# Patient Record
Sex: FEMALE | Race: ASIAN | NOT HISPANIC OR LATINO | Employment: FULL TIME
[De-identification: names, ages, dates, MRNs, and addresses within clinical notes are randomized per-mention and may not be internally consistent; named-entity substitution may affect disease eponyms.]

---

## 2017-09-25 ENCOUNTER — HOSPITAL ENCOUNTER (OUTPATIENT)
Dept: ADMINISTRATIVE | Age: 27
Discharge: HOME | End: 2017-09-25

## 2017-09-28 ENCOUNTER — HOSPITAL ENCOUNTER (OUTPATIENT)
Dept: OBGYN | Facility: HOSPITAL | Age: 27
Discharge: HOME HEALTH | End: 2017-09-28
Attending: OBSTETRICS & GYNECOLOGY | Admitting: OBSTETRICS & GYNECOLOGY

## 2017-11-07 ENCOUNTER — RECORDS - HEALTHEAST (OUTPATIENT)
Dept: ADMINISTRATIVE | Facility: OTHER | Age: 27
End: 2017-11-07

## 2017-11-07 ENCOUNTER — ANESTHESIA - HEALTHEAST (OUTPATIENT)
Dept: OBGYN | Facility: HOSPITAL | Age: 27
End: 2017-11-07

## 2017-11-07 ENCOUNTER — SURGERY - HEALTHEAST (OUTPATIENT)
Dept: OBGYN | Facility: HOSPITAL | Age: 27
End: 2017-11-07

## 2017-11-07 ASSESSMENT — MIFFLIN-ST. JEOR: SCORE: 1276.54

## 2017-11-10 ENCOUNTER — HOME CARE/HOSPICE - HEALTHEAST (OUTPATIENT)
Dept: HOME HEALTH SERVICES | Facility: HOME HEALTH | Age: 27
End: 2017-11-10

## 2017-11-12 ENCOUNTER — HOME CARE/HOSPICE - HEALTHEAST (OUTPATIENT)
Dept: HOME HEALTH SERVICES | Facility: HOME HEALTH | Age: 27
End: 2017-11-12

## 2018-11-08 ENCOUNTER — OFFICE VISIT - HEALTHEAST (OUTPATIENT)
Dept: FAMILY MEDICINE | Facility: CLINIC | Age: 28
End: 2018-11-08

## 2018-11-08 ENCOUNTER — COMMUNICATION - HEALTHEAST (OUTPATIENT)
Dept: SCHEDULING | Facility: CLINIC | Age: 28
End: 2018-11-08

## 2018-11-08 ENCOUNTER — HOSPITAL ENCOUNTER (OUTPATIENT)
Dept: ULTRASOUND IMAGING | Facility: HOSPITAL | Age: 28
Discharge: HOME | End: 2018-11-08
Attending: FAMILY MEDICINE

## 2018-11-08 DIAGNOSIS — O20.9 BLEEDING IN EARLY PREGNANCY: ICD-10-CM

## 2018-11-08 DIAGNOSIS — Z32.00 POSSIBLE PREGNANCY: ICD-10-CM

## 2018-11-08 LAB
ERYTHROCYTE [DISTWIDTH] IN BLOOD BY AUTOMATED COUNT: 13 % (ref 11–14.5)
HCG SERPL-ACNC: 8493 MLU/ML (ref 0–4)
HCG UR QL: POSITIVE
HCT VFR BLD AUTO: 41.6 % (ref 35–47)
HGB BLD-MCNC: 13.3 G/DL (ref 12–16)
INR PPP: 1.02 (ref 0.9–1.1)
MCH RBC QN AUTO: 26 PG (ref 27–34)
MCHC RBC AUTO-ENTMCNC: 32 G/DL (ref 32–36)
MCV RBC AUTO: 81 FL (ref 80–100)
PLATELET # BLD AUTO: 227 THOU/UL (ref 140–440)
PMV BLD AUTO: 9.2 FL (ref 7–10)
RBC # BLD AUTO: 5.13 MILL/UL (ref 3.8–5.4)
WBC: 4.6 THOU/UL (ref 4–11)

## 2018-11-09 ENCOUNTER — COMMUNICATION - HEALTHEAST (OUTPATIENT)
Dept: FAMILY MEDICINE | Facility: CLINIC | Age: 28
End: 2018-11-09

## 2019-09-24 ENCOUNTER — TELEPHONE (OUTPATIENT)
Dept: FAMILY MEDICINE | Facility: CLINIC | Age: 29
End: 2019-09-24

## 2019-09-24 NOTE — TELEPHONE ENCOUNTER
[facility] phone call message- general phone call:    Reason for call: Patient states she is interested in scheduling an appointment for ob. She states she had her pregnancy confirmed at [facility] and they confirmed pregnancy via ultrasound. She states her expected due date is 03/11/2020. Notified patient, a nurse will return her call. Please call and advise.    Return call needed: Yes    OK to leave a message on voice mail? Yes    Primary language: English      needed? No    Call taken on September 24, 2019 at 8:32 AM by Delmy Landers

## 2019-09-25 NOTE — TELEPHONE ENCOUNTER
OB intake completed over the phone with patient today, 2019. Yo is a 28 yo, Hmong, English speaking,  with history of one miscarriage 2018 at about 10 weeks gestation. Uneventful, full term,  previous pregnancies- first pregnancy was ordoñez, second pregnancy- twin gestation. Ordoñez pregnancy was post dates, induced, NVSD. Second pregnancy delivered via  at 38 weeks gestation. Yo would like to attempt vaginal birth if able after TOLAC consultation with OB/Gyn for this pregnancy.      Delayed prenatal care due to inconvenience,lack of time, Yo works second shift, full time. Her , Bhavani Nguyen, 30 yo, Hmong male (culturally ) is a full time college student, limited . What prompted her to contact [facility] to establish prenatal care was a follow up call made by [facility] on [address]. Yo was seen at [facility] 2 months ago, had pregnancy confirmation and a basic ultrasound done. LMP reported as 2019. JUDY based on early ultrasound is 3/11/2020 which puts her at 16 weeks today. Has noticed slight quickening sensation.     Yo is Hepatitis B positive, per Yo this has been known since she was a child, born in [place]. Has not seen or established with GI/Hepatology regarding this. Brief discussion regarding the expectation of OB/Gyn and GI consultation during course of pregnancy. No known or identified family history of genetic disorders. No concerns voiced during intake. With a friend's recommendation, would like to request to see Dr Ojeda for prenatal care. Discussed with Dr Ojeda, who will be available to follow patient. NOB is scheduled for 2019. Mick RN    Average Risk Category  No significant risk factors: No    At Risk Category (up to 3)  Teen pregnancy: No  Poor social situation: No  Domestic abuse: No  Financial difficulties: No  Smoker: No  H/O  deliver: No  H/O drug abuse: No  Non-English speaking: No  Advanced  maternal age: No  GDM risks: No  Previous C/S: Yes  H/O PIH: No  H/O STIs: No  H/O mental health concerns: No  Onset care > 20 weeks: No  Other: NONE    High Risk Category (4 or more At Risk or)  Diabetes/GDM: No  Multiple gestation: No  Chronic hypertension: No  Significant hx of asthma: No  Fetal demise > 20 weeks: No  Positive tox screen: No  Current mental health treatment: No  Other: NONE    Risk: Average Risk   Date determined: 9/25/2019  Mick HERNANDEZ

## 2019-09-29 NOTE — PROGRESS NOTES
First Obstetric Visit       HPI       29 year old  (twins 2nd pregnancy delivered via c/s) with history of chronic hepatitis B at 16w5d via LMP (consistent with 11 wk US) with JUDY of 3/11/2020 presenting for first OB visit, desires TOLAC.  She has not had bleeding since her LMP. Nausea is gone. Weight loss has not occurred.  This was a planned pregnancy.     Prepregnancy weight: 119 lbs    OTHER CONCERNS: none     Labor Risk Assessment   Is the patient's age <18 or >40?    No  Patint's BMI is Body mass index is 24.64 kg/m . Does patient have a BMI < 18.5?    No  If previous pregnancy, was delivery within previous 6 months?    No  Have you ever delivered a baby prior to 37 weeks gestation?    No  Did conception for this pregnancy occur via In Vitro Fertilization?    No  Are you carrying twins?    No    Summary:  Patient is Average/high risk for  Labor (verify Problem List includes V23.9 and note risk factor(s) in the Comments)  High Risk pregnancy  Past Medical History  No past medical history on file.  Do you have a history of any of the following medical conditions?    Condition Yes/No   Hypertension No   Heart disease, mitral valve prolapse, rheumatic fever No   Asthma or another chronic lung disease No   An autoimmune disorder No   Kidney disease No   Frequent urinary tract infections No   Migraine headaches No   Stroke, loss of sensation/function, seizures, or other neuro problem No   Diabetes No   Thyroid problems or have you taken thyroid medication No   Hepatitis, liver disease, jaundice No   Blood clots, phlebitis, pulmonary embolism or varicose veins No   Excessive bleeding after surgery or dental work No   Heavy menstrual periods requiring treatment No   Anemia No   Blood transfusions No        Would you refuse a blood transfusion? No   Breast problems No   Abnormalities of the uterus No   Abnormal pap smear No   Have you been treated for an emotional disturbance? No   Have you been  physically, sexually, or emotionally hurt by someone? No   Have you been in a major accident or suffered serious trauma? No   Have you had surgical procedures? No        Have you ever had any complications from anesthesia? No   Have you ever been hospitalized for a nonsurgical reason? No     Prenatal Genetic Screening    Do you have a history of any of the following Yes/No        A metabolic disorder (e.g. Insulin-dependent DM, PKU) No        Recurrent pregnancy loss No     Do you, the baby's father, or anyone in your families have Yes/No        Thalassemia AND MCV <80 No        Hemophilia No        Neural tube defect No        Congenital heart defect No        Sickle cell disease or trait No        Muscular dystrophy No        Cystic fibrosis No        Mental retardation or autism No        Down's syndrome No        Javed-Sach's disease No        Mi[place]'s chorea No        Any other inherited genetic or chromosomal disorder No        A child with birth defects not listed above No     Infection History    At high risk for coming in contact with HIV No   Ever treated for tuberculosis No   Ever received the BCG vaccine for tuberculosis No   Ever had genital herpes (or has your partner) No   Had a rash or viral illness since LMP No   Ever had a sexually transmitted infection No   Ever had chicken pox or the vaccine Yes   Ever had any other serious infectious disease No   Up to date with immunizations Yes       Habits  Non-smoker, No ETOH and Regular exercise.  Current medications are:  Current Outpatient Medications   Medication Sig Dispense Refill     Prenatal Vit-Fe Fumarate-FA (PRENATAL MULTIVITAMIN W/IRON) 27-0.8 MG tablet Take 1 tablet by mouth daily 90 tablet 3       REVIEW OF SYSTEMS  ENT: NEGATIVE for ear, mouth and throat problems  CV: NEGATIVE for chest pain, palpitations or peripheral edema  GI: NEGATIVE for nausea, abdominal pain, heartburn, or change in bowel habits  : NEGATIVE for unusual urinary,  "+vaginal discharge  C: NEGATIVE for fever, chills, change in weight  I: NEGATIVE for worrisome rashes, moles or lesions  E: NEGATIVE for vision changes or irritation  R: NEGATIVE for significant cough or SOB  B: NEGATIVE for masses, tenderness or discharge  M: NEGATIVE for significant arthralgias or myalgia  N: NEGATIVE for weakness, dizziness or paresthesias  P: NEGATIVE for changes in mood or affect  ====================================================    PHYSICAL EXAM:  BP 91/59   Pulse 74   Temp 98.2  F (36.8  C)   Resp 22   Ht 1.499 m (4' 11\")   Wt 55.3 kg (122 lb)   LMP 2019   SpO2 99%   BMI 24.64 kg/m         GENERAL:  Pleasant pregnant female, alert, well groomed.  SKIN:  Warm and dry, without lesions or rashes  HEAD: Symmetrical features.  EYES:  PERRL, EOMI.  MOUTH:  Buccal mucosa pink, moist without lesions.    NECK:  Thyroid without enlargement and nodules.  Lymph nodes not palpable.  LUNGS:  Clear to auscultation.  HEART:  RRR without murmur.  ABDOMEN: Soft without masses, tenderness or organomegaly.  Uterus at umbilicus FHT 140s  MUSCULOSKELETAL:  Full range of motion  EXTREMITIES:  No edema. No significant varicosities.  VAGINA:  Pink, normal rugae and discharge, thin offwhite vaginal discharge in vault  CERVIX:  smooth, without discharge or CMT and parous os,   closed   =========================================    ASSESSMENT/PLAN  1. High-risk pregnancy in second trimester  29 year old  (twins 2nd pregnancy delivered via c/s) with history of hepatitis B at 16w5d via LMP (consistent with 11 wk US) with JUDY of 3/11/2020 presenting for first OB visit, desires TOLAC. Difficult to find FHRs on doppler, but was able to visualize cardiac activity with bedside US. Uterus size seems larger than 16 week gestation. Will plan for fetal survey at follow up visit in 4 weeks and reassess size at that time.     High Risk pregnancy given chronic Hepatitis B    - ABO/Rh Type-HML ([facility])  - " Antibody Screen ([facility])  - Chlamydia/Gono Amplified ([facility])  - CBC with Plt ([facility])  - Culture Urine ([facility])  - HIV Ag/Ab Screen [place] ([facility])  - Rubella  IgG ([facility])  - Syphilis Screen [place] ([facility])  - Urinalysis([facility])  - GYN Cytology ([facility])    Recommended weight gain: 15-25 lbs.  Instructed on best evidence for: weight gain for her BMI for pregnancy; healthy diet and foods to avoid; exercise and activity during pregnancy;  avoiding exposure to toxoplasmosis; and maintenance of a generally healthy lifestyle.   Discussed the harms, benefits, side effects and alternative therapies for current prescribed and OTC medications.    2. Viral hepatitis B chronic (H)  History of chronic Hep B, diagnosed with childhood. Patient reports never seeing GI or being treated. Will obtain Hep B labs and if remains active, refer to GI.  - Hepatitis Be Agn ([facility])  - Hepatitis Be Cherelle ([facility])  - Hepatitis B DNA Detect and Quant ([facility])  - Hepatitis B Surface Ag ([facility])  - Hepatitis B Surface Ab ([facility])  - Hepatic Profile ([facility])    3. Vaginal discharge  Likely physiology vaginal discharge, wet prep negative.   - Wet Prep ([facility])    Options for treatment and follow-up care were reviewed with the patient and/or guardian. Yo Aragon and/or guardian engaged in the decision making process and verbalized understanding of the options discussed and agreed with the final plan.    Gabrielle Ojeda DO

## 2019-09-30 ENCOUNTER — OFFICE VISIT (OUTPATIENT)
Dept: FAMILY MEDICINE | Facility: CLINIC | Age: 29
End: 2019-09-30
Payer: COMMERCIAL

## 2019-09-30 VITALS
HEIGHT: 59 IN | SYSTOLIC BLOOD PRESSURE: 91 MMHG | DIASTOLIC BLOOD PRESSURE: 59 MMHG | TEMPERATURE: 98.2 F | OXYGEN SATURATION: 99 % | HEART RATE: 74 BPM | BODY MASS INDEX: 24.6 KG/M2 | RESPIRATION RATE: 22 BRPM | WEIGHT: 122 LBS

## 2019-09-30 DIAGNOSIS — B18.1 VIRAL HEPATITIS B CHRONIC (H): ICD-10-CM

## 2019-09-30 DIAGNOSIS — N89.8 VAGINAL DISCHARGE: ICD-10-CM

## 2019-09-30 DIAGNOSIS — O09.92 HIGH-RISK PREGNANCY IN SECOND TRIMESTER: Primary | ICD-10-CM

## 2019-09-30 LAB
ALBUMIN SERPL BCP-MCNC: 3 G/DL (ref 3.5–5)
ALP SERPL-CCNC: 67 U/L (ref 45–120)
ALT SERPL W/O P-5'-P-CCNC: 22 U/L (ref 0–45)
AST SERPL-CCNC: 22 U/L (ref 0–40)
BACTERIA: NORMAL
BILIRUB DIRECT SERPL-MCNC: <0.1 MG/DL (ref 0–0.5)
BILIRUB SERPL-MCNC: 0.2 MG/DL (ref 0–1)
BILIRUBIN UR: NEGATIVE
BLOOD UR: NEGATIVE
CLUE CELLS: NORMAL
GLUCOSE URINE: NEGATIVE
HCT VFR BLD AUTO: 36 % (ref 35–47)
HEMOGLOBIN: 10.9 G/DL (ref 11.7–15.7)
HIV 1+2 AB+HIV1 P24 AG SERPL QL IA: NEGATIVE
KETONES UR QL: NEGATIVE
LEUKOCYTE ESTERASE UR: ABNORMAL
MCH RBC QN AUTO: 25.8 PG (ref 26.5–35)
MCHC RBC AUTO-ENTMCNC: 30.3 G/DL (ref 32–36)
MCV RBC AUTO: 85.3 FL (ref 78–100)
MOTILE TRICHOMONAS: NEGATIVE
NITRITE UR QL STRIP: NEGATIVE
ODOR: NORMAL
PH UR STRIP: 7.5 [PH] (ref 5–7)
PH WET PREP: NORMAL (ref 3.8–4.5)
PLATELET # BLD AUTO: 208 K/UL (ref 150–450)
PROT SERPL-MCNC: 6.5 G/DL (ref 6–8)
PROTEIN UR: NEGATIVE
RBC # BLD AUTO: 4.22 M/UL (ref 3.8–5.2)
SP GR UR STRIP: 1.01
UROBILINOGEN UR STRIP-ACNC: ABNORMAL
WBC # BLD AUTO: 6.3 K/UL (ref 4–11)
WBC WET PREP: NORMAL (ref 2–5)
YEAST: NORMAL

## 2019-09-30 RX ORDER — PRENATAL VIT/IRON FUM/FOLIC AC 27MG-0.8MG
1 TABLET ORAL DAILY
Qty: 90 TABLET | Refills: 3 | COMMUNITY
Start: 2019-09-30 | End: 2019-11-01

## 2019-09-30 ASSESSMENT — MIFFLIN-ST. JEOR: SCORE: 1189.02

## 2019-09-30 NOTE — PATIENT INSTRUCTIONS
[facility] Information  If you have any further concerns or wish to schedule another appointment, please call our office at [phone] during normal business hours (8-5, M-F).     For uncomplicated pregnancies, you will be seeing your doctor once a month until you are 28 weeks, then you will see your doctor twice a month. You will begin weekly visits at 36 weeks until you deliver.     If you have urgent medical questions that cannot wait, you may call [phone] at any time of day. Call your doctor if you experience any bleeding or abdominal cramping early in pregnancy.     If you have a medical emergency, please call [phone].    When to call or come in to the hospital  If you have any bleeding or leakage of fluids.   If you have uterine cramping that does not resolve with rest and fluids.  If you have a headache not resolved with tylenol, right upper abdominal pain, or sudden onset of swelling.  You know your body best. Never hesitate to call or go to the hospital if something doesn't feel right!  Genetic Screening  Sampling of your blood to screen for genetic abnormalities, like Down's syndrome, in your baby  Done at 16-18 weeks gestation  Screening test only - further testing, like advanced ultrasound or amniocentesis, would be needed to confirm positive test!  Recommended for mothers over age 35, history of genetic abnormalities,   Talk to your doctor if you have further questions, or are interested in this screening test.   Breastfeeding  Breast feeding is best, for you and baby!   Recommendations are for exclusive breastfeeding for babies first 6 months of life.  Talk to your doctor if you have more questions about breastfeeding your baby.  Other Pregnancy Concerns  Continue to take a prenatal vitamin daily  Maintain an active, healthy lifestyle.   If this is your first baby, you can expect to start feeling movement at 20-24 weeks gestation. If this is your second or more pregnancy, you will  generally start feeling movement at 18-22 weeks gestation.     Fetal survey US scheduled as follow:  [facility],Thursday are inconvenient, request to be done on Fridays at Hospital.  Friday, 10/25/2019 @ 1:00pm. Patient informed to arrive by 12:45pm with full bladder prep. Drink 24 oz of fluid 1 hour prior to scan. Order has been faxed to [facility] scheduling [phone]. Mick HERNANDEZ    Spoke with Yo this morning, she will come in this Friday, 10/4/2019 after 3pm to complete [facility] to obtain early basic ultrasound that was completed at 8 weeks gestation per Yo's verbal report.  Mick HERNANDEZ

## 2019-09-30 NOTE — LETTER
October 1, 2019      Yo Aragon  [address] [place] [address]        Dear Yo,    Your vaginal swab shows you do not have an infection, this is likely normal discharge of pregnancy. Your hemoglobin or blood level is slightly low, we will monitor this again later in pregnancy. Your urine shows no infection.     Please see below for your test results.    Resulted Orders   CBC with Plt ([facility])   Result Value Ref Range    WBC 6.3 4.0 - 11.0 K/uL    RBC 4.22 3.80 - 5.20 M/uL    Hemoglobin 10.9 (L) 11.7 - 15.7 g/dL    Hematocrit 36.0 35.0 - 47.0 %    MCV 85.3 78.0 - 100.0 fL    MCH 25.8 (L) 26.5 - 35.0 pg    MCHC 30.3 (L) 32.0 - 36.0 g/dL    Platelets 208.0 150.0 - 450.0 K/uL   Urinalysis([facility])   Result Value Ref Range    Specific Gravity Urine 1.015     pH Urine 7.5     Leukocyte Esterase UR Trace (A) NEGATIVE    Nitrite Urine Negative NEGATIVE    Protein UR Negative NEGATIVE    Glucose Urine Negative NEGATIVE    Ketones Urine Negative NEGATIVE    Urobilinogen mg/dL 0.2 E.U./dL 0.2 E.U./dL    Bilirubin UR Negative NEGATIVE    Blood UR Negative NEGATIVE   Wet Prep ([facility])   Result Value Ref Range    Yeast Wet Prep None none    Motile Trichomonas Wet Prep Negative Negative    Clue Cells Wet Prep None NONE    WBC WET PREP None 2 - 5    Bacteria Wet Prep Few None    pH Wet Prep Not performed 3.8 - 4.5    Odor Wet Prep None NONE       If you have any questions, please call the clinic to make an appointment.    Sincerely,    Gabrielle Ojeda, DO

## 2019-10-01 ENCOUNTER — RECORDS - HEALTHEAST (OUTPATIENT)
Dept: ADMINISTRATIVE | Facility: OTHER | Age: 29
End: 2019-10-01

## 2019-10-01 LAB
ABO + RH BLD: NORMAL
BLD GP AB SCN SERPL QL: NEGATIVE
C TRACH RRNA CVX QL NAA+PROBE: NEGATIVE
CULTURE: NORMAL
HBV SURFACE AB SER-ACNC: NEGATIVE M[IU]/ML
HBV SURFACE AG SERPL QL IA: ABNORMAL
N GONORRHOEA RRNA SPEC QL NAA+PROBE: NEGATIVE
REPEAT ABO/RH TYPING (HML): NORMAL
RUBV IGG SERPL QL IA: POSITIVE
TREPONEMA ANTIBODY (SYPHILIS): NEGATIVE

## 2019-10-02 ENCOUNTER — RECORDS - HEALTHEAST (OUTPATIENT)
Dept: ADMINISTRATIVE | Facility: OTHER | Age: 29
End: 2019-10-02

## 2019-10-02 LAB
CYTOLOGY CVX/VAG DOC THIN PREP: NORMAL
HEPATITIS BE ABY: NEGATIVE
HEPATITIS BE AGN: POSITIVE
HIGH RISK?: NO
HPV REFLEX?: NORMAL
LAB AP ABNORMAL BLEEDING: NO
LAB AP BIRTH CONTROL/HORMONES: NORMAL
LAB AP CERVICAL APPEARANCE: NORMAL
LAB AP PATIENT STATUS: NORMAL
LAB AP PREVIOUS ABNL: NO
LAB AP PREVIOUS NORMAL: NORMAL
LAST MENS PERIOD: NORMAL
PATH REPORT.COMMENTS IMP SPEC: NORMAL
PATH REPORT.COMMENTS IMP SPEC: NORMAL
SPECIMEN ADEQUACY:: NORMAL

## 2019-10-02 NOTE — PROGRESS NOTES
Preceptor Attestation:   Patient seen, evaluated and discussed with the resident. I have verified the content of the note, which accurately reflects my assessment of the patient and the plan of care.    Supervising Physician:Ramiro Arndt MD    [facility]

## 2019-10-04 LAB
HEP B VIRUS DNA QUANT IU/ML: ABNORMAL [IU]/ML
HEP B VIRUS DNA QUANT LOG IU/ML: >8.2 {LOG_IU}/ML

## 2019-10-07 DIAGNOSIS — B18.1 VIRAL HEPATITIS B CHRONIC (H): Primary | ICD-10-CM

## 2019-10-07 DIAGNOSIS — O09.92 HIGH-RISK PREGNANCY IN SECOND TRIMESTER: ICD-10-CM

## 2019-10-07 NOTE — PATIENT INSTRUCTIONS
Referral for :     Gastroenterology    LOCATION/PLACE/Provider :    [facility]   DATE & TIME :    Location will contact patient for scheduling   PHONE :     [phone]  FAX :    [phone]  Appointment made by clinic staff/:    Kathy

## 2019-10-07 NOTE — PROGRESS NOTES
Called patient and relayed results of recent blood work regarding her chronic hepatitis B. Discussed importance of GI consult to ensure safety for patient and baby.     10/7/2019 08:07   Ref. Range 9/30/2019 16:28   Hepatitis B Surface Antigen Latest Ref Range: Negative  Positive, Confirm (A)   Hepatitis B Surface Antibody Latest Ref Range: Negative  Negative   Hep B Virus DNA Quant IU/mL Latest Ref Range: HBVND [IU]/mL >078001377 (A)   Hep B Virus DNA Quant Log IU/mL Latest Ref Range: <1.3 Log_IU/mL >8.2 (H)   HEPATITIS BE ANTIGEN (HBEAG) ([facility]) Unknown Rpt (A)   HEPATITIS BE ANTIBODY (HBEAB) ([facility]) Unknown Rpt   Hepatitis Be Cherelle Latest Ref Range: Negative  Negative   Hepatitis Be Agn Latest Ref Range: Negative  Positive (A)      Ref. Range 9/30/2019 17:04   Albumin Latest Ref Range: 3.5 - 5.0 g/dL 3.0 (L)   Protein Total Latest Ref Range: 6.0 - 8.0 g/dL 6.5   Bilirubin Total Latest Ref Range: 0.0 - 1.0 mg/dL 0.2   Alkaline Phosphatase Latest Ref Range: 45 - 120 U/L 67   ALT (SGPT) Latest Ref Range: 0 - 45 U/L 22   AST (SGOT) Latest Ref Range: 0 - 40 U/L 22   Bilirubin Direct Latest Ref Range: <=0.5 mg/dL <0.1      Ref. Range 9/30/2019 16:28   Platelets Latest Ref Range: 150.0 - 450.0 K/uL 208.0       Gabrielle Ojeda DO  [facility] Resident  Pager #[phone]

## 2019-10-11 ENCOUNTER — TRANSFERRED RECORDS (OUTPATIENT)
Dept: HEALTH INFORMATION MANAGEMENT | Facility: CLINIC | Age: 29
End: 2019-10-11

## 2019-10-11 NOTE — RESULT ENCOUNTER NOTE
Referral for :     Gastroenterology    LOCATION/PLACE/Provider :    [facility]   DATE & TIME :    Location will contact patient for scheduling   PHONE :     [phone]  FAX :    [phone]  Appointment made by clinic staff/:    Kathy    Called patient and relayed results of recent blood work regarding her chronic hepatitis B. Discussed importance of GI consult to ensure safety for patient and baby.

## 2019-10-21 ENCOUNTER — RECORDS - HEALTHEAST (OUTPATIENT)
Dept: ADMINISTRATIVE | Facility: OTHER | Age: 29
End: 2019-10-21

## 2019-10-22 ENCOUNTER — DOCUMENTATION ONLY (OUTPATIENT)
Dept: FAMILY MEDICINE | Facility: CLINIC | Age: 29
End: 2019-10-22

## 2019-10-22 NOTE — PROGRESS NOTES
Referred to GI for chronic Hep B and high DNA quant (>170,000,000). They recommended an abdominal US with doppler, results pending. Plan to most likely initiated treatment of Hep B at the start of the 3rd trimester to prevent transmission to baby. She is supposed to follow up with [facility] around Nov. 22, 2019.    Gabrielle Ojeda, DO

## 2019-10-25 ENCOUNTER — HOSPITAL ENCOUNTER (OUTPATIENT)
Dept: ULTRASOUND IMAGING | Facility: HOSPITAL | Age: 29
Discharge: HOME | End: 2019-10-25
Attending: FAMILY MEDICINE

## 2019-10-25 DIAGNOSIS — B19.10 HEPATITIS B: ICD-10-CM

## 2019-10-25 DIAGNOSIS — O09.92 HIGH-RISK PREGNANCY, SECOND TRIMESTER: ICD-10-CM

## 2019-10-30 ENCOUNTER — TRANSFERRED RECORDS (OUTPATIENT)
Dept: HEALTH INFORMATION MANAGEMENT | Facility: CLINIC | Age: 29
End: 2019-10-30

## 2019-11-01 ENCOUNTER — OFFICE VISIT (OUTPATIENT)
Dept: FAMILY MEDICINE | Facility: CLINIC | Age: 29
End: 2019-11-01
Payer: COMMERCIAL

## 2019-11-01 VITALS
SYSTOLIC BLOOD PRESSURE: 97 MMHG | BODY MASS INDEX: 24.07 KG/M2 | RESPIRATION RATE: 16 BRPM | OXYGEN SATURATION: 100 % | HEIGHT: 60 IN | HEART RATE: 75 BPM | DIASTOLIC BLOOD PRESSURE: 63 MMHG | WEIGHT: 122.6 LBS | TEMPERATURE: 98.1 F

## 2019-11-01 DIAGNOSIS — Z23 NEED FOR PROPHYLACTIC VACCINATION AND INOCULATION AGAINST INFLUENZA: ICD-10-CM

## 2019-11-01 DIAGNOSIS — O09.92 HIGH-RISK PREGNANCY IN SECOND TRIMESTER: Primary | ICD-10-CM

## 2019-11-01 DIAGNOSIS — B18.1 VIRAL HEPATITIS B CHRONIC (H): ICD-10-CM

## 2019-11-01 DIAGNOSIS — Z71.84 TRAVEL ADVICE ENCOUNTER: ICD-10-CM

## 2019-11-01 DIAGNOSIS — O35.DXX0 ECHOGENIC FOCUS OF BOWEL OF FETUS AFFECTING ANTEPARTUM CARE OF MOTHER, SINGLE OR UNSPECIFIED FETUS: ICD-10-CM

## 2019-11-01 RX ORDER — PRENATAL VIT/IRON FUM/FOLIC AC 27MG-0.8MG
1 TABLET ORAL DAILY
Qty: 90 TABLET | Refills: 1 | Status: SHIPPED | OUTPATIENT
Start: 2019-11-01 | End: 2020-09-24

## 2019-11-01 SDOH — HEALTH STABILITY: MENTAL HEALTH: HOW OFTEN DO YOU HAVE A DRINK CONTAINING ALCOHOL?: NEVER

## 2019-11-01 ASSESSMENT — MIFFLIN-ST. JEOR: SCORE: 1205.12

## 2019-11-01 NOTE — PROGRESS NOTES
"SUBJECTIVE  No vaginal bleeding, loss of fluids, or cramping noted. No headaches, change in vision, RUQ pain, or lower extremity edema. Patient has felt baby move. She has been able to tolerate a diet with no significant nausea/vomitting. She has been taking her pre-fransico vitamin, needs refill.     Going to [facility] dec 11- which was booked prior to discovering she was pregnant. Wondering if it is safe for her to go.    Continues to follow with [facility] for her chronic hep B. Has a follow up visit with them today.     OBJECTIVE  BP 97/63   Pulse 75   Temp 98.1  F (36.7  C) (Oral)   Resp 16   Ht 1.52 m (4' 11.84\")   Wt 55.6 kg (122 lb 9.6 oz)   LMP 2019   SpO2 100%   BMI 24.07 kg/m      General: sitting up in a chair awake and alert. Pleasant and cooperative in NAD.  CV: acyanotic   Pulm: breathing comfortably, no increased WOB  Abd: gravid, non-tender, uterus palpated 23, FHTs 135  Extremities: soft, nontender    10/25/19 US  CONCLUSION:    1.  Single living intrauterine gestation.  2.  Based on this ultrasound, composite age of 20 weeks 6 days with EDC 3/7/2020.  3.  Normal interval growth.  4.  An echogenic focus in the left ventricle is noted. This is nonspecific and is usually clinically insignificant. Rarely this could be associated with aneuploidy. Consider laboratory assessment for aneuploidy. Otherwise normal fetal survey.     ASSESSMENT/PLAN    1. High-risk pregnancy in second trimester  Patient is a  a 29 year old  (twins 2nd pregnancy, delivered via c/s) female at 21w2d corresponding to JUDY of  Mar 11, 2020 obtained via LMP (consistent with 11 wk US), desires TOLAC.  - Prenatal labs unremarkable (Blood type:O+, Hgb:10.9, Syphilis, GC/Chlam, HIV negative, Rubella immune)  - Pregravid weight 119 lbs, TWG today 3 lbs  - Next visit: 4 weeks    2. Viral hepatitis B chronic (H)  Chronic hepatitis B with high viral load. Following with [facility], with plan to likely intiated hep B treatment " at beginning of 3rd trimester to prevent vertical transmission. Has a follow up visit with them later today.     3. Echogenic focus of bowel of fetus affecting antepartum care of mother, single or unspecified fetus  Echogenic focus seen in left ventricle on anatomy US scan. Discussed results with patient and she elects to undergo genetic screening. Since less than 22 weeks, will perform quad screen today.   - AFP 4-Marker Scn ([facility])    4. Travel advice encounter  Previously scheduled international trip to [facility] for 6 weeks prior to pregnancy. Given patient will be in her 3rd trimester and will be likely be undergoing treatment for chronic Hepatitis B, recommended against traveling to [facility]. Patient agreed with advice. A medical opinion letter was given to supply to the airline.     5. Need for prophylactic vaccination and inoculation against influenza  - Fluzone quad, preserve-free/prefilled  0.5ml, 6+ months [17471]    -----------------------------------------    Precepted with: MD Gabrielle Coleman DO  [facility] Medicine Resident  Pager #[phone]      Preceptor Attestation:  I saw and evaluated the patient.  I reviewed the resident physician's history, exam, and treatment plan; and I agree with the documentation by the resident physician.  Supervising Physician:  Zain Morales MD

## 2019-11-01 NOTE — LETTER
November 6, 2019      Yo Aragon  [address] [place] [address]        Dear Yo,  Your genetic screening was negative for abnormalities.     Please see below for your test results.    Resulted Orders   AFP 4-Marker Scn ([facility])   Result Value Ref Range    Patient's AFP 66 ng/mL    Mom for AFP 0.83     Patient's UE3 3.36 ng/mL    Mom for UE3 1.10     Patient's HCG, 2nd Trimester 60638 IU/L    HCG Mom, 2nd Trimester 1.33     Patient's Katarzyna 201 pg/mL    Mom for Katarzyna 0.91     Maternal Screen Interpretation Screen Neg       Comment:      INTERPRETATION: SCREEN NEGATIVE                                Neural Tube Defects (NTD)   Negative       Down syndrome (DS)          Negative       Trisomy 18 (T18)            Negative                                                                 Pre-Test     Post-Test    Cutoff                                       Neural Tube Defects Risks   1:1030       < 1:55958    1:250          Down Syndrome Risks         1:855        1:3620       1:150          Trisomy 18 Risks            1:3330       < 1:08449    1:100                                        Comments:                                                   The risk of an open neural tube defect is less than the  screening cut-off.                                          The risk of Down syndrome is less than the screening  cut-off.                                                    The risk of trisomy 18 is less than the screening cut-off.  Test developed and characteristics determined by [facility]. See Compliance Statem  ent B: [email or web address]      Maternal [age] Delivery 28.8 yr    Maternal Weight 122.0 lbs.     Estimated Due Date 03-11-20     Gestational Age Calculated at Collection 21 wks, 2 days     Dating Other     Number of Fetuses Ordoñez     Maternal Race Nonblack     Insulin Req Maternal Diabetes No     Smoking No     Family Hx Neural Tube Defect No     Family History of Aneuploidy No     Specimen See  Note       Comment:      Initial sample    EER Maternal Serun, Quad See Note       Comment:      Access [employer] Enhanced Report using either link below:     -Direct access: [email or web address]     -Enter Username, Password: [email or web address]   Username: [email or web address]   Password: [email or web address]  Performed by [facility],  [address] [place] [id number] [phone]  [email or web address], Dmitry Guy MD, Lab. Director      Narrative    Test performed by:  [facility]  [address] [place] [phone]       If you have any questions, please call the clinic to make an appointment.    Sincerely,    Gabrielle Ojeda, DO

## 2019-11-01 NOTE — LETTER
2019      Yo Aragon  [address] [place] [address]      Due to medical reasons, Yo Aragon ( 89) was advised against international travel. If there is any question or concern please feel free to contact me at the number provided above.       Sincerely,          Gabrielle Ojeda DO

## 2019-11-01 NOTE — PATIENT INSTRUCTIONS
[facility] Information  If you have any further concerns or wish to schedule another appointment, please call our office at [phone] during normal business hours (8-5, M-F).     For uncomplicated pregnancies, you will be seeing your doctor once a month until you are 28 weeks, then you will see your doctor twice a month. You will begin weekly visits at 36 weeks until you deliver.     If you have urgent medical questions that cannot wait, you may call [phone] at any time of day.     If you have a medical emergency, please call [phone].    When to call or come in to the hospital  If you notice a decrease in your baby's movement.   If your begin to experience contractions that are 5 minutes or less apart and lasting for over 45 seconds, or if contractions are becoming more painful.  If you have any bleeding or leakage of fluids.   If you have a headache not resolved with tylenol, right upper abdominal pain, or sudden onset of swelling.  You know your body best. Never hesitate to call or go to the hospital if something doesn't feel right!  Gestational Diabetes Screen  At your next visit, you will be screened for gestational diabetes. You will drink a sugary drink and then have your blood drawn to see how your body responds to a sugar load. This test takes about an hour to complete - please plan your schedule accordingly!  The Benefits of Breastfeeding   Breastfeeding gives your new baby the very best start. It supplies nutrition, comfort, and love. Experts agree: Breastfeeding is the healthiest choice for babies during the first year of life and beyond. It s healthy for Mom, too. Breastfeeding may be challenging at first. But with support, you and your baby can succeed together.   Healthiest for Baby   Breastmilk is the ideal food for babies. It has all the nutrients your little one needs to grow healthy and strong. Here are some of the many benefits for your baby:   Breastfeeding provides contact that  babies love. Frequent skin-to-skin time with Mom is calming and comforting.   Breastmilk is full of antibodies. These are substances that help your baby fight infection. Breastmilk reduces your baby's risk of respiratory illnesses, ear infections, and diarrhea.   Breastfeeding reduces your baby s risk of allergies, colds, and many other diseases.   Breastmilk changes as your baby grows, meeting her changing needs.   Breastmilk is easy for your baby to digest.   Breastmilk contains DHA, a fat that is good for your baby s developing brain and eyes.   Breastmilk decreases the risk of sudden infant death syndrome (SIDS).  Healthiest for Mom   For many women, breastfeeding is an amazing experience. It creates a strong bond between mother and baby. Other benefits for Mom include:   You can feel proud knowing that your baby is growing healthy and strong because of your milk.   Breastmilk is convenient. It s free, clean, and always the right temperature.   Breastfeeding burns calories. This can help you lose pregnancy weight faster.   Breastfeeding releases hormones that contract the uterus. This helps the uterus return to its normal size after childbirth.   Mothers who breastfeed have a decreased risk of ovarian and breast cancers.  There are many people who can help you learn to breastfeed. A lactation consultant is a healthcare provider specially trained to help breastfeeding moms. A lactation consultant will visit you after delivery and is available after your baby is born - if you'd like to meet with lactation prior to delivery, let your doctor know!

## 2019-11-04 LAB
DATING: NORMAL
EER MATERNAL SERUM, QUAD: NORMAL
ESTIMATED DUE DATE: NORMAL
FAMILY HISTORY OF ANEUPLOIDY: NO
FAMILY HX NEURAL TUBE DEFECT: NO
GESTATIONAL AGE CALCULATED AT COLLECTION: NORMAL
HCG MOM, 2ND TRIMESTER: 1.33
INSULIN REQ MATERNAL DIABETES: NO
MATERNAL AGE AT DELIVERY: 28.8 YR
MATERNAL RACE: NORMAL
MATERNAL SCREEN INTERPRETATION: NORMAL
MATERNAL WEIGHT: NORMAL
MOM FOR AFP: 0.83
MOM FOR DIA: 0.91
MOM FOR UE3: 1.1
NUMBER OF FETUSES: NORMAL
PATIENT'S AFP: 66 NG/ML
PATIENT'S DIA: 201 PG/ML
PATIENT'S HCG, 2ND TRIMESTER: NORMAL IU/L
PATIENT'S UE3: 3.36 NG/ML
SMOKING: NO
SPECIMEN: NORMAL

## 2019-11-25 ENCOUNTER — OFFICE VISIT (OUTPATIENT)
Dept: FAMILY MEDICINE | Facility: CLINIC | Age: 29
End: 2019-11-25
Payer: COMMERCIAL

## 2019-11-25 VITALS
SYSTOLIC BLOOD PRESSURE: 92 MMHG | HEIGHT: 59 IN | OXYGEN SATURATION: 100 % | HEART RATE: 90 BPM | WEIGHT: 127.8 LBS | DIASTOLIC BLOOD PRESSURE: 56 MMHG | BODY MASS INDEX: 25.76 KG/M2 | RESPIRATION RATE: 18 BRPM | TEMPERATURE: 97.9 F

## 2019-11-25 DIAGNOSIS — B18.1 VIRAL HEPATITIS B CHRONIC (H): ICD-10-CM

## 2019-11-25 DIAGNOSIS — O09.92 HIGH-RISK PREGNANCY IN SECOND TRIMESTER: Primary | ICD-10-CM

## 2019-11-25 ASSESSMENT — MIFFLIN-ST. JEOR: SCORE: 1215.33

## 2019-11-25 NOTE — PROGRESS NOTES
"SUBJECTIVE  No vaginal bleeding, loss of fluids, or cramping noted. No headaches, change in vision, RUQ pain, or lower extremity edema. Patient has felt baby move. She has been able to tolerate a diet with no significant nausea/vomitting. She has been taking her pre-fransico vitamin, needs refill.     Continues to follow with [facility] for her chronic hep B. Has a follow up visit with them today. 19 next appointment.     OBJECTIVE  BP 92/56   Pulse 90   Temp 97.9  F (36.6  C)   Resp 18   Ht 1.499 m (4' 11\")   Wt 58 kg (127 lb 12.8 oz)   LMP 2019   SpO2 100%   BMI 25.81 kg/m      General: sitting up in a chair awake and alert. Pleasant and cooperative in NAD.  CV: acyanotic   Pulm: breathing comfortably, no increased WOB  Abd: gravid, non-tender, uterus palpated 26, FHTs 140  Extremities: soft, nontender    ASSESSMENT/PLAN    1. High-risk pregnancy in second trimester  Patient is a  a 29 year old  (twins 2nd pregnancy, delivered via c/s) female at 24w5d corresponding to JUDY of  Mar 11, 2020 obtained via LMP (consistent with 11 wk US), desires TOLAC.  - Prenatal labs unremarkable (Blood type:O+, Hgb:10.9, Syphilis, GC/Chlam, HIV negative, Rubella immune)  - Pregravid weight 119 lbs, TWG today 8 lbs  - Received flu vaccine 19  - Normal growth and development on fetal survey  - GTT, TDAP, labs, UA/Ucx next visit    2. Viral hepatitis B chronic (H)  Chronic hepatitis B with high viral load. Following with [facility], with plan to likely intiated hep B treatment at beginning of 3rd trimester to prevent vertical transmission.    3. Echogenic focus of bowel of fetus affecting antepartum care of mother, single or unspecified fetus  Echogenic focus seen in left ventricle on anatomy US scan. Normal quad screen.    -----------------------------------------    Precepted with: Dr. Anuja Ojeda DO  [facility] [place] [facility]ident  Pager #[phone]      "

## 2019-11-25 NOTE — PATIENT INSTRUCTIONS
[facility] Information  If you have any further concerns or wish to schedule another appointment, please call our office at [phone] during normal business hours (8-5, M-F).     For uncomplicated pregnancies, you will be seeing your doctor once a month until you are 28 weeks, then you will see your doctor twice a month. You will begin weekly visits at 36 weeks until you deliver.     If you have urgent medical questions that cannot wait, you may call [phone] at any time of day.     If you have a medical emergency, please call [phone].    When to call or come in to the hospital  If you notice a decrease in your baby's movement.   If your begin to experience contractions that are 5 minutes or less apart and lasting for over 45 seconds, or if contractions are becoming more painful.  If you have any bleeding or leakage of fluids.   If you have a headache not resolved with tylenol, right upper abdominal pain, or sudden onset of swelling.  You know your body best. Never hesitate to call or go to the hospital if something doesn't feel right!  Blood Glucose Screening During Pregnancy   Gestational diabetes is diabetes that only pregnant women get. Changes in your body during pregnancy can cause high blood sugar (glucose). This can cause problems for you and your baby. It is a serious condition, but it can be controlled.  What to Know If You Test Positive   Gestational diabetes is treatable. The best way to control gestational diabetes is to find out you have it as early as possible and start treatment quickly.   Gestational diabetes can cause problems for the mother during pregnancy. It can also cause problems with the baby during pregnancy, delivery, and after. Treatment greatly lowers the chance for problems.   The changes in your body that cause gestational diabetes normally occur only when you are pregnant. After the baby is born, your body goes back to normal and the condition goes away. You may be more  likely to have type 2 diabetes later, though. So talk to your doctor about ways to help prevent type 2 diabetes.  Treating Gestational Diabetes   You ll need to check your blood sugar regularly. You can do this at home by pricking your finger and checking a drop of blood on a glucose monitor. Your health care provider will show you how and when to check your blood sugar and discuss your target blood sugar level.   To manage your blood sugar, you will be given a special plan. It will likely involve planning your meals and getting regular exercise. Some women need to take a hormone called insulin, or an oral hypoglycemic medication to help control their blood sugar.    Making Plans for Feeding Your Baby  By this point, you probably have read a lot about feeding your baby. Breastfeed or formula? Each mother s decision is her own and [facility] respects you and your choices. We ve gathered information on both breastfeeding and formula feeding to help with your decision. Talking with your physician or nurse-midwife can also help in your decision. However you plan to feed your baby, [facility] encourage rooming in with your baby, skin-to-skin contact and feeding your baby based on his or her cues.    Skin-to-skin contact  Being close to mom helps your baby adjust to life outside of the womb. It helps your baby regulate their body temperature, heart rate, and breathing. Your baby will usually be placed skin-to-skin immediately following birth or as soon as possible, if medical intervention is needed.    Rooming-In  Having your baby stay with you in your room is called  rooming-in.  Keeping your baby in your room helps you to learn how to care for your baby by getting to know your baby s cues, body rhythms and sleep cycle.       Cue-based feeding  Cues (signals) are baby s way of telling you what he or she wants. When you learn your infant s cues, you know how to care for and feed your baby. Feeding  cues are the licking and smacking of lips, bringing their fist to their mouth, and a reflex called  rooting - where baby turns and opens his or her mouth, searching for the breast or bottle. Crying is a late feeding cue. Babies can feed frequently, often at least 8 times in 24 hours.  Breastfeeding facts  Breast milk is the best source of nutrition for your baby and is available at birth. In the first couple of days, your milk volume is already starting to increase, though it may not be noticeable. Breastfeed frequently to increase your milk supply. Within three to five days, you will begin to notice larger milk volumes. An increase in breast size, heaviness and firmness are often described as the milk  coming in.  Frequent breastfeeding can help breasts from getting overly firm and painful. You will know the baby is getting enough milk if your baby is having wet and dirty diapers and gaining weight.     If your goal is to exclusively breastfeed, it is important to not use any formula or artificial nipples (including bottles and pacifiers) while your baby is learning to breastfeed. While it may seem like an  easy  option to give your baby a bottle, formula should only be given if there is a medical reason for your baby to have it.    Positioning and attachment   Get comfortable. Use pillows as needed to support your arms and baby. Hold baby close at the level of your breast, facing you in a tummy to tummy position. Skin to skin helps with this. Position the baby with his or her nose by the nipple. There should be a straight line from baby s ear to shoulder to hips.  Tickle your baby s lips or wait for baby to open mouth wide, bring baby to breast by leading with the chin. Aim the nipple at the roof of baby s mouth. A rapid sucking pattern is followed by longer, drawing pattern with occasional swallows heard. When baby is correctly latched, your nipple and much of the areola are pulled well into baby s mouth.       Returning to work or school  Focus on a good start to breastfeeding. Many women continue to provide breastmilk for their baby when they return to work or school. Making plans about where to pump and store milk can make the transition go well. Talk with other mothers who have also returned to work or school for tips and support. Your employer s Human Resource department may be a resource as well.        babies can mean fewer  sick  days for you.    A quality breast pump will also save time and add comfort. Check with your insurance prior to giving birth for breast pump coverage. Many insurance companies include a pump within your benefits.    Wait until your baby is at least three weeks old to introduce a bottle for the first time. Have someone besides you give the bottle.    Breastfeed when you are with your baby. Reserve your bottles of breast milk for when you are away.     Your breasts will need to be  emptied  either by your baby or a pump. Plan to pump at least twice in an eight hour day.    If you cannot pump at work, continue breastfeeding at home. Any amount of breast milk is worth giving to your baby.    Formula feeding facts  If you are planning to use formula to feed your baby, you will want to make some preparations ahead of time. Talk to your doctor or nurse-midwife about what type of formula to use. Some are iron-fortified, meaning they have extra iron in them. You will want to purchase formula and bottles before your baby is born to be sure you are ready after you return from the hospital. The [facility] do not provide formula samples to take home.    Be sure to follow formula mixing directions closely. Regular milk in the dairy case at the grocery store should not be given to babies under 1 year old. Baby formula is sold in several forms including:    Ready-to-use. This is the most expensive, but no mixing is necessary.    Concentrated liquid. This is less expensive than  ready-to-use and you mix with water.    Powder. This is the least expensive. You mix one level scoop of powdered formula with two ounces of water and stir well.    Most babies need 2.5 ounces of formula per pound of body weight each day. This means an 8-pound baby may drink about 20 ounces of formula a day; however, this is just an estimate. The most important thing is to pay attention to your baby s cues. If your baby is always fussy, needs more iron or has certain food allergies, your physician may suggest you change your baby s formula to a different kind.   How do I warm my baby s bottles?  You may feed your baby a bottle without warming it first. It is OK for the breast milk or formula to be cool or room temperature. If your baby seems to prefer it warmed, you can put the filled bottle in a container of warm water and let it stand for a few minutes. Check the temperature of the liquid on your skin before feeding it to your baby; to be sure it isn t too hot. Do not heat bottles in the microwave. Microwaves heat food and liquids unevenly, and this can cause hot spots that can burn your baby.    How do I clean and sterilize bottles?  Sterilize bottles and nipples before you use them for the first time. You can do this by putting them in boiling water for 5 minutes. After that first time, you can wash them in hot and soapy water. Rinse them carefully to be sure there is no soap left on them. You can also wash them in the .    [facility] [phone] ([phone])

## 2019-11-25 NOTE — PROGRESS NOTES
I have reviewed the history and physical examination. I was present for key portions of the visit and agree with the assessment and plan as documented above by Dr. Ojeda for 29 yr old  @ 24w5d here for prenatal care. FHT/ FH appropriate.  Current issues include 1) Chronic Hep B - GI appt pending for tx .  /term labor precautions given.  Continue routine prenatal care.     Juan C Licea MD  2019  9:44 AM

## 2019-11-26 ENCOUNTER — MEDICAL CORRESPONDENCE (OUTPATIENT)
Dept: HEALTH INFORMATION MANAGEMENT | Facility: CLINIC | Age: 29
End: 2019-11-26

## 2019-12-05 ENCOUNTER — TRANSFERRED RECORDS (OUTPATIENT)
Dept: HEALTH INFORMATION MANAGEMENT | Facility: CLINIC | Age: 29
End: 2019-12-05

## 2019-12-15 NOTE — PROGRESS NOTES
"SUBJECTIVE  No vaginal bleeding, loss of fluids, or cramping noted. No headaches, change in vision, RUQ pain, or lower extremity edema. Patient has felt baby move. She has been able to tolerate a diet with no significant nausea/vomitting. She has been taking her pre-fransico vitamin.    Continues to follow with [facility] for her chronic hep B. Scheduled to take medication starting today, through delivery. Will follow up with them in in about a month for labs.     OBJECTIVE  BP (!) 87/54   Pulse 98   Temp 98  F (36.7  C)   Resp 18   Ht 1.499 m (4' 11\")   Wt 58.1 kg (128 lb)   LMP 2019   SpO2 97%   BMI 25.85 kg/m      General: sitting up in a chair awake and alert. Pleasant and cooperative in NAD.  CV: acyanotic   Pulm: breathing comfortably, no increased WOB  Abd: gravid, non-tender, uterus palpated 31, FHTs 150  Extremities: soft, nontender    ASSESSMENT/PLAN    1. High-risk pregnancy in second trimester  Patient is a  a 29 year old  (twins 2nd pregnancy, delivered via c/s) female at 27w5d corresponding to JUDY of  Mar 11, 2020 obtained via LMP (consistent with 11 wk US), desires TOLAC. Expecting baby boy.  - Prenatal labs unremarkable (Blood type:O+, Hgb:10.9, Syphilis, GC/Chlam, HIV negative, Rubella immune)  - Pregravid weight 119 lbs, TWG today 9 lbs  - Received flu vaccine 19  - Normal growth and development on fetal survey  - GTT, TDAP, labs this visit  - OBGYN referral for TOLAC (Predicted chance of vaginal birth after :  88.0%)  - Plans on formula feeding  - Declines circumcision  - Postpartum contraception TBD    2. Viral hepatitis B chronic (H)  Chronic hepatitis B with high viral load. Following with [facility], will start tenofovir therapy today and continue throughout delivery. Will repeat labwork with [facility] in 4 weeks. Suspected hemangioma on liver US, [facility] recommending liver CT vs MRI postpartum.    3. Anemia in pregnancy  Notified by [facility] of low hemoglobin 10.0 at most recent " visit. Obtain CBC and ferritin today and supplement with iron if needed. Educated on high-iron foods.     4. History of  delivery, currently pregnant  Prior low transverse c/s for twin gestation with transverse/transverse malpresentation. Desires TOLAC, 88% calculated percent success rate. Will have patient see Dr. Acuna, who did prior c/s.   - OB/GYN REFERRAL; Future    5. Uterine size date discrepancy pregnancy  Measuring large for dates, so will obtain growth US. Previous US at 20 weeks with normal EFW at 55th%ile.   - US OB > 14 Weeks; Future    -----------------------------------------    Precepted with: Dr. Jabari Ojeda DO  [facility] - [place] Resident  Pager #[phone]

## 2019-12-16 ENCOUNTER — OFFICE VISIT (OUTPATIENT)
Dept: FAMILY MEDICINE | Facility: CLINIC | Age: 29
End: 2019-12-16
Payer: COMMERCIAL

## 2019-12-16 VITALS
HEART RATE: 98 BPM | HEIGHT: 59 IN | BODY MASS INDEX: 25.8 KG/M2 | RESPIRATION RATE: 18 BRPM | WEIGHT: 128 LBS | OXYGEN SATURATION: 97 % | SYSTOLIC BLOOD PRESSURE: 87 MMHG | DIASTOLIC BLOOD PRESSURE: 54 MMHG | TEMPERATURE: 98 F

## 2019-12-16 DIAGNOSIS — O99.012 ANEMIA DURING PREGNANCY IN SECOND TRIMESTER: ICD-10-CM

## 2019-12-16 DIAGNOSIS — O26.849 UTERINE SIZE DATE DISCREPANCY PREGNANCY: ICD-10-CM

## 2019-12-16 DIAGNOSIS — O09.92 HIGH-RISK PREGNANCY IN SECOND TRIMESTER: Primary | ICD-10-CM

## 2019-12-16 DIAGNOSIS — O34.219 HISTORY OF CESAREAN DELIVERY, CURRENTLY PREGNANT: ICD-10-CM

## 2019-12-16 DIAGNOSIS — B18.1 VIRAL HEPATITIS B CHRONIC (H): ICD-10-CM

## 2019-12-16 LAB
FERRITIN SERPL-MCNC: 4 NG/ML (ref 10–130)
GLU GEST SCREEN 1HR 50G: 131 (ref 0–129)
HCT VFR BLD AUTO: 30.8 % (ref 35–47)
HEMOGLOBIN: 9.5 G/DL (ref 11.7–15.7)
MCH RBC QN AUTO: 25.7 PG (ref 26.5–35)
MCHC RBC AUTO-ENTMCNC: 30.8 G/DL (ref 32–36)
MCV RBC AUTO: 83.3 FL (ref 78–100)
PLATELET # BLD AUTO: 271 K/UL (ref 150–450)
RBC # BLD AUTO: 3.7 M/UL (ref 3.8–5.2)
WBC # BLD AUTO: 7.5 K/UL (ref 4–11)

## 2019-12-16 RX ORDER — FERROUS SULFATE 325(65) MG
325 TABLET ORAL
Qty: 90 TABLET | Refills: 1 | Status: SHIPPED | OUTPATIENT
Start: 2019-12-16 | End: 2020-09-24

## 2019-12-16 RX ORDER — TENOFOVIR DISOPROXIL FUMARATE 300 MG/1
300 TABLET, FILM COATED ORAL DAILY
Refills: 0 | COMMUNITY
Start: 2019-12-05 | End: 2020-09-24

## 2019-12-16 ASSESSMENT — MIFFLIN-ST. JEOR: SCORE: 1216.23

## 2019-12-16 NOTE — PATIENT INSTRUCTIONS
Iron is a mineral needed to help your body work the right way. It is found in each cell of the body and does many things. One of its most important jobs is to help the red blood cells in your blood carry oxygen to all of your tissues and body parts. If you do not have enough iron you will not have enough red blood cells. This is called iron-poor blood or anemia. Low iron in your blood and body is also called iron deficiency. Signs of low iron are always being tired and weak and looking pale.     Iron rich foods  Healthy foods which give you more iron, like:   Dark leafy greens, like spinach and collards  Beans  Red meat  Liver  Oysters, clams, shrimp, and sardines  Artichokes  Egg yolks  Chicken and turkey giblets  Tomato paste  Cereal and grains with iron added  Dried fruit, like prunes and raisins    What problems could happen?  Low iron levels can lead to iron deficiency anemia. Signs include lack of energy, problems breathing, headache, low mood, or feeling dizzy or weak.  Too much iron may lead to iron poisoning. Signs include fatigue, joint or belly pain, irregular heart rate, hair loss, changes in skin color, and organ damage. This can lead to diabetes, heart disease, arthritis, liver disease, and liver cancer.      OB/ Gyn TOLAC consultation appointment scheduled as follow:  [facility]- Dr Acuna  [address]  Phone- [phone]  Friday, 12/20/2019 at 11:20am. Please arrive by 11:10am, bring photo ID and medical insurance card. Written and verbal appointment information given to patient. Referral, demographics, prenatal records, ultrasound report/ labs have been faxed to [facility] [phone]. Mick HERNANDEZ

## 2019-12-16 NOTE — PROGRESS NOTES
Preceptor Attestation:   Patient seen, evaluated and discussed with the resident. I have verified the content of the note, which accurately reflects my assessment of the patient and the plan of care.  Supervising Physician:Valeri Barboza MD  [facility]

## 2019-12-17 LAB — TREPONEMA ANTIBODY (SYPHILIS): NEGATIVE

## 2019-12-19 ENCOUNTER — TELEPHONE (OUTPATIENT)
Dept: FAMILY MEDICINE | Facility: CLINIC | Age: 29
End: 2019-12-19

## 2019-12-19 ENCOUNTER — RECORDS - HEALTHEAST (OUTPATIENT)
Dept: ADMINISTRATIVE | Facility: OTHER | Age: 29
End: 2019-12-19

## 2019-12-19 NOTE — TELEPHONE ENCOUNTER
Will complete 3 hr gtt on 12/30/2019- at 9am, reminded to come in fasting. Unable to come in at 8:30am.  Rescheduled growth US for tomorrow, 12/20/2019 at 3:00 pm at [facility]. Patient informed to have full bladder prep, drink 24 oz one hour prior to US. Order has been faxed to scheduling [phone]. Mick HERNANDEZ

## 2019-12-20 ENCOUNTER — HOSPITAL ENCOUNTER (OUTPATIENT)
Dept: ULTRASOUND IMAGING | Facility: HOSPITAL | Age: 29
Discharge: HOME | End: 2019-12-20
Attending: FAMILY MEDICINE

## 2019-12-20 DIAGNOSIS — O26.849 UTERINE SIZE-DATE DISCREPANCY: ICD-10-CM

## 2019-12-30 ENCOUNTER — OFFICE VISIT (OUTPATIENT)
Dept: FAMILY MEDICINE | Facility: CLINIC | Age: 29
End: 2019-12-30
Payer: COMMERCIAL

## 2019-12-30 VITALS
OXYGEN SATURATION: 100 % | WEIGHT: 127.8 LBS | DIASTOLIC BLOOD PRESSURE: 50 MMHG | TEMPERATURE: 97.6 F | BODY MASS INDEX: 25.76 KG/M2 | SYSTOLIC BLOOD PRESSURE: 80 MMHG | HEIGHT: 59 IN | HEART RATE: 66 BPM | RESPIRATION RATE: 18 BRPM

## 2019-12-30 DIAGNOSIS — O99.013 ANEMIA DURING PREGNANCY IN THIRD TRIMESTER: ICD-10-CM

## 2019-12-30 DIAGNOSIS — O09.93 HIGH-RISK PREGNANCY IN THIRD TRIMESTER: Primary | ICD-10-CM

## 2019-12-30 DIAGNOSIS — B18.1 VIRAL HEPATITIS B CHRONIC (H): ICD-10-CM

## 2019-12-30 DIAGNOSIS — O09.92 HIGH-RISK PREGNANCY IN SECOND TRIMESTER: ICD-10-CM

## 2019-12-30 DIAGNOSIS — R73.09 ABNORMAL GLUCOSE TOLERANCE TEST: ICD-10-CM

## 2019-12-30 LAB
GLU, 1 HOUR, 100 G: 139 MG/DL (ref 0–180)
GLU, 2 HOUR, 100 G: 123 MG/DL (ref 0–155)
GLU, 3 HOUR, 100 G: 116 MG/DL (ref 0–140)
GLUCOSE P FAST SERPL-MCNC: 87 MG/DL

## 2019-12-30 ASSESSMENT — MIFFLIN-ST. JEOR: SCORE: 1215.33

## 2019-12-30 NOTE — PROGRESS NOTES
"SUBJECTIVE  No vaginal bleeding, loss of fluids, or cramping noted. No headaches, change in vision, or lower extremity edema. Baby is active. She has been taking her pre- vitamin and oral iron.     Met with kyaw Bruno for TOLAC. Continues to follow with [facility], on tenofovir. Failed prior 1 hr GTT but passed 3 hr GTT. No orthostasis, lightheadedness, dizziness.     OBJECTIVE  BP (!) 80/50   Pulse 66   Temp 97.6  F (36.4  C)   Resp 18   Ht 1.499 m (4' 11\")   Wt 58 kg (127 lb 12.8 oz)   LMP 2019   SpO2 100%   BMI 25.81 kg/m      General: sitting up in a chair awake and alert. Pleasant and cooperative in NAD.  CV: acyanotic   Pulm: breathing comfortably, no increased WOB  Abd: gravid, non-tender, uterus palpated 31, FHTs 120  Extremities: soft, nontender    EXAM: US OB FOLLOW UP  LOCATION: [facility]  DATE/TIME: 2019 3:16 PM    INDICATION: Uterine size-date discrepancy, unspecified trimester.  COMPARISON: 10/25/2019.  TECHNIQUE: Routine.    FINDINGS: Single intrauterine gestation, vertex presentation. Placenta is located anterior. Amniotic fluid is upper normal. The single deepest pocket is 9.9 cm, however, amniotic fluid index is within normal limits at 24 cm. Uterus is normal. Maternal   adnexa (right and left ovaries) show no abnormalities.    FETAL ANOMALY SCREEN: Survey of the fetal anatomy is not repeated today.    BIOMETRY:  Biparietal Diameter: 7.35 cm, 29 weeks 4 days  Head Circumference: 27.23 cm, 29 weeks 6 days  Abdominal Circumference: 25.27 cm, 29 weeks 4 days  Femur Length: 5.05 cm, 27 weeks 1 day    Estimated Fetal Weight: 1273 +/- 186 g  EFW Percentile: 48 percent    Fetal Heart Rate: 147 bpm  Cervical Length: 3.9 cm  Distance from Placenta to Cervix: No previa    EDC by First US exam: 2020  EDC by This US exam: 2020    Composite Age by First US: 28 weeks 6 days   Composite Age by This US: 29 weeks 1 day    IMPRESSION:   CONCLUSION:  1. Single living " intrauterine gestation.  2. Based on the first ultrasound, composite age of 28 weeks 6 days with EDC 2020.  3. Normal interval growth.  4. Amniotic fluid index is at the upper limits of normal at 24 cm.      ASSESSMENT/PLAN    1. High-risk pregnancy in second trimester  Patient is a  a 29 year old  (twins 2nd pregnancy, delivered via c/s) female at 29w5d corresponding to JUDY of  Mar 11, 2020 obtained via LMP (consistent with 11 wk US), desires TOLAC. Expecting baby boy.  - Hypotensive 80/50 today, asymptomatic without concerns for infection. Discussed increasing hydration and monitoring for orthostasis.   - Prenatal labs unremarkable (Blood type:O+, Hgb:10.9-->9.5, Syphilisx2, GC/Chlam, HIV negative, Rubella immune)  - Pregravid weight 119 lbs, TWG today 8 lbs  - Received flu vaccine 19  - Normal growth and development on fetal survey  - Failed 1 hr GTT, 3 hr GTT to be performed today  - Received TDAP 19  - OBGYN referral for TOLAC (Predicted chance of vaginal birth after :  88.0%)  - Plans on formula feeding  - Declines circumcision    2. Viral hepatitis B chronic (H)  Chronic hepatitis B with high viral load. Following with [facility], will start tenofovir therapy today and continue throughout delivery. Suspected hemangioma on liver US, [facility] recommending liver CT vs MRI postpartum.    3. Anemia in pregnancy  Hgb low at 9.5, MCV 83 with ferritin of 4. Currently on iron supplementation.     4. History of  delivery, currently pregnant  Prior low transverse c/s for twin gestation with transverse/transverse malpresentation. Desires TOLAC, 88% calculated percent success rate. Met with Dr. Acuna who gave verbal okay, but will await documentation.     5. Uterine size date discrepancy pregnancy  Measuring large for dates, follow up US showed normal interval growth, EFW 48%ile. EBER upper limit of normal at 24 cm.     -----------------------------------------    Precepted with:   Anuja Ojeda DO  [facility] Resident  Pager #[phone]

## 2019-12-30 NOTE — PATIENT INSTRUCTIONS
[facility] Information  If you have any further concerns or wish to schedule another appointment, please call our office at [phone] during normal business hours (8-5, M-F).     For uncomplicated pregnancies, you will be seeing your doctor once a month until you are 28 weeks, then you will see your doctor twice a month. You will begin weekly visits at 36 weeks until you deliver.     If you have urgent medical questions that cannot wait, you may call [phone] at any time of day.     If you have a medical emergency, please call [phone].    When to call or come in to the hospital  If you notice a decrease in your baby's movement.   If your begin to experience contractions that are 5 minutes or less apart and lasting for over 45 seconds, or if contractions are becoming more painful.  If you have any bleeding or leakage of fluids.   If you have a headache not resolved with tylenol, right upper abdominal pain, or sudden onset of swelling.  You know your body best. Never hesitate to call or go to the hospital if something doesn't feel right!  After-baby Birth Control Methods   It is important to consider contraception after your baby is born if you would like to prevent a pregnancy in the near future. If you are breast feeding, talk with your doctor to determine the best method for you. It is recommended that you wait 12 months after the birth of your baby to get pregnant again.   Condoms   Latex condoms can prevent pregnancy and STDs. Condoms work best when used with spermicide that is placed inside the vagina as well as inside the condom. Use only water-based lubricants. Petroleum based products (such as Vaseline and many massage oils) can weaken the latex and cause it to break.   Iud   IUD's are made of flexible plastic and must be inserted into your uterus by a doctor. The IUD works by stopping the fertilized egg from attaching itself to the uterus. IUDs may increase the risk of getting a pelvic  infection (PID), which can lead to infertility if not diagnosed and treated early. This is a great option after delivery of your baby! These last for 3, 5, or 10 years depending up on which type you choose, but can be removed earlier if you decide you would like to get pregnant sooner.  Tubal Ligation & Vasectomy   These are good choices for women and men who know that they do not want to have any more children.     HORMONES   Birth control pills, hormone implants and shots work by stopping ovulation (release of the egg from the ovary). Implants are placed in the upper arm by a minor surgical incision. They last for five years, but can be removed by your doctor if you decide to get pregnant. Hormone injections must be repeated every three months. The Pill must be taken every day.   All hormones can have side effects and create certain health risks. They are very effective in preventing pregnancy but they do not prevent STDs. You can talk more about the risks and benefits with your doctor.

## 2019-12-30 NOTE — PROGRESS NOTES
I have reviewed the history and physical examination. I was present for key portions of the visit and agree with the assessment and plan as documented above by Dr. Ojeda for 29 yr old  @ 29w 5d here for prenatal care. FHT appropriate/ FH > dates but consistent w/ previous weeks.  Current issues include 1) previous  -> TOLAC counseling done 2) Chronic Hep B - on meds per [facility] 3) Anemia - on iron 4) failed 1hr gtt - 3hr testing today.  /term labor precautions given.  Continue routine prenatal care.     Juan C Licea MD  2019  9:50 AM

## 2020-01-16 ENCOUNTER — TELEPHONE (OUTPATIENT)
Dept: FAMILY MEDICINE | Facility: CLINIC | Age: 30
End: 2020-01-16

## 2020-01-16 ENCOUNTER — HOSPITAL ENCOUNTER (OUTPATIENT)
Dept: OBGYN | Facility: HOSPITAL | Age: 30
Discharge: HOME | End: 2020-01-16
Attending: FAMILY MEDICINE | Admitting: FAMILY MEDICINE

## 2020-01-16 DIAGNOSIS — B96.89 BACTERIAL VAGINOSIS: ICD-10-CM

## 2020-01-16 DIAGNOSIS — N76.0 BACTERIAL VAGINOSIS: ICD-10-CM

## 2020-01-16 LAB
ALBUMIN UR-MCNC: NEGATIVE MG/DL
APPEARANCE UR: CLEAR
BACTERIA #/AREA URNS HPF: ABNORMAL HPF
BILIRUB UR QL STRIP: NEGATIVE
CLUE CELLS: ABNORMAL
COLOR UR AUTO: YELLOW
GLUCOSE UR STRIP-MCNC: NEGATIVE MG/DL
HGB UR QL STRIP: ABNORMAL
KETONES UR STRIP-MCNC: NEGATIVE MG/DL
LEUKOCYTE ESTERASE UR QL STRIP: NEGATIVE
NITRATE UR QL: NEGATIVE
PH UR STRIP: 7 [PH] (ref 4.5–8)
RBC #/AREA URNS AUTO: ABNORMAL HPF
RUPTURE OF FETAL MEMBRANES BY ROM PLUS: NEGATIVE
SP GR UR STRIP: 1 (ref 1–1.03)
SQUAMOUS #/AREA URNS AUTO: ABNORMAL LPF
TRICHOMONAS, WET PREP: ABNORMAL
UROBILINOGEN UR STRIP-ACNC: ABNORMAL
WBC #/AREA URNS AUTO: ABNORMAL HPF
YEAST, WET PREP: ABNORMAL

## 2020-01-16 ASSESSMENT — MIFFLIN-ST. JEOR: SCORE: 1224.38

## 2020-01-16 NOTE — TELEPHONE ENCOUNTER
Spoke with Yo who is currently 32 weeks and 1 day gestation. C/o after urinating wiped self, stood up from toilet and felt a gush of bright, red blood trickle down her leg. No abdominal pain, discomfort or contractions felt. Prior to this, had good fetal movement. No recent intercourse, last encounter 1 month ago. Recommend she go into maternity for further assessment and evaluation. She would prefer to come into clinic for evaluation prior to going into hospital. At patient's request, discussed above with Dr Ojeda who agrees she needs to go to maternity. Yo informed.     I called [facility] to inform Caitlyn HERNANDEZ OB charge of information above due to difficulty with language and patient request. Per patient once she arranges babysitting she will expect to arrive in maternity within the hour.  Dr Ojeda informed and will have her pager on her.  Mick HERNANDEZ

## 2020-01-17 ENCOUNTER — OFFICE VISIT (OUTPATIENT)
Dept: FAMILY MEDICINE | Facility: CLINIC | Age: 30
End: 2020-01-17
Payer: COMMERCIAL

## 2020-01-17 VITALS
OXYGEN SATURATION: 100 % | HEART RATE: 81 BPM | DIASTOLIC BLOOD PRESSURE: 59 MMHG | SYSTOLIC BLOOD PRESSURE: 91 MMHG | TEMPERATURE: 98.2 F | RESPIRATION RATE: 16 BRPM | BODY MASS INDEX: 26.66 KG/M2 | WEIGHT: 132 LBS

## 2020-01-17 DIAGNOSIS — Z34.83 ENCOUNTER FOR SUPERVISION OF OTHER NORMAL PREGNANCY IN THIRD TRIMESTER: Primary | ICD-10-CM

## 2020-01-17 NOTE — PROGRESS NOTES
"       HPI:     Yo Aragon is a 29 year old  female at 32w2d who presents for follow up of visit at [place] yesterday.     Yo was seen in the ED yesterday by Dr. Ojeda. Summary of stay is as follows:    \"29 year old  at 32w1d who presented with one episode of suspected vaginal bleeding which has since resolved. Unclear if bleeding actually vaginal or related to hemorrhoids given history of visible blood after straining to have a BM, and only minor pink-tinged fluid on cervical exam without blood in vault. No abdominal pain or evidence of placental pathology (including previa, decreased cervical length or abruption) on US. Intermittent contractions, which have spaced to every 5 min with no cervical change on exam and category 1 tracing. Uterine irritability possible in the setting of dehydration and BV so will have patient increase hydration and treat with oral flagyl. Strict return precautions discussed including recurrent bleeding, abdominal pain, or decreased fetal movement. She is scheduled for follow up in clinic tomorrow afternoon.\"    Felt some contractions while she was at the hospial but none today, No vaginal bleeding, baby is moving a lot, no LOF, no abnormal vaginal discharge. She was told to stay hydrated and she has been working hard to drink a lot of water. She was also told to start metronidazole for BV but she hasn't picked it up yet from the pharmacy. Will be going there after this appointment.              PMHX:     Patient Active Problem List   Diagnosis     Anemia of pregnancy      delivery delivered     Viral hepatitis B chronic (H)     High-risk pregnancy in third trimester       Current Outpatient Medications   Medication Sig Dispense Refill     ferrous sulfate (FEROSUL) 325 (65 Fe) MG tablet Take 1 tablet (325 mg) by mouth daily (with breakfast) 90 tablet 1     Prenatal Vit-Fe Fumarate-FA (PRENATAL MULTIVITAMIN W/IRON) 27-0.8 MG tablet Take 1 tablet by mouth daily " 90 tablet 1     tenofovir (VIREAD) 300 MG tablet Take 300 mg by mouth daily  0       Social History     Tobacco Use     Smoking status: Passive Smoke Exposure - Never Smoker     Smokeless tobacco: Never Used     Tobacco comment: vapor exposure    Substance Use Topics     Alcohol use: Not Currently     Frequency: Never     Drug use: Never       Social History     Social History Narrative     Not on file       Allergies   Allergen Reactions     No Known Allergies        No results found for this or any previous visit (from the past 24 hour(s)).         Review of Systems:   7 point ROS negative except as noted.           Physical Exam:     Vitals:    20 1305   BP: 91/59   Pulse: 81   Resp: 16   Temp: 98.2  F (36.8  C)   TempSrc: Oral   SpO2: 100%   Weight: 59.9 kg (132 lb)     Body mass index is 26.66 kg/m .    General: Alert, well-appearing female in NAD  HEENT: PERRL, moist oral mucus membranes  Pulm: CTA BL, no tachypnea  CV: RRR, no murmur  Abd: FH: 34 cm , FHT: 140, [place] shows vertex presentation  Ext: Warm, well perfused, 2+ BL radial pulses, no LE edema  Skin: No rash on limited skin exam  Psych: Mood appropriate to visit content, full affect, rational thought content and process      Assessment and Plan      F at 32w4d who presents after visit to labor and delivery unit for vaginal bleeding. Her vaginal bleeding has completely resolved. She was diagnosed with BV and will start treatment with flagyl today. She has been focused on staying hydrated. Was supposed to have prenatal visit with Rusty this week, I advised her to reschedule it for next week. She continues to measure higher than dates.       Valeri Barboza MD  [facility]   Pager: [phone]

## 2020-01-27 NOTE — PROGRESS NOTES
"SUBJECTIVE  No vaginal bleeding since prior episode for which she was evaluating at L&D for. No loss of fluids, or cramping noted. No headaches, change in vision, or lower extremity edema. Baby is active. She has been taking her pre- vitamin and oral iron.     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    Stopped taking iron 20due to constipation.     Needs to reschedule with [facility] for chronic hepatitis B.     OBJECTIVE  /69   Pulse 109   Temp 97.7  F (36.5  C)   Resp 20   Ht 1.499 m (4' 11\")   Wt 62.4 kg (137 lb 9.6 oz)   LMP 2019   SpO2 99%   BMI 27.79 kg/m      General: sitting up in a chair awake and alert. Pleasant and cooperative in NAD.  CV: acyanotic   Pulm: breathing comfortably, no increased WOB  Abd: gravid, non-tender, uterus palpated 35, FHTs 145  Extremities: soft, nontender     ASSESSMENT/PLAN    1. High-risk pregnancy in third trimester  Patient is a  a 29 year old  (twins 2nd pregnancy, delivered via c/s) female at 33w6d corresponding to JUDY of  Mar 11, 2020 obtained via LMP (consistent with 11 wk US), desires TOLAC. Expecting baby boy. Episode of vaginal vs hemorrhoidal bleeding at 32w1d with reassuring workup in L&D and no recurrence.   - Prenatal labs unremarkable (Blood type:O+, Hgb:10.9-->9.5, Syphilisx2, GC/Chlam, HIV negative, Rubella immune)  - Pregravid weight 119 lbs, TWG today 18 lbs  - Received flu vaccine 19  - Normal growth and development on fetal survey  - Failed 1 hr GTT, passed 3 hr  - Received TDAP 19  - OBGYN referral for TOLAC (Predicted chance of vaginal birth after :  88.0%)  - Plans on formula feeding  - Declines circumcision  - Wants to try for natural delivery    2. Viral hepatitis B chronic (H)  Chronic hepatitis B with high viral load. Following [facility], will start tenofovir therapy today and continue throughout delivery. Suspected hemangioma on liver US, [facility] recommending liver " CT vs MRI postpartum.    3. Anemia in pregnancy  Hgb low at 9.5, MCV 83 with ferritin of 4. Stopped iron supplement 2 weeks ago due to constipation. Willing to restart with the additional of colace.     4. History of  delivery, currently pregnant  Prior low transverse c/s for twin gestation with transverse/transverse malpresentation. Desires TOLAC, 88% calculated percent success rate. Met with Dr. Acuna who gave verbal okay, but will await documentation.     5. Uterine size date discrepancy pregnancy  Continues to measure slightly large for dates, follow up US showed normal interval growth, EFW 48%ile. EBER upper limit of normal at 24 cm.     6. Slow transit constipation  Educated on increasing water intake.   - docusate sodium (COLACE) 100 MG capsule; Take 1 capsule (100 mg) by mouth 2 times daily as needed for constipation  Dispense: 90 capsule; Refill: 1  -----------------------------------------    Precepted with: Dr. Jabari Ojeda DO  [facility] Resident  Pager #[phone]

## 2020-01-28 ENCOUNTER — OFFICE VISIT (OUTPATIENT)
Dept: FAMILY MEDICINE | Facility: CLINIC | Age: 30
End: 2020-01-28
Payer: COMMERCIAL

## 2020-01-28 VITALS
OXYGEN SATURATION: 99 % | WEIGHT: 137.6 LBS | TEMPERATURE: 97.7 F | DIASTOLIC BLOOD PRESSURE: 69 MMHG | HEART RATE: 109 BPM | SYSTOLIC BLOOD PRESSURE: 104 MMHG | RESPIRATION RATE: 20 BRPM | HEIGHT: 59 IN | BODY MASS INDEX: 27.74 KG/M2

## 2020-01-28 DIAGNOSIS — B18.1 VIRAL HEPATITIS B CHRONIC (H): ICD-10-CM

## 2020-01-28 DIAGNOSIS — O99.013 ANEMIA DURING PREGNANCY IN THIRD TRIMESTER: ICD-10-CM

## 2020-01-28 DIAGNOSIS — O09.93 HIGH-RISK PREGNANCY IN THIRD TRIMESTER: Primary | ICD-10-CM

## 2020-01-28 DIAGNOSIS — K59.01 SLOW TRANSIT CONSTIPATION: ICD-10-CM

## 2020-01-28 RX ORDER — DOCUSATE SODIUM 100 MG/1
100 CAPSULE, LIQUID FILLED ORAL 2 TIMES DAILY PRN
Qty: 90 CAPSULE | Refills: 1 | Status: SHIPPED | OUTPATIENT
Start: 2020-01-28 | End: 2020-09-24

## 2020-01-28 ASSESSMENT — MIFFLIN-ST. JEOR: SCORE: 1259.78

## 2020-01-28 NOTE — PATIENT INSTRUCTIONS
Pregnancy Safe OTC Medications  Over the counter medications should be used sparingly and as directed. It is important to read the label to confirm ALL ingredients prior to use. If symptoms persist or worsen, call our office.     Allergies  Avoid Sudafed (especially in the 1st trimester).  Diphenhydramine (Benadryl )  Loratidine (Claritin )  Cetirizine (Zyrtec )    Cold/Flu  Rest, increase fluids, air humidifier. Do not take Sudafed, Oxymetazoline (Afrin ) nasal spray, or products that contain alcohol.  Saline nasal drops or spray  Warm salt/water gargle  Ki's VaporRub    Halls'( ) cough drops, Cepacol ( ) lozenges  Dextromethorphan (Robitussin )  Guaifenesin (Mucinex )     Constipation  Increase dietary fibers (fruits, vegetables, grains), fluids and exercise.  Fiber supplement (Metamucil , Fiberall , FiberCon )  Polyethylene glycol (MiraLAX )  Milk of Magnesia   Docusate (Colace , Ivy-Colace )  Senna (Senokot , Ivy-Colace )    Diarrhea  Increase fluids, follow a bland diet. Avoid Imodium  and Lomotil .     Gas  Simethicone (Gas-X , Mylicon , Mylanta Gas )    Heartburn/Indigestion  Do not recline after meals. Try to sleep with your head elevated. Do not take PeptoBismol or Latia-seltzer  Calcium carbonate (Tums , Rolaids )  Aluminum hydroxide and magnesium hydroxide (Maalox , Mylanta )  Famotidine (Pepcid AC )  Milk of magnesia    Hemorrhoids  Try sitz baths.   Tucks pads  Hydrocortisone cream  Topical phenylephrine (Preparation H )    Insomnia  Benadryl   Doxylamine    Nausea/Vomiting  Veronica products  Veronica capsules (250 mg up to 4 times daily)  Vitamin B6 10-25 mg up to 3 times per day  Doxylamine 1/2 tab (12.5 mg) of 25 mg tablet up to three times daily (can cause drowsiness)     Pain/Headache  Increase fluids. Do not take ibuprofen (Advil , Motrin ), naproxen (Aleve ), or aspirin unless advised by a provider (please note these are commonly combined in OTC products).  Acetaminophen (Tylenol ) - max dose  3000 mg per day    Yeast infection  Clotrimazole (Lotrimin 7 )  Miconazole (Monistat 7 )  Contact your physician to make sure you do not have a more serious infection. 7 day products are preferred over one or three day products.

## 2020-01-30 ENCOUNTER — TELEPHONE (OUTPATIENT)
Dept: FAMILY MEDICINE | Facility: CLINIC | Age: 30
End: 2020-01-30

## 2020-01-30 NOTE — TELEPHONE ENCOUNTER
Called patient as Dr Ojeda completed the forms. Patient still needed to sign her portion. I called patient to come in and fill rest out patient stated she will be here at 3pm today to fill out. Left forms at .  will copy after she fills them out and fax then to HR. Post IT with Hr fax number left on forms by Dr Ojeda.

## 2020-02-12 ENCOUNTER — OFFICE VISIT (OUTPATIENT)
Dept: FAMILY MEDICINE | Facility: CLINIC | Age: 30
End: 2020-02-12
Payer: COMMERCIAL

## 2020-02-12 VITALS
OXYGEN SATURATION: 100 % | DIASTOLIC BLOOD PRESSURE: 63 MMHG | WEIGHT: 138.2 LBS | TEMPERATURE: 97.7 F | SYSTOLIC BLOOD PRESSURE: 92 MMHG | HEIGHT: 59 IN | RESPIRATION RATE: 19 BRPM | BODY MASS INDEX: 27.86 KG/M2 | HEART RATE: 74 BPM

## 2020-02-12 DIAGNOSIS — O99.013 ANEMIA DURING PREGNANCY IN THIRD TRIMESTER: ICD-10-CM

## 2020-02-12 DIAGNOSIS — O09.93 HIGH-RISK PREGNANCY IN THIRD TRIMESTER: Primary | ICD-10-CM

## 2020-02-12 DIAGNOSIS — B18.1 VIRAL HEPATITIS B CHRONIC (H): ICD-10-CM

## 2020-02-12 DIAGNOSIS — O34.219 HISTORY OF CESAREAN DELIVERY, CURRENTLY PREGNANT: ICD-10-CM

## 2020-02-12 ASSESSMENT — MIFFLIN-ST. JEOR: SCORE: 1262.5

## 2020-02-12 NOTE — PROGRESS NOTES
Preceptor Attestation:   Patient seen, evaluated and discussed with the resident. I have verified the content of the note, which accurately reflects my assessment of the patient and the plan of care.  Supervising Physician:Eldon Castelan MD  [facility]

## 2020-02-12 NOTE — PATIENT INSTRUCTIONS
[facility] Information  If you have any further concerns or wish to schedule another appointment, please call our office at [phone] during normal business hours (8-5, M-F).     For uncomplicated pregnancies, you will be seeing your doctor once a month until you are 28 weeks, then you will see your doctor twice a month. You will begin weekly visits at 36 weeks until you deliver.     If you have urgent medical questions that cannot wait, you may call [phone] at any time of day.     If you have a medical emergency, please call [phone].    When to call or come in to the hospital  If you notice a decrease in your baby's movement.   If your begin to experience contractions that are 5 minutes or less apart and lasting for over 45 seconds, or if contractions are becoming more painful.  If you have any bleeding or leakage of fluids.   If you have a headache not resolved with tylenol, right upper abdominal pain, or sudden onset of swelling.  You know your body best. Never hesitate to call or go to the hospital if something doesn't feel right!    Preparing for the hospital  You re likely feeling anxious as your child s birth approaches. This is normal. To give yourself some peace of mind, pack a bag 3-4 weeks before your due date. Here is a list of things to remember:  Personal care items like toothbrush, hair brush, lip balm, lotion, shampoo, glasses, contacts  Nightgown, bathrobe, slippers  Several pairs of underwear  Comfortable clothes for you to wear home  Camera with new batteries  Cell phone and   Insurance information and any other paperwork needed for your hospital stay  Clothes for baby to wear home  An infant, rear-facing car seat for bringing home your baby (this is required by law)  What Is Group B Strep?   Group B strep (streptococcus) is a common bacteria. It can grow in a woman s vagina, rectum, or urinary tract. It is almost always harmless in adults. But in rare cases, a woman who has  group B strep can infect her baby during the birth. Infection can cause serious illness in the . The good news is that treating the mother during labor reduces the risk of the baby becoming infected. And if a  is infected, the infection can be treated. You will be screened for Group B strep at 35-36 weeks gestation.  Facts about group B strep   Learning more about group B strep can help you understand how testing and treatment can help. Here are some basic facts about group B strep:   It is not a sexually transmitted disease.   It is not the same as strep throat. (This is caused by group A strep.)   It often has no symptoms and may cause no problems in adults.   Group B strep can be transmitted during vaginal delivery. It cannot be passed during  (surgical) birth.   A mother with group B strep rarely infects her . (Infection occurs only about 1% to 2% of the time.)   When a mother is treated during labor and delivery, her baby almost never becomes infected.   Certain factors during pregnancy increase the risk of a baby becoming infected.  Possible effects on your baby   Group B strep can infect the blood. It can also cause inflammation of the baby s lungs, brain, or spinal cord. Long-term effects can include blindness, deafness, mental retardation, or cerebral palsy. And in rare cases, infection causes death. Infection is most often detected soon after the baby is born.   How your baby may become infected   Group B strep often lives in the vagina or rectum. If the amniotic sac breaks early, bacteria from the vagina can travel to the uterus, reaching the baby. Or, as the baby passes through the birth canal, it can come in contact with the bacteria. In rare cases, group B strep can also be passed to the baby after delivery. This is called late-onset group B strep. The source of this type of infection is not well understood. But some experts believe that it happens if the baby is exposed to  group B strep in the home, from the parents or siblings, or in the community.   What increases the risk?   Certain risk factors increase the chance that a baby will be infected. They include:   Breaking or leaking of the amniotic sac earlier than 37 weeks gestation   Labor earlier than 37 weeks gestation   Breaking of the amniotic sac more than 18 hours before labor begins   Fever during labor   A urinary tract infection with group B strep at any point in the pregnancy   A previous baby born with a group B strep infection    BaBaby Feeding in the Hospital: Information, Support and      Resources    As As you prepare for the birth of your child, you will want to consider options for feeding your baby cluding breast-feeding and/or baby formula. The American Academy of Pediatrics recommends exclusive breast-feeding for the first six months (although any amount of breast-feeding is beneficial).  However, we also understand that breast-feeding is  a personal choice and not for everyone. Whether or not you choose to breast-feed, your decision will be respected by our staff.        There are numerous benefits of breast-feeding; here are a few to consider:    Provides antibodies to protect your baby from infections and diseases    The cost: formula can cost over $1,500 per year    Convenience, no warming up or sterilizing bottles and supplies    The physical contact with breastfeeding can make babies feel secure, warm and comforted        What ever my feeding choice, what can I expect after I deliver my baby?    Your baby will usually be placed skin-to-skin immediately following birth. The skin to skin contact between you and your baby will be a special and memorable time. The bonding and attachment comforts your baby and has a positive effect on baby s brain development.     Having your baby  room in  with you also helps you start to learn your baby s body rhythms and sleep cycle.      You will also begin to learn your  baby s cues (signals) that he or she is ready to feed.        When do I start to feed my baby?         As soon as possible after your baby s birth, you will be encouraged to begin feeding. In the first couple of weeks, your baby will eat often. Breastfeeding babies usually eat at least 8 times in 24 hours. Babies fed formula usually eat at least 7 times in 24 hours.           Breast-feeding tips:    Get comfortable and use pillows for support.    Have your baby at the level of your breast, facing you,  tummy to tummy.       Touch your nipple to your baby s lips so your baby s mouth opens wide (rooting reflex).  Aim the nipple toward the roof of your baby s mouth. When your baby opens his or her mouth, pull your baby toward your breast to help your baby  latch on  to your nipple and much of the areola area.    Hand expressing your breast milk can assist with latching your baby to your breast, if needed.    Ask for help, breastfeeding may seem awkward or uncomfortable at first, this is normal. There are numerous resources available at [facility] hospitals, clinics and beyond.     If your goal is to exclusively breastfeed, avoid using any formula or artificial nipples (including bottles and pacifiers) while you and your baby are learning to breastfeed unless there is a medical reason.       Mixing breastfeeding and formula can interfere with how you begin building your milk supply. It can impact how you and your baby learn to breastfeeding together and alter the natural growth of  good  bacteria in your baby s stomach.  Breast-feeding tips (cont.)    Delay a pacifier or a bottle in the first few weeks until breastfeeding is well established. This is often around 3 weeks of age.    Ask your nurse to show you how to hand express. Breast milk can be kept in the refrigerator orfreezer for later use.     Hospital Resources:  [facility] Lactation Clinics located at [facility], [facility] and [facility]  [employer]  Call: [phone].    Inpatient support    Outpatient appointments    Telephone consultation    Breast-feeding classes available through [employer]      Online Resources:    [email or web address] sign up for free online weekly e-mail    [email or web address]/maternity    Breastfeedingmadesimple.[email or web address]    [employer] (La Leche League)    Normalfed.com    [facility].gov/breastfeeding    Workandpump.com    Breast-feeding Supplies & Pumps:  Talk to your insurance provider or WIC (Women, Infants and Children) to learn more about options available to you. Recent health insurance changes may include additional coverage for supplies and pumps.    Public Health:  Women, Infants and Children Nutrition program (WIC): provides breast-feeding support and education in addition to formal feeding moms. 360-RQG-2925 or [email or web address]    Family Health Home Visiting: [facility] Nurse home visits are available. Talk to your provider to see if you qualify. Most [facility] have a program available.    Additional Resources:  La Leche League is an international, nonprofit, nonsectarian organization offering information, education, and support to mothers who want to breast-feed their babies. Local groups offer phone help and monthly meetings. Visit [employer] or ticketscript.[employer] and us the  Find local support  drop down menu or click on the  Resources  tab.    [place] Breastfeeding Resources: 8-917-932-BABY (2225) toll free    National Breastfeeding Help Line trained breastfeeding peer counselors can help answer common breast-feeding questions by phone. Monday-Friday: English/Mohawk  3-911- [phone] toll free, [phone] (TTY)    Care Connection: [phone] ([phone])

## 2020-02-12 NOTE — PROGRESS NOTES
"SUBJECTIVE  No vaginal bleeding since prior episode for which she was evaluating at L&D for. No loss of fluids, or cramping noted. No headaches, change in vision, or lower extremity edema. Baby is active. She has been taking her pre- vitamin and oral iron.     Induced for postdates for her first son. Twins were . Asking about elective induction because she is very tired.    OBJECTIVE  BP 92/63   Pulse 74   Temp 97.7  F (36.5  C)   Resp 19   Ht 1.499 m (4' 11\")   Wt 62.7 kg (138 lb 3.2 oz)   LMP 2019   SpO2 100%   BMI 27.91 kg/m      General: sitting up in a chair awake and alert. Pleasant and cooperative in NAD.  CV: acyanotic   Pulm: breathing comfortably, no increased WOB  Abd: gravid, non-tender, uterus palpated 38, FHTs 145, vertex on bedside US  GYN: posterior cervix, 1/10/-2, soft  Extremities: soft, nontender     ASSESSMENT/PLAN    1. High-risk pregnancy in third trimester  Patient is a  a 29 year old  (twins 2nd pregnancy, delivered via c/s) female at 36w0d corresponding to JUDY of  Mar 11, 2020 obtained via LMP (consistent with 11 wk US), desires TOLAC. Expecting baby boy. Episode of vaginal vs hemorrhoidal bleeding at 32w1d with reassuring workup in L&D and no recurrence.   - Prenatal labs unremarkable (Blood type:O+, Hgb:10.9-->9.5, Syphilisx2, GC/Chlam, HIV negative, Rubella immune)  - Pregravid weight 119 lbs, TWG today 19 lbs  - Received flu vaccine 19  - Normal growth and development on fetal survey  - Failed 1 hr GTT, passed 3 hr  - Received TDAP 19  - OBGYN referral for TOLAC (Predicted chance of vaginal birth after :  88.0%)  - Plans on formula feeding  - Declines circumcision  - Wants to try for natural delivery  -GBS today    2. Viral hepatitis B chronic (H)  Chronic hepatitis B with high viral load. Following with [facility], will start tenofovir therapy today and continue throughout delivery. Suspected hemangioma on liver US, [facility] recommending " liver CT vs MRI postpartum.    3. Anemia in pregnancy  Hgb low at 9.5, MCV 83 with ferritin of 4 on oral supplement.     4. History of  delivery, currently pregnant  Prior low transverse c/s for twin gestation with transverse/transverse malpresentation. Desires TOLAC, 88% calculated percent success rate. Referred to Dr. Armand OGLESBY.    5. Uterine size date discrepancy pregnancy  Continues to measure slightly large for dates, follow up US showed normal interval growth, EFW 48%ile. EBER upper limit of normal at 24 cm.   -----------------------------------------    Precepted with: Dr. Flip Ojeda DO  [facility] Resident  Pager #[phone]

## 2020-02-13 ENCOUNTER — TRANSFERRED RECORDS (OUTPATIENT)
Dept: HEALTH INFORMATION MANAGEMENT | Facility: CLINIC | Age: 30
End: 2020-02-13

## 2020-02-13 LAB
ALLERGIC TO PENICILLIN: NO
GP B STREP AG SPEC QL LA: NEGATIVE

## 2020-02-18 NOTE — PROGRESS NOTES
SUBJECTIVE  No vaginal bleeding, loss of fluids, headaches, change in vision, or lower extremity edema. Baby is active. She has been taking her pre-fransico vitamin and oral iron. Intermittent contractions but nothing consistent.     Continues to meet with [facility] for chronic hep B. Sees them again in 3 weeks.    GBS negative last visit.     OBJECTIVE  BP 91/57   Pulse 80   Temp 97.5  F (36.4  C)   Resp 18   Wt 61.1 kg (134 lb 12.8 oz)   LMP 2019   SpO2 98%   BMI 27.23 kg/m      General: sitting up in a chair awake and alert. Pleasant and cooperative in NAD.  CV: acyanotic   Pulm: breathing comfortably, no increased WOB  Abd: gravid, non-tender, uterus palpated 37, FHTs 140  GYN: soft, 2-3/30/-  Extremities: soft, nontender     ASSESSMENT/PLAN     1. High-risk pregnancy in third trimester  Patient is a  a 29 year old  (twins 2nd pregnancy, delivered via c/s) female at 37w1d corresponding to JUDY of  Mar 11, 2020 obtained via LMP (consistent with 11 wk US), desires TOLAC. Expecting baby boy. Episode of vaginal vs hemorrhoidal bleeding at 32w1d with reassuring workup in L&D and no recurrence.   - Prenatal labs unremarkable (Blood type:O+, Hgb:10.9-->9.5, Syphilisx2, GC/Chlam, HIV negative, Rubella immune)  - Pregravid weight 119 lbs, TWG today 15 lbs  - Received flu vaccine 19  - Normal growth and development on fetal survey  - Failed 1 hr GTT, passed 3 hr  - Received TDAP 19  - OBGYN referral for TOLAC (Predicted chance of vaginal birth after :  88.0%)  - Plans on formula feeding  - Declines circumcision  - Wants to try for natural delivery  - GBS negative    2. Viral hepatitis B chronic (H)  Chronic hepatitis B with high viral load. [facility], on tenofovir through 1 week postpartum. Suspected hemangioma on liver US, [facility] recommending liver CT vs MRI postpartum. Baby will need appropriate prophylaxis after delivery. PCP will continue primary screening after delivery, GI  recommending liver panel q6months and Hep B viral load annually, with hepatoma screening beginning at age 45.    3. Anemia in pregnancy  Hgb low at 9.5, MCV 83 with ferritin of 4 on oral supplement. Hgb improved to 10.2 today.     4. History of  delivery, currently pregnant  Prior low transverse c/s for twin gestation with transverse/transverse malpresentation. Desires TOLAC, 88% calculated percent success rate. Referred to Dr. Armand OGLESBY.    5. Uterine size date discrepancy pregnancy  Had been measuring slightly large for dates, follow up US showed normal interval growth, EFW 48%ile. EBER upper limit of normal at 24 cm. Now measuring more appropriately, suspect 2/2 fetal descent.   -----------------------------------------    Precepted with: Dr. Hair Ojeda DO  [facility] Medicine Resident  Pager #[phone]

## 2020-02-20 ENCOUNTER — OFFICE VISIT (OUTPATIENT)
Dept: FAMILY MEDICINE | Facility: CLINIC | Age: 30
End: 2020-02-20
Payer: COMMERCIAL

## 2020-02-20 VITALS
BODY MASS INDEX: 27.23 KG/M2 | SYSTOLIC BLOOD PRESSURE: 91 MMHG | OXYGEN SATURATION: 98 % | TEMPERATURE: 97.5 F | DIASTOLIC BLOOD PRESSURE: 57 MMHG | WEIGHT: 134.8 LBS | RESPIRATION RATE: 18 BRPM | HEART RATE: 80 BPM

## 2020-02-20 DIAGNOSIS — O34.219 HISTORY OF CESAREAN DELIVERY, CURRENTLY PREGNANT: ICD-10-CM

## 2020-02-20 DIAGNOSIS — O99.013 ANEMIA DURING PREGNANCY IN THIRD TRIMESTER: ICD-10-CM

## 2020-02-20 DIAGNOSIS — B18.1 VIRAL HEPATITIS B CHRONIC (H): ICD-10-CM

## 2020-02-20 DIAGNOSIS — O09.93 HIGH-RISK PREGNANCY IN THIRD TRIMESTER: Primary | ICD-10-CM

## 2020-02-20 LAB — HEMOGLOBIN: 10.2 G/DL (ref 11.7–15.7)

## 2020-02-20 NOTE — PATIENT INSTRUCTIONS
[facility] Information  If you have any further concerns or wish to schedule another appointment, please call our office at [phone] during normal business hours (8-5, M-F).     For uncomplicated pregnancies, you will be seeing your doctor once a month until you are 28 weeks, then you will see your doctor twice a month. You will begin weekly visits at 36 weeks until you deliver.     If you have urgent medical questions that cannot wait, you may call [phone] at any time of day.     If you have a medical emergency, please call [phone].    When to call or come in to the hospital  If you notice a decrease in your baby's movement.   If your begin to experience contractions that are 5 minutes or less apart and lasting for over 45 seconds, or if contractions are becoming more painful.  If you have any bleeding or leakage of fluids.   If you have a headache not resolved with tylenol, right upper abdominal pain, or sudden onset of swelling.  You know your body best. Never hesitate to call or go to the hospital if something doesn't feel right!    Preparing for the hospital  You re likely feeling anxious as your child s birth approaches. This is normal. To give yourself some peace of mind, pack a bag 3-4 weeks before your due date. Here is a list of things to remember:  Personal care items like toothbrush, hair brush, lip balm, lotion, shampoo, glasses, contacts  Nightgown, bathrobe, slippers  Several pairs of underwear  Comfortable clothes for you to wear home  Camera with new batteries  Cell phone and   Insurance information and any other paperwork needed for your hospital stay  Clothes for baby to wear home  An infant, rear-facing car seat for bringing home your baby (this is required by law)

## 2020-02-28 ENCOUNTER — OFFICE VISIT (OUTPATIENT)
Dept: FAMILY MEDICINE | Facility: CLINIC | Age: 30
End: 2020-02-28
Payer: COMMERCIAL

## 2020-02-28 VITALS
OXYGEN SATURATION: 100 % | SYSTOLIC BLOOD PRESSURE: 93 MMHG | TEMPERATURE: 97.3 F | HEIGHT: 60 IN | HEART RATE: 94 BPM | BODY MASS INDEX: 27.09 KG/M2 | WEIGHT: 138 LBS | DIASTOLIC BLOOD PRESSURE: 61 MMHG | RESPIRATION RATE: 18 BRPM

## 2020-02-28 DIAGNOSIS — O09.93 HIGH-RISK PREGNANCY IN THIRD TRIMESTER: Primary | ICD-10-CM

## 2020-02-28 DIAGNOSIS — O26.843 UTERINE SIZE-DATE DISCREPANCY IN THIRD TRIMESTER: ICD-10-CM

## 2020-02-28 DIAGNOSIS — B18.1 VIRAL HEPATITIS B CHRONIC (H): ICD-10-CM

## 2020-02-28 ASSESSMENT — MIFFLIN-ST. JEOR: SCORE: 1271.84

## 2020-02-28 NOTE — PROGRESS NOTES
"SUBJECTIVE  No vaginal bleeding, loss of fluids, headaches, change in vision, or lower extremity edema. Baby is active. She has been taking her pre- vitamin and oral iron. Intermittent contractions but nothing consistent.     OBJECTIVE  BP 93/61   Pulse 94   Temp 97.3  F (36.3  C) (Oral)   Resp 18   Ht 1.515 m (4' 11.65\")   Wt 62.6 kg (138 lb)   LMP 2019   SpO2 100%   BMI 27.27 kg/m      General: sitting up in a chair awake and alert. Pleasant and cooperative in NAD.  CV: acyanotic   Pulm: breathing comfortably, no increased WOB  Abd: gravid, non-tender, uterus palpated 39, FHTs 135  GYN: soft, 2-3/30/-2, head ballotable   Extremities: soft, nontender     ASSESSMENT/PLAN     1. High-risk pregnancy in third trimester  Patient is a  a 29 year old  (twins 2nd pregnancy, delivered via c/s) female at 38w2d corresponding to JUDY of  Mar 11, 2020 obtained via LMP (consistent with 11 wk US), desires TOLAC. Expecting baby boy. Episode of vaginal vs hemorrhoidal bleeding at 38w4d with reassuring workup in L&D and no recurrence.   - Prenatal labs: (Blood type:O+, Hgb:10.9-->9.5, Syphilisx2, GC/Chlam, HIV negative, Rubella immune)  - Pregravid weight 119 lbs, TWG today 19 lbs  - Received flu vaccine 19  - Normal growth and development on fetal survey  - Failed 1 hr GTT, passed 3 hr  - Received TDAP 19  - OBGYN referral for TOLAC (Predicted chance of vaginal birth after :  88.0%)  - Plans on formula feeding  - Declines circumcision  - Wants to try for natural delivery  - GBS negative    2. Viral hepatitis B chronic (H)  Chronic hepatitis B with high viral load. [facility], on tenofovir through 1 week postpartum. Suspected hemangioma on liver US, [facility] recommending liver CT vs MRI postpartum. Baby will need appropriate prophylaxis after delivery. PCP will continue primary screening after delivery, GI recommending liver panel q6months and Hep B viral load annually, with " hepatoma screening beginning at age 45.    3. Anemia in pregnancy  Hgb low at 9.5, MCV 83 with ferritin of 4 on oral supplement. Hgb improved to 10.2.     4. History of  delivery, currently pregnant  Prior low transverse c/s for twin gestation with transverse/transverse malpresentation. Desires TOLAC, 88% calculated percent success rate. Referred to Dr. Armand OGLESBY.    5. Uterine size date discrepancy pregnancy  Has been measuring slightly large for dates, follow up US showed normal interval growth, EFW 48%ile. EBER upper limit of normal at 24 cm. Will obtain one last US to assess fluid, if EBER >24 or single deepest pocket >8 will get NST/BPP and discuss induction at 39 weeks.   - US OB > 14 Weeks; Future    -----------------------------------------    Precepted with: Dr. Lisa Ojeda DO  [facility] Resident  Pager #[phone]

## 2020-03-01 ENCOUNTER — HEALTH MAINTENANCE LETTER (OUTPATIENT)
Age: 30
End: 2020-03-01

## 2020-03-02 ENCOUNTER — RECORDS - HEALTHEAST (OUTPATIENT)
Dept: ADMINISTRATIVE | Facility: OTHER | Age: 30
End: 2020-03-02

## 2020-03-02 ENCOUNTER — HOSPITAL ENCOUNTER (OUTPATIENT)
Dept: ULTRASOUND IMAGING | Facility: HOSPITAL | Age: 30
Discharge: HOME | End: 2020-03-02
Attending: FAMILY MEDICINE

## 2020-03-02 ENCOUNTER — TRANSFERRED RECORDS (OUTPATIENT)
Dept: HEALTH INFORMATION MANAGEMENT | Facility: CLINIC | Age: 30
End: 2020-03-02

## 2020-03-02 ENCOUNTER — TELEPHONE (OUTPATIENT)
Dept: FAMILY MEDICINE | Facility: CLINIC | Age: 30
End: 2020-03-02

## 2020-03-02 DIAGNOSIS — O26.843 UTERINE SIZE-DATE DISCREPANCY IN THIRD TRIMESTER: ICD-10-CM

## 2020-03-02 NOTE — TELEPHONE ENCOUNTER
Called Yo to inform her of need for repeat BPP, to be done today. Will call [facility] scheduling to schedule appt for today per Dr Ojeda. Mick HERNANDEZ    BPP appointment info:  [facility] Radiology  Today, Monday 3/2/2020 @ 1:00pm  Patient to arrive by 12:45pm  Order has been faxed to scheduling [phone] and Yo informed verbally of appt information. Yo will follow up in clinic for routine OB visit 3/4/2020.  Mick HERNANDEZ

## 2020-03-02 NOTE — TELEPHONE ENCOUNTER
I have called, per [facility] medical record, I have faxed request to obtain TOLAC consultation note for visit 12/20/2019 with Dr Acuna. Mick HERNANDEZ

## 2020-03-03 DIAGNOSIS — O26.843 UTERINE SIZE-DATE DISCREPANCY IN THIRD TRIMESTER: ICD-10-CM

## 2020-03-03 DIAGNOSIS — O26.849 UTERINE SIZE DATE DISCREPANCY PREGNANCY: ICD-10-CM

## 2020-03-03 NOTE — PROGRESS NOTES
"SUBJECTIVE  No vaginal bleeding, loss of fluids, headaches, change in vision, or lower extremity edema. Baby is active. She has been taking her pre- vitamin and oral iron. Intermittent contractions but nothing consistent.     OBJECTIVE  BP (!) 83/55   Pulse 73   Temp 98.3  F (36.8  C)   Resp 19   Ht 1.499 m (4' 11\")   Wt 63.4 kg (139 lb 12.8 oz)   LMP 2019   SpO2 100%   BMI 28.24 kg/m      General: sitting up in a chair awake and alert. Pleasant and cooperative in NAD.  CV: acyanotic   Pulm: breathing comfortably, no increased WOB  Abd: gravid, non-tender, uterus palpated 39, FHTs 150  GYN: soft, 2-3/30/-2, head more engaged today  Extremities: soft, nontender     ASSESSMENT/PLAN     1. High-risk pregnancy in third trimester  Patient is a  a 29 year old  (twins 2nd pregnancy, delivered via c/s) female at 38w2d corresponding to JUDY of  Mar 11, 2020 obtained via LMP (consistent with 11 wk US), desires TOLAC. Expecting baby boy. Episode of vaginal vs hemorrhoidal bleeding at 39w0d with reassuring workup in L&D and no recurrence.   - Prenatal labs: (Blood type:O+, Hgb:10.9-->9.5, Syphilisx2, GC/Chlam, HIV negative, Rubella immune)  - Pregravid weight 119 lbs, TWG today 19 lbs  - Received flu vaccine 19  - Normal growth and development on fetal survey  - Failed 1 hr GTT, passed 3 hr  - Received TDAP 19  - OBGYN referral for TOLAC (Predicted chance of vaginal birth after :  88.0%)  - Plans on formula feeding  - Declines circumcision  - Wants to try for natural delivery  - GBS negative  - Considering depo for contraception    2. Viral hepatitis B chronic (H)  Chronic hepatitis B with high viral load. [facility], on tenofovir through 1 week postpartum. Suspected hemangioma on liver US, [facility] recommending liver CT vs MRI postpartum. Baby will need appropriate prophylaxis after delivery. PCP will continue primary screening after delivery, GI recommending liver panel " q6months and Hep B viral load annually, with hepatoma screening beginning at age 45.    3. Anemia in pregnancy  Hgb low at 9.5, MCV 83 with ferritin of 4 on oral supplement. Hgb improved to 10.2.     4. History of  delivery, currently pregnant  Prior low transverse c/s for twin gestation with transverse/transverse malpresentation. Desires TOLAC, 88% calculated percent success rate. Referred to Dr. Armand OGLESBY.    5. Uterine size date discrepancy pregnancy  Has been measuring slightly large for dates, follow up US showed normal interval growth, EFW 48%ile. EBER upper limit of normal at 24 cm. BPP obtained 3/2/20 and showed normal SDP 4.9 cm, score 8/8.  -----------------------------------------    Precepted with: Dr. Flip Ojeda DO  [facility] [place] [facility]  Pager #[phone]

## 2020-03-04 ENCOUNTER — OFFICE VISIT (OUTPATIENT)
Dept: FAMILY MEDICINE | Facility: CLINIC | Age: 30
End: 2020-03-04
Payer: COMMERCIAL

## 2020-03-04 VITALS
OXYGEN SATURATION: 100 % | HEART RATE: 73 BPM | BODY MASS INDEX: 28.18 KG/M2 | HEIGHT: 59 IN | WEIGHT: 139.8 LBS | RESPIRATION RATE: 19 BRPM | SYSTOLIC BLOOD PRESSURE: 83 MMHG | DIASTOLIC BLOOD PRESSURE: 55 MMHG | TEMPERATURE: 98.3 F

## 2020-03-04 DIAGNOSIS — O09.93 HIGH-RISK PREGNANCY IN THIRD TRIMESTER: Primary | ICD-10-CM

## 2020-03-04 DIAGNOSIS — O99.013 ANEMIA DURING PREGNANCY IN THIRD TRIMESTER: ICD-10-CM

## 2020-03-04 DIAGNOSIS — B18.1 VIRAL HEPATITIS B CHRONIC (H): ICD-10-CM

## 2020-03-04 ASSESSMENT — MIFFLIN-ST. JEOR: SCORE: 1269.76

## 2020-03-04 NOTE — PATIENT INSTRUCTIONS
Labor and delivery: [phone]    [facility] Information  If you have any further concerns or wish to schedule another appointment, please call our office at [phone] during normal business hours (8-5, M-F).     For uncomplicated pregnancies, you will be seeing your doctor once a month until you are 28 weeks, then you will see your doctor twice a month. You will begin weekly visits at 36 weeks until you deliver.     If you have urgent medical questions that cannot wait, you may call [phone] at any time of day.     If you have a medical emergency, please call [phone].    When to call or come in to the hospital  If you notice a decrease in your baby's movement.   If your begin to experience contractions that are 5 minutes or less apart and lasting for over 45 seconds, or if contractions are becoming more painful.  If you have any bleeding or leakage of fluids.   If you have a headache not resolved with tylenol, right upper abdominal pain, or sudden onset of swelling.  You know your body best. Never hesitate to call or go to the hospital if something doesn't feel right!    Preparing for the hospital  You re likely feeling anxious as your child s birth approaches. This is normal. To give yourself some peace of mind, pack a bag 3-4 weeks before your due date. Here is a list of things to remember:  Personal care items like toothbrush, hair brush, lip balm, lotion, shampoo, glasses, contacts  Nightgown, bathrobe, slippers  Several pairs of underwear  Comfortable clothes for you to wear home  Camera with new batteries  Cell phone and   Insurance information and any other paperwork needed for your hospital stay  Clothes for baby to wear home  An infant, rear-facing car seat for bringing home your baby (this is required by law)

## 2020-03-10 ENCOUNTER — MEDICAL CORRESPONDENCE (OUTPATIENT)
Dept: HEALTH INFORMATION MANAGEMENT | Facility: CLINIC | Age: 30
End: 2020-03-10

## 2020-03-13 ENCOUNTER — OFFICE VISIT (OUTPATIENT)
Dept: FAMILY MEDICINE | Facility: CLINIC | Age: 30
End: 2020-03-13
Payer: COMMERCIAL

## 2020-03-13 VITALS
TEMPERATURE: 97.6 F | WEIGHT: 141.2 LBS | OXYGEN SATURATION: 100 % | SYSTOLIC BLOOD PRESSURE: 92 MMHG | HEIGHT: 59 IN | DIASTOLIC BLOOD PRESSURE: 64 MMHG | HEART RATE: 78 BPM | RESPIRATION RATE: 20 BRPM | BODY MASS INDEX: 28.47 KG/M2

## 2020-03-13 DIAGNOSIS — B18.1 VIRAL HEPATITIS B CHRONIC (H): ICD-10-CM

## 2020-03-13 DIAGNOSIS — O09.93 HIGH-RISK PREGNANCY IN THIRD TRIMESTER: Primary | ICD-10-CM

## 2020-03-13 DIAGNOSIS — O99.013 ANEMIA DURING PREGNANCY IN THIRD TRIMESTER: ICD-10-CM

## 2020-03-13 ASSESSMENT — MIFFLIN-ST. JEOR: SCORE: 1283.23

## 2020-03-13 NOTE — PROGRESS NOTES
"SUBJECTIVE  No vaginal bleeding, loss of fluids, headaches, change in vision, or lower extremity edema. Baby is active. She has been taking her pre- vitamin and oral iron. Eager to have baby.     OBJECTIVE  BP 92/64   Pulse 78   Temp 97.6  F (36.4  C)   Resp 20   Ht 1.51 m (4' 11.45\")   Wt 64 kg (141 lb 3.2 oz)   LMP 2019   SpO2 100%   BMI 28.09 kg/m      General: sitting up in a chair awake and alert. Pleasant and cooperative in NAD.  CV: acyanotic   Pulm: breathing comfortably, no increased WOB  Abd: gravid, non-tender, uterus palpated 39, FHTs 150  GYN: soft, 3/30/-2, vertex on bedside US, uterus 41 cm  Extremities: soft, nontender     ASSESSMENT/PLAN     1. High-risk pregnancy in third trimester  Patient is a  a 29 year old  (twins 2nd pregnancy, delivered via c/s) female at 38w2d corresponding to JUDY of  Mar 11, 2020 obtained via LMP (consistent with 11 wk US), desires TOLAC. Expecting baby boy. Episode of vaginal vs hemorrhoidal bleeding at 39w0d with reassuring workup in L&D and no recurrence.   - Prenatal labs: (Blood type:O+, Hgb:10.9-->9.5, Syphilisx2, GC/Chlam, HIV negative, Rubella immune)  - Pregravid weight 119 lbs, TWG today 22 lbs  - Received flu vaccine 19  - Normal growth and development on fetal survey  - Failed 1 hr GTT, passed 3 hr  - Received TDAP 19  - OBGYN referral for TOLAC (Predicted chance of vaginal birth after :  88.0%)  - Plans on formula feeding  - Declines circumcision  - Wants to try for natural delivery  - GBS negative  - Considering depo for contraception  - Small change in cervical exam, will have patient return in 3 days. If no sponatneous labor at that time will schedule for low dose pitocin induction (perry 5 today) at 41 wks (3/18/20).     2. Viral hepatitis B chronic (H)  Chronic hepatitis B with high viral load. Following with [facility], on tenofovir through 1 week postpartum. Suspected hemangioma on liver US, [facility] recommending " liver CT vs MRI postpartum. Baby will need appropriate prophylaxis after delivery. PCP will continue primary screening after delivery, GI recommending liver panel q6months and Hep B viral load annually, with hepatoma screening beginning at age 45.    3. Anemia in pregnancy  Hgb low at 9.5, MCV 83 with ferritin of 4 on oral supplement. Hgb improved to 10.2 in 3rd trimester.     4. History of  delivery, currently pregnant  Prior low transverse c/s for twin gestation with transverse/transverse malpresentation. Desires TOLAC, 88% calculated percent success rate. Referred to Dr. Armand OGLESBY.    5. Uterine size date discrepancy pregnancy  Has been measuring slightly large for dates, follow up US showed normal interval growth, EFW 48%ile. EBER upper limit of normal at 24 cm. BPP obtained 3/2/20 and showed normal SDP 4.9 cm, score 8/8, EFW 73%ile.   -----------------------------------------    Precepted with: Dr. Jabari Ojeda DO  [facility]  Pager #[phone]

## 2020-03-16 ENCOUNTER — OFFICE VISIT (OUTPATIENT)
Dept: FAMILY MEDICINE | Facility: CLINIC | Age: 30
End: 2020-03-16
Payer: COMMERCIAL

## 2020-03-16 VITALS
BODY MASS INDEX: 28.47 KG/M2 | OXYGEN SATURATION: 100 % | HEIGHT: 59 IN | SYSTOLIC BLOOD PRESSURE: 96 MMHG | RESPIRATION RATE: 20 BRPM | HEART RATE: 78 BPM | DIASTOLIC BLOOD PRESSURE: 61 MMHG | WEIGHT: 141.2 LBS | TEMPERATURE: 97.5 F

## 2020-03-16 DIAGNOSIS — O99.013 ANEMIA DURING PREGNANCY IN THIRD TRIMESTER: ICD-10-CM

## 2020-03-16 DIAGNOSIS — B18.1 VIRAL HEPATITIS B CHRONIC (H): ICD-10-CM

## 2020-03-16 DIAGNOSIS — O09.93 HIGH-RISK PREGNANCY IN THIRD TRIMESTER: Primary | ICD-10-CM

## 2020-03-16 ASSESSMENT — MIFFLIN-ST. JEOR: SCORE: 1276.11

## 2020-03-16 NOTE — PROGRESS NOTES
"SUBJECTIVE  No vaginal bleeding, loss of fluids, headaches, change in vision, or lower extremity edema. Baby has been active, less large movements but frequent small ones. She has been taking her pre- vitamin and oral iron. She is having increasing difficulty with ambulation due to low back pain. Desires elective induction.     OBJECTIVE  BP 96/61   Pulse 78   Temp 97.5  F (36.4  C)   Resp 20   Ht 1.499 m (4' 11\")   Wt 64 kg (141 lb 3.2 oz)   LMP 2019   SpO2 100%   BMI 28.52 kg/m      General: sitting up in a chair awake and alert. Pleasant and cooperative in NAD.  CV: acyanotic   Pulm: breathing comfortably, no increased WOB  Abd: gravid, non-tender, uterus palpated 39, FHTs 150  GYN: soft, 3/50/-2, uterus 41 cm  Extremities: soft, nontender     ASSESSMENT/PLAN     1. High-risk pregnancy in third trimester  Patient is a  a 29 year old  (twins 2nd pregnancy, delivered via c/s) female at 40w5d corresponding to JUDY of  Mar 11, 2020 obtained via LMP (consistent with 11 wk US), desires TOLAC. Expecting baby boy. Episode of vaginal vs hemorrhoidal bleeding at 39w0d with reassuring workup in L&D and no recurrence.   - Prenatal labs: (Blood type:O+, Hgb:10.9-->9.5-->10.2, Syphilisx2, GC/Chlam, HIV negative, Rubella immune)  - Pregravid weight 119 lbs, TWG today 22 lbs  - Received flu vaccine 19  - Normal growth and development on fetal survey  - Failed 1 hr GTT, passed 3 hr  - Received TDAP 19  - OBGYN referral for TOLAC (Predicted chance of vaginal birth after :  88.0%)  - Plans on formula feeding  - Declines circumcision  - Wants to try for natural delivery  - GBS negative  - Considering depo for contraception  - Brown 6 today. Scheduled elective induction for tomorrow, 3/17/2020. Membranes stripped today.     2. Viral hepatitis B chronic (H)  Chronic hepatitis B with high viral load. Following [facility], on tenofovir through 1 week postpartum. Suspected hemangioma on liver " US, [facility] recommending liver CT vs MRI postpartum. Baby will need appropriate prophylaxis after delivery. PCP will continue primary screening after delivery, GI recommending liver panel q6months and Hep B viral load annually, with hepatoma screening beginning at age 45.    3. Anemia in pregnancy  Hgb low at 9.5, MCV 83 with ferritin of 4 on oral supplement. Hgb improved to 10.2 in 3rd trimester.     4. History of  delivery, currently pregnant  Prior low transverse c/s for twin gestation with transverse/transverse malpresentation. Desires TOLAC, 88% calculated percent success rate. Referred to Dr. Armand OGLESBY.    5. Uterine size date discrepancy pregnancy  Has been measuring slightly large for dates throughout pregnancy, follow up US showed normal interval growth, EFW 48%ile. EBER upper limit of normal at 24 cm. BPP obtained 3/2/20 and showed normal SDP 4.9 cm, score 8/8, EFW 73%ile.   -----------------------------------------    Precepted with: Dr. Hair Ojeda DO  [facility] Resident  Pager #[phone]

## 2020-03-16 NOTE — PATIENT INSTRUCTIONS
Call labor and delivery at 6 AM tomorrow morning, and plan to come to the hospital at 7:30 AM    [phone]

## 2020-04-12 ENCOUNTER — ANESTHESIA - HEALTHEAST (OUTPATIENT)
Dept: SURGERY | Facility: HOSPITAL | Age: 30
End: 2020-04-12

## 2020-04-12 ASSESSMENT — MIFFLIN-ST. JEOR
SCORE: 1169.95
SCORE: 1168.59

## 2020-04-13 ENCOUNTER — SURGERY - HEALTHEAST (OUTPATIENT)
Dept: SURGERY | Facility: HOSPITAL | Age: 30
End: 2020-04-13

## 2020-04-15 ENCOUNTER — TELEPHONE (OUTPATIENT)
Dept: FAMILY MEDICINE | Facility: CLINIC | Age: 30
End: 2020-04-15

## 2020-04-15 NOTE — TELEPHONE ENCOUNTER
I called to check up on the pt and help the pt setup a hospital follow up.  The pt stated that she was told to follow up with her surgeon first. Pt stated that she will follow up with her surgeon first and then give us a call to schedule a hospital follow up.  Pt stated that she did have a question. She stated that after her surgery, her nose and throat feels itchy, is that normal. I had asked our triage nurse about the pt concern.  They stated that since she did have some tubes place in her nose for incubation, it is normal. The pt should be concern if she is having breathing issue. The pt should monitor and see.  I informed the pt of this.  Pt stated that she will wait to see, if any issue to give the clinic a call.

## 2020-07-01 ENCOUNTER — RECORDS - HEALTHEAST (OUTPATIENT)
Dept: ADMINISTRATIVE | Facility: OTHER | Age: 30
End: 2020-07-01

## 2020-07-29 ENCOUNTER — HOSPITAL ENCOUNTER (OUTPATIENT)
Dept: MRI IMAGING | Facility: HOSPITAL | Age: 30
Discharge: HOME | End: 2020-07-29

## 2020-07-29 DIAGNOSIS — K76.9 LIVER LESION: ICD-10-CM

## 2020-07-31 ENCOUNTER — TRANSFERRED RECORDS (OUTPATIENT)
Dept: HEALTH INFORMATION MANAGEMENT | Facility: CLINIC | Age: 30
End: 2020-07-31

## 2020-09-24 ENCOUNTER — OFFICE VISIT (OUTPATIENT)
Dept: FAMILY MEDICINE | Facility: CLINIC | Age: 30
End: 2020-09-24
Payer: COMMERCIAL

## 2020-09-24 VITALS
OXYGEN SATURATION: 99 % | HEART RATE: 70 BPM | DIASTOLIC BLOOD PRESSURE: 52 MMHG | SYSTOLIC BLOOD PRESSURE: 84 MMHG | RESPIRATION RATE: 22 BRPM | HEIGHT: 59 IN | BODY MASS INDEX: 24.52 KG/M2 | WEIGHT: 121.6 LBS | TEMPERATURE: 98.3 F

## 2020-09-24 DIAGNOSIS — N91.2 AMENORRHEA: Primary | ICD-10-CM

## 2020-09-24 DIAGNOSIS — O09.92 HIGH-RISK PREGNANCY IN SECOND TRIMESTER: ICD-10-CM

## 2020-09-24 PROBLEM — O09.93 HIGH-RISK PREGNANCY IN THIRD TRIMESTER: Status: RESOLVED | Noted: 2019-12-30 | Resolved: 2020-09-24

## 2020-09-24 LAB — HCG UR QL: POSITIVE

## 2020-09-24 RX ORDER — PRENATAL VIT/IRON FUM/FOLIC AC 27MG-0.8MG
1 TABLET ORAL DAILY
Qty: 90 TABLET | Refills: 1 | Status: SHIPPED | OUTPATIENT
Start: 2020-09-24 | End: 2021-01-18

## 2020-09-24 ASSESSMENT — MIFFLIN-ST. JEOR: SCORE: 1182.2

## 2020-09-24 NOTE — PROGRESS NOTES
Preceptor Attestation:   Patient seen, evaluated and discussed with the resident. I have verified the content of the note, which accurately reflects my assessment of the patient and the plan of care.  Supervising Physician:Darby Gipson MD  [facility]

## 2020-09-24 NOTE — PROGRESS NOTES
"       HPI       Yo Aragon is a 30 year old female who presents for:    Urine pregnancy test  LMP: 2020 (unsure)  Home pregnancy test: positive around 6/15/2020  Pregnancy test performed on arrival today: yes     Planned pregnancy: no but desired  Father: same as other children  Vaginal bleeding: no  Nausea: no  Weight gain: yes  Other symptoms: no fatigue, muscle spasm, constipation, breast tenderness  Plans to follow with: [facility]  Taking prenatal vitamin: no  Reviewed medication list: yes     OB history:   followed by [facility], delivery 3/17/2020  Prior pregnancy/delivery/postpartum complications: no  Breastfeeding: no     Social history: Lives with  and 4 children  Tobacco use: no  ETOH: no  Illicit drug use: no    History of chronic Hep B, previously on Viread during last pregnancy, which patient reports was stopped in 2020 by hepatology.    Patient Active Problem List   Diagnosis     Anemia of pregnancy      delivery delivered     Viral hepatitis B chronic (H)       No current outpatient medications.       Allergies   Allergen Reactions     No Known Allergies             Review of Systems:   Complete 10-point ROS negative except as per HPI           Physical Exam:     Vitals:    20 0845   BP: (!) 84/52   Pulse: 70   Resp: 22   Temp: 98.3  F (36.8  C)   SpO2: 99%   Weight: 55.2 kg (121 lb 9.6 oz)   Height: 1.499 m (4' 11\")     Body mass index is 24.56 kg/m .    GENERAL: healthy, alert and no distress  NECK: no tenderness, no adenopathy, no asymmetry, no masses, no stiffness; thyroid- normal to palpation  RESP: lungs clear to auscultation - no rales, no rhonchi, no wheezes  CV: regular rates and rhythm, normal S1 S2, no S3 or S4 and no murmur, no click or rub  ABDOMEN: soft, no tenderness, fundal height palpable below the umbilicus  MS: extremities- no gross deformities noted, no edema    Office Visit on 2020   Component Date Value Ref Range Status     " Hemoglobin 2020 10.2* 11.7 - 15.7 g/dL Final       Results for orders placed or performed in visit on 20 (from the past 24 hour(s))   HCG Qualitative Urine (UPT)  ([facility])   Result Value Ref Range    HCG Qual Urine Positive (A) Negative       Assessment and Plan     Yo was seen today for pregnancy test and medication reconciliation.    Diagnoses and all orders for this visit:    Amenorrhea  -     HCG Qualitative Urine (UPT)  ([facility])    High-risk pregnancy in second trimester  -     Prenatal Vit-Fe Fumarate-FA (PRENATAL MULTIVITAMIN W/IRON) 27-0.8 MG tablet; Take 1 tablet by mouth daily  -     US OB > 14 Weeks; Future    31 yo now  at 17w5d based on LMP (unsure)  History of  twin delivery, then TOLAC 6 months ago  Pregnancy option reviewed: Desired pregnancy  Dating by LMP: 2020 (unsure)  Will be followed by PVC: yes  Recommended return visit to hepatology to restart Viread for chronic hepatitis B.  Early OB referral: No  Initial OB labs: At new OB appt  Prenatal vitamins: prescribed  Safe medications during pregnancy: reviewed  OB intake: will be done by RN's (Trey robledo)    Advised to call or return to clinic if severe cramping, abdominal pain or vaginal bleeding.  Eduation provided regarding nutrition, smoking, ETOH and drug use, prenatal vitamins and safe OTC medications during pregnancy.    Options for treatment and follow-up care were reviewed with the patient and/or guardian. Yo Aragon and/or guardian engaged in the decision making process and verbalized understanding of the options discussed and agreed with the final plan.  Precepted with Dr. Leonela Angeles MD  [facility] Resident  Pager [phone]

## 2020-10-14 ENCOUNTER — DOCUMENTATION ONLY (OUTPATIENT)
Dept: FAMILY MEDICINE | Facility: CLINIC | Age: 30
End: 2020-10-14

## 2020-10-14 DIAGNOSIS — O09.92 HIGH-RISK PREGNANCY IN SECOND TRIMESTER: Primary | ICD-10-CM

## 2020-10-14 NOTE — PROGRESS NOTES
Patient was not willing to come in early for US after discussion with call center yesterday r/t being at work. Will coordinate at [facility] while patient is in clinic tomorrow. Moses HERNANDEZ

## 2020-10-14 NOTE — PROGRESS NOTES
Patient scheduled to see me tomorrow for NOB visit.  Patient is due for fetal survey.  EDC 2/26/2021 based on 17w dating US.  Will order fetal survey and requests RN's assist with scheduling for as soon as tomorrow pending availability.    Diagnoses and all orders for this visit:    High-risk pregnancy in second trimester  -     US OB > 14 Weeks; Future      Codi Angeles MD  [facility] Resident

## 2020-10-14 NOTE — PROGRESS NOTES
Yo is a returning OB L4 (with previous twins in 2017)patient with a short interval pregnancy (last delivered on 2020). Yo has updated surgical history of appendectomy on 2020 in the setting of acute appendicitis. Yo has a history of twin gestation (her second pregnancy) delivered via . Her last delivery was via  at 40w6d. Yo has a history of hepatitis B and anemia during pregnancy. Yo is late to cares and reports an unsure LMP of 2020. Patient unable to schedule US before NOB appointment, plan to schedule patient for US at [facility] during NOB appointment tomorrow. Yo wishes to see a female provider only, she will follow with  for her pregnancy but will see  for her NOB in order for patient to be seen sooner.     Average Risk Category  No significant risk factors: No    At Risk Category (up to 3)  Teen pregnancy: No  Poor social situation: No  Domestic abuse: No  Financial difficulties: No  Smoker: No  H/O  deliver: No  H/O drug abuse: No  Non-English speaking: No  Advanced maternal age: No  GDM risks: No  Previous C/S: Yes  H/O PIH: No  H/O STIs: No  H/O mental health concerns: No  Onset care > 20 weeks: Yes  Other:     High Risk Category (4 or more At Risk or)  Diabetes/GDM: No  Multiple gestation: No  Chronic hypertension: No  Significant hx of asthma: No  Fetal demise > 20 weeks: No  Positive tox screen: No  Current mental health treatment: No  Other:     Risk: At Risk   Date determined: 10/14/2020    Moses HERNANDEZ

## 2020-10-15 ENCOUNTER — OFFICE VISIT (OUTPATIENT)
Dept: FAMILY MEDICINE | Facility: CLINIC | Age: 30
End: 2020-10-15
Payer: COMMERCIAL

## 2020-10-15 VITALS
RESPIRATION RATE: 16 BRPM | BODY MASS INDEX: 26.17 KG/M2 | TEMPERATURE: 98 F | HEART RATE: 95 BPM | SYSTOLIC BLOOD PRESSURE: 95 MMHG | OXYGEN SATURATION: 100 % | DIASTOLIC BLOOD PRESSURE: 62 MMHG | HEIGHT: 59 IN | WEIGHT: 129.8 LBS

## 2020-10-15 DIAGNOSIS — Z98.891 HISTORY OF VBAC: ICD-10-CM

## 2020-10-15 DIAGNOSIS — O09.899 SHORT INTERVAL BETWEEN PREGNANCIES AFFECTING PREGNANCY, ANTEPARTUM: ICD-10-CM

## 2020-10-15 DIAGNOSIS — B18.1 VIRAL HEPATITIS B CHRONIC (H): ICD-10-CM

## 2020-10-15 DIAGNOSIS — O09.92 HIGH-RISK PREGNANCY IN SECOND TRIMESTER: Primary | ICD-10-CM

## 2020-10-15 DIAGNOSIS — O99.012 ANEMIA DURING PREGNANCY IN SECOND TRIMESTER: ICD-10-CM

## 2020-10-15 LAB
ALBUMIN SERPL BCP-MCNC: 2.9 G/DL (ref 3.5–5)
ALP SERPL-CCNC: 66 U/L (ref 45–120)
ALT SERPL W/O P-5'-P-CCNC: 28 U/L (ref 0–45)
AST SERPL-CCNC: 30 U/L (ref 0–40)
BILIRUB DIRECT SERPL-MCNC: 0.1 MG/DL (ref 0–0.5)
BILIRUB SERPL-MCNC: 0.3 MG/DL (ref 0–1)
BILIRUBIN UR: NEGATIVE MG/DL
BLOOD UR: NEGATIVE MG/DL
GLUCOSE URINE: NEGATIVE
HCT VFR BLD AUTO: 31 % (ref 35–47)
HEMOGLOBIN: 9.4 G/DL (ref 11.7–15.7)
HIV 1+2 AB+HIV1 P24 AG SERPL QL IA: NEGATIVE
KETONES UR QL: NEGATIVE MG/DL
LEUKOCYTE ESTERASE UR: NEGATIVE
MCH RBC QN AUTO: 25.8 PG (ref 26.5–35)
MCHC RBC AUTO-ENTMCNC: 30.3 G/DL (ref 32–36)
MCV RBC AUTO: 85.2 FL (ref 78–100)
NITRITE UR QL STRIP: NEGATIVE MG/DL
PH UR STRIP: 6.5 [PH] (ref 4.5–8)
PLATELET # BLD AUTO: 227 K/UL (ref 150–450)
PROT SERPL-MCNC: 6.6 G/DL (ref 6–8)
PROTEIN UR: NEGATIVE MG/DL
RBC # BLD AUTO: 3.64 M/UL (ref 3.8–5.2)
SP GR UR STRIP: 1.01 (ref 1–1.03)
UROBILINOGEN UR STRIP-ACNC: NORMAL E.U./DL
WBC # BLD AUTO: 5.4 K/UL (ref 4–11)

## 2020-10-15 PROCEDURE — 36415 COLL VENOUS BLD VENIPUNCTURE: CPT | Performed by: STUDENT IN AN ORGANIZED HEALTH CARE EDUCATION/TRAINING PROGRAM

## 2020-10-15 PROCEDURE — 99207 PR PRENATAL VISIT: CPT | Mod: GC | Performed by: STUDENT IN AN ORGANIZED HEALTH CARE EDUCATION/TRAINING PROGRAM

## 2020-10-15 PROCEDURE — 81003 URINALYSIS AUTO W/O SCOPE: CPT | Performed by: STUDENT IN AN ORGANIZED HEALTH CARE EDUCATION/TRAINING PROGRAM

## 2020-10-15 PROCEDURE — 85027 COMPLETE CBC AUTOMATED: CPT | Performed by: STUDENT IN AN ORGANIZED HEALTH CARE EDUCATION/TRAINING PROGRAM

## 2020-10-15 RX ORDER — FERROUS SULFATE 325(65) MG
325 TABLET ORAL
Qty: 90 TABLET | Refills: 1 | Status: SHIPPED | OUTPATIENT
Start: 2020-10-15 | End: 2021-01-18

## 2020-10-15 ASSESSMENT — MIFFLIN-ST. JEOR: SCORE: 1223.4

## 2020-10-15 NOTE — Clinical Note
I referred to GI and OB.  Fetal survey ordered.  I requested clinic staff now schedule ISMAEL with you in 2-3 week (sooner than what was scheduled) due to anemia and would consider rechecking Hgb then.

## 2020-10-15 NOTE — PROGRESS NOTES
Scheduled patient for fetal survey at 4:15 on Tuesday 10/20/2020 at [facility]. Patient is allowed one visitor, must both be masked at all times. Patinet to drink 24oz clear liquid one hour prior to the appointment without voiding. Patient to be updated at appointment today. Moses HERNANDEZ

## 2020-10-15 NOTE — PROGRESS NOTES
Yo Aragon is a 30 year old  female who presents to clinic for a new OB visit.  Her last menstrual period was 2020 (unsure).    Estimated Date of Delivery: 2021 based on US performed at 17w6d.    Currently 20w6d based on second trimester US.  Fetal survey has been scheduled for next week.    She has not had bleeding since her LMP. She has not had any abdominal pain or cramping since her LMP.  She has had mild nausea with no vomiting and with normal appetite.  Weight gain has occurred.  This was an unplanned but desired pregnancy.     OTHER CONCERNS: None    Pre Term Labor Risk Assessment  Is the patient's age <18 or >40? No  Patint's BMI is Body mass index is 25.99 kg/m . Does patient have a BMI < 18.5? No  If previous pregnancy, was delivery within previous 6 months? Yes (was within 6 months of conception; last delivery  3/17/2020)  Have you ever delivered a baby prior to 37 weeks gestation? No  Did conception for this pregnancy occur via In Vitro Fertilization? No    Summary: Patient is high risk for  Labor (verify Problem List includes V23.9 and note risk factor(s) in the Comments)  The patient has the following risk factors for pre term labor:  Q3: Short inter pregnancy interval (last birth < 6 months prior to conception)  Q25: Scant prenatal care - no pre  care prior to 20 weeks or no visit within previous 6 weeks    Patient Active Problem List   Diagnosis     Anemia of pregnancy      delivery delivered     Viral hepatitis B chronic (H)     History of      High-risk pregnancy in second trimester     Short interval between pregnancies affecting pregnancy, antepartum       OB History    Para Term  AB Living   4 3 3 0 0 4   SAB TAB Ectopic Multiple Live Births   0 0 0 1 4      # Outcome Date GA Lbr Yonas/2nd Weight Sex Delivery Anes PTL Lv   4 Current            3 Term 20 40w6d  3.771 kg (8 lb 5 oz) M  [facility] N [facility]      Birth Comments:  Complications: Nuchal, tight,delivered through      Complications: Nuchal cord affecting delivery      Name: Jeramy Marc      Apgar1: 8  Apgar5: 9   2A Term 11/07/17 38w0d  2.807 kg (6 lb 3 oz) M CS-LTranv Spinal N MARTHA      Name: JASEN,GRACE GLOVER      Apgar1: 8  Apgar5: 9   2B Term 11/07/17 38w0d  2.67 kg (5 lb 14.2 oz) M CS-Unspec Spinal N MARTHA      Name: JASEN,MALE B-MT      Apgar1: 9  Apgar5: 9   1 Term 09/07/13        MARTHA       Past Medical History:   Diagnosis Date     High-risk pregnancy in third trimester 12/30/2019     Viral hepatitis B chronic (H)        Past Surgical History:   Procedure Laterality Date     APPENDECTOMY  04/13/2020       Current Outpatient Medications   Medication Sig Dispense Refill     Prenatal Vit-Fe Fumarate-FA (PRENATAL MULTIVITAMIN W/IRON) 27-0.8 MG tablet Take 1 tablet by mouth daily 90 tablet 1       Do you have a history of any of the following medical conditions?  Condition Yes/No   Hypertension No   Heart disease, mitral valve prolapse, rheumatic fever No   Asthma or another chronic lung disease No   An autoimmune disorder No   Kidney disease No   Frequent urinary tract infections No   Migraine headaches No   Stroke, loss of sensation/function, seizures, or other neuro problem No   Diabetes No   Thyroid problems or have you taken thyroid medication No   Hepatitis, liver disease, jaundice Yes   Blood clots, phlebitis, pulmonary embolism or varicose veins No   Excessive bleeding after surgery or dental work No   Heavy menstrual periods requiring treatment No   Anemia Yes   Blood transfusions No        Would you refuse a blood transfusion? No   Breast problems No   Abnormalities of the uterus No   Abnormal pap smear No   Have you been treated for an emotional disturbance? No   Have you been physically, sexually, or emotionally hurt by someone? No   Have you been in a major accident or suffered serious trauma? No   Have you had surgical procedures? Yes        Have you ever had any  complications from anesthesia? No   Have you ever been hospitalized for a nonsurgical reason? No   Notes for positives: Chronic hepatitis B, was followed by Hepatology and was on Viread during prior pregnancy.  History of anemia with prior pregnancy.  Surgical history: appendectomy 4/2020, CS 2017.    Prenatal Genetic Screening  Do you have a history of any of the following Yes/No        A metabolic disorder (e.g. Insulin-dependent DM, PKU) No        Recurrent pregnancy loss No     Do you, the baby's father, or anyone in your families have Yes/No        Thalassemia AND MCV <80 No        Hemophilia No        Neural tube defect No        Congenital heart defect No        Sickle cell disease or trait No        Muscular dystrophy No        Cystic fibrosis No        Mental retardation or autism No        Down's syndrome No        Javed-Sach's disease No        Channahon's chorea No        Any other inherited genetic or chromosomal disorder No        A child with birth defects not listed above No     Infection History  At high risk for coming in contact with HIV No   Ever treated for tuberculosis No   Ever received the BCG vaccine for tuberculosis No   Ever had genital herpes (or has your partner) No   Had a rash or viral illness since LMP No   Ever had a sexually transmitted infection No   Ever had chicken pox or the vaccine Unknown   Ever had any other serious infectious disease No   Up to date with immunizations Yes   STI History No      PERSONAL/SOCIAL HISTORY  Lives with  and 4 children  Tobacco use: no  ETOH use: no  Illicit drug use: no  Have you used any tobacco products, alcohol or any other drugs during this pregnancy before or after your knew you were pregnant? No    REVIEW OF SYSTEMS  C: NEGATIVE for fever, chills, change in weight  E: NEGATIVE for vision changes or irritation  ENT: NEGATIVE for ear, mouth and throat problems  R: NEGATIVE for significant cough or SOB  B: NEGATIVE for masses, tenderness  "or discharge  CV: NEGATIVE for chest pain, palpitations or peripheral edema  GI: NEGATIVE for nausea, abdominal pain, heartburn, or change in bowel habits  : NEGATIVE for unusual urinary or vaginal symptoms.   M: NEGATIVE for significant arthralgias or myalgia  N: NEGATIVE for weakness, dizziness or paresthesias  P: NEGATIVE for changes in mood or affect    PHYSICAL EXAM:  BP 95/62 (BP Location: Right arm, Cuff Size: Adult Regular)   Pulse 95   Temp 98  F (36.7  C) (Oral)   Resp 16   Ht 1.505 m (4' 11.25\")   Wt 58.9 kg (129 lb 12.8 oz)   LMP 2020   SpO2 100%   BMI 25.99 kg/m     GENERAL:  Pleasant pregnant female, alert, well groomed.  SKIN:  Warm and dry, without lesions or rashes  EYES:  EOM intact, sclera white   NECK:  Thyroid without enlargement and nodules.  No cervical lymphadenopathy.  LUNGS:  Clear to auscultation.  HEART:  RRR without murmur.  ABDOMEN: Soft without tenderness or organomegaly.  Fundus palpable above the umbilicus, fundal height 23 cm.  bpm  MUSCULOSKELETAL:  Full range of motion.  EXTREMITIES:  No edema. No significant varicosities.     ASSESSMENT/PLAN  Patient Active Problem List   Diagnosis     Anemia of pregnancy      delivery delivered     Viral hepatitis B chronic (H)     History of      High-risk pregnancy in second trimester     Short interval between pregnancies affecting pregnancy, antepartum      (twin delivery via  ) at 20w6d based on second trimester US.  Routine prenatal labs today including CBC, type and screen, rubella, HIV, gonorrhea/chlamydia, RPR, hepatitis B, urinalysis with culture.  Hepatitis labs today due to history of chronic hepatitis B.  Pap smear up to date, next pap due 2022.  Fetal survey already ordered and is scheduled.    Referral(s):     1) Hepatology for management of chronic hepatitis B during pregnancy  Followed by Hepatology during recent prior pregnancy, was on Viread which was stopped at the end " of pregnancy  Repeat hepatitis labs today  Needs to re-establish care with Hepatology as soon as possible, and this was discussed with the patient    2) OB-Gyn for  counseling   Previous   with twin gestation  History of successful  3/2020  Patient is appropriate for repeat   Close interval pregnancy    Discussed:  -recommended weight gain of 15-25 lbs for overweight BMI 25.0-29.9  -Influenza vaccine discussed and declined.  -genetic screening options: discussed quad screen which is recommended to be done at 15-18 weeks, and patient is currently 20w6d.  Patient declines testing.  -safe medications during pregnancy. Will start taking prenatal vitamin daily as prescribed at last visit.  The importance of this was discussed with the patient, particularly for folic acid and iron supplementation in the prenatal vitamin.  -healthy habits including not using tobacco or alcohol, exercising regularly and maintaining healthy diet  -information given on tips for dealing with nausea, healthy habits, exposures, safety, prenatal appointment schedule, and when to call the doctor.  -plans to formula feed    Emphasized at the end of the visit the need for the patient to start taking the prenatal vitamin and to re-establish with Hepatology as soon as possible, in addition to fetal survey which has been scheduled for next week.  Routine follow up to be scheduled for pre- care with Dr. Aguilar who will follow the patient during her pregnancy.    CBC resulted after visit significant for normocytic anemia (Hgb 9.4, MCV 85).  Patient has a history of iron-deficiency anemia during recent pregnancy (ferritin 4 in 2019).  Oral iron prescription sent to pharmacy (see result note).  Due to anemia affecting second trimester of pregnancy, I have requested clinic staff schedule ISMAEL with Dr. Aguilar in 2-3 weeks at which time Hgb should be rechecked.    Precepted with Dr. Juan C Licea.  Codi Angeles MD  [facility]  Family Medicine Resident

## 2020-10-16 ENCOUNTER — TELEPHONE (OUTPATIENT)
Dept: FAMILY MEDICINE | Facility: CLINIC | Age: 30
End: 2020-10-16

## 2020-10-16 LAB
ABO + RH BLD: NORMAL
BLD GP AB SCN SERPL QL: NEGATIVE
C TRACH RRNA SPEC QL NAA+PROBE: NEGATIVE
CULTURE: NORMAL
HBV SURFACE AB SER-ACNC: NEGATIVE M[IU]/ML
HBV SURFACE AG SERPL QL IA: ABNORMAL
N GONORRHOEA RRNA SPEC QL NAA+PROBE: NEGATIVE
REPEAT ABO/RH TYPING (HML): NORMAL
RUBV IGG SERPL QL IA: POSITIVE
TREPONEMA ANTIBODY (SYPHILIS): NEGATIVE

## 2020-10-16 NOTE — PROGRESS NOTES
I have reviewed the history and physical examination. I was present for key portions of the visit and agree with the assessment and plan as documented above by Dr. Angeles for 30 yr old  @ 20w6d here for prenatal care. FHT/ FH appropriate.  Current issues include 1) chronic Hep B -> referral for hepatology 2) desires  -> referral to OB for counseling.  /term labor precautions given.  Continue routine prenatal care.     Juan C Licea MD  2020  1:55 PM

## 2020-10-16 NOTE — TELEPHONE ENCOUNTER
[facility] Family Medicine phone call message- general phone call:    Reason for call: Pt returning call for Moraima. She stated best time to get a hold of pt is after 3. No notes on what call is for.    Return call needed: Yes    OK to leave a message on voice mail? Yes    Primary language: English      needed? No    Call taken on October 16, 2020 at 3:21 PM by Janel Nguyen

## 2020-10-16 NOTE — NURSING NOTE
Referral faxed to [facility]. Will fax records once hepB viral load results are available. Moses HERNANDEZ

## 2020-10-17 LAB
HEPATITIS BE ABY: NEGATIVE
HEPATITIS BE AGN: POSITIVE

## 2020-10-19 NOTE — TELEPHONE ENCOUNTER
See lab results for documentation from [facility]. Pt was given results and appointment scheduled for 10/28.

## 2020-10-19 NOTE — RESULT ENCOUNTER NOTE
Hepatitis labs significant for POSITIVE Hepatitis Be Agn and Hepatitis B surface Ag, unchanged compared to prior labs 1 year ago during prior pregnancy.  Hepatitis B DNA pending at this time; during prior pregnancy was noted to have high copy number and was managed by [facility], on anti-viral at that time.    Referral to GI was sent at [facility] visit 10/15/2020, and our clinic RN's will fax lab results with referral.  Patient should be seen as soon as possible by [facility] given 21w gestation.  Patient has follow-up at our clinic scheduled with OB provider Dr. Aguilar in 1 week.    Codi Angeles MD  [facility] Medicine Resident

## 2020-10-20 LAB
HEP B VIRUS DNA QUANT IU/ML: ABNORMAL [IU]/ML
HEP B VIRUS DNA QUANT LOG IU/ML: >8.2 {LOG_IU}/ML

## 2020-10-22 ENCOUNTER — TRANSFERRED RECORDS (OUTPATIENT)
Dept: HEALTH INFORMATION MANAGEMENT | Facility: CLINIC | Age: 30
End: 2020-10-22

## 2020-10-28 ENCOUNTER — OFFICE VISIT (OUTPATIENT)
Dept: FAMILY MEDICINE | Facility: CLINIC | Age: 30
End: 2020-10-28
Payer: COMMERCIAL

## 2020-10-28 VITALS
BODY MASS INDEX: 26.89 KG/M2 | SYSTOLIC BLOOD PRESSURE: 79 MMHG | OXYGEN SATURATION: 100 % | RESPIRATION RATE: 18 BRPM | HEART RATE: 75 BPM | DIASTOLIC BLOOD PRESSURE: 46 MMHG | TEMPERATURE: 97.8 F | HEIGHT: 59 IN | WEIGHT: 133.4 LBS

## 2020-10-28 DIAGNOSIS — O99.012 ANEMIA DURING PREGNANCY IN SECOND TRIMESTER: ICD-10-CM

## 2020-10-28 DIAGNOSIS — B18.1 VIRAL HEPATITIS B CHRONIC (H): ICD-10-CM

## 2020-10-28 DIAGNOSIS — O09.899 SHORT INTERVAL BETWEEN PREGNANCIES AFFECTING PREGNANCY, ANTEPARTUM: ICD-10-CM

## 2020-10-28 DIAGNOSIS — O09.92 HIGH-RISK PREGNANCY IN SECOND TRIMESTER: Primary | ICD-10-CM

## 2020-10-28 DIAGNOSIS — Z98.891 HISTORY OF VBAC: ICD-10-CM

## 2020-10-28 LAB — HEMOGLOBIN: 10 G/DL (ref 11.7–15.7)

## 2020-10-28 PROCEDURE — 99207 PR PRENATAL VISIT: CPT | Mod: GC | Performed by: STUDENT IN AN ORGANIZED HEALTH CARE EDUCATION/TRAINING PROGRAM

## 2020-10-28 PROCEDURE — 85018 HEMOGLOBIN: CPT | Performed by: STUDENT IN AN ORGANIZED HEALTH CARE EDUCATION/TRAINING PROGRAM

## 2020-10-28 PROCEDURE — 36415 COLL VENOUS BLD VENIPUNCTURE: CPT | Performed by: STUDENT IN AN ORGANIZED HEALTH CARE EDUCATION/TRAINING PROGRAM

## 2020-10-28 ASSESSMENT — MIFFLIN-ST. JEOR: SCORE: 1242.85

## 2020-10-28 NOTE — PROGRESS NOTES
HPI:       Yo Aragon is a 30 year old  female who presents to clinic for OB visit.  Her last menstrual period was 2020 (unsure).     Estimated Date of Delivery: 2021 based on US performed at 17w6d.    Currently 22w5d based on second trimester US.  Fetal survey has been scheduled for next week.     Significant prenatal history:  -Hepatology for management of chronic hepatitis B during pregnancy: Followed by Hepatology during recent prior pregnancy, was on Viread which was stopped at the end of pregnancy. Needs to re-estalish care with Hepatology   -Pap smear up to date, next pap due 2022    Chief Complaint   Patient presents with     Prenatal Care     HGB CHeck     Medication Reconciliation     Completed        She has not had bleeding since her LMP . She has not had any abdominal pain or cramping since her LMP.  She has had mild nausea with no vomiting and with normal appetite.  Weight gain has occurred.  This was an unplanned but desired pregnancy.       Medication Reconciliation completed               Review of Systems:     C: NEGATIVE for fatigue, unexpected change in weight  E: NEGATIVE for acute vision problems or changes  R: NEGATIVE for significant cough or shortness of breath  CV: NEGATIVE for chest pain, palpitations or new or worsening peripheral edema  P: NEGATIVE for changes in mood or affect       Allergies   Allergen Reactions     No Known Allergies             PMHX:     Patient Active Problem List   Diagnosis     Anemia of pregnancy      delivery delivered     Viral hepatitis B chronic (H)     History of      High-risk pregnancy in second trimester     Short interval between pregnancies affecting pregnancy, antepartum       OB History    Para Term  AB Living   4 3 3 0 0 4   SAB TAB Ectopic Multiple Live Births   0 0 0 1 4      # Outcome Date GA Lbr Yonas/2nd Weight Sex Delivery Anes PTL Lv   4 Current            3 Term 20 40w6d  3.771 kg (8 lb  5 oz) M  [facility] N [facility]      Birth Comments: Complications: Nuchal, tight,delivered through      Complications: Nuchal cord affecting delivery      Name: Jeramy Marc      Apgar1: 8  Apgar5: 9   2A Term 17 38w0d  2.807 kg (6 lb 3 oz) M CS-LTranv Spinal N MARTHA      Name: JASEN,MALE ADALBERTO      Apgar1: 8  Apgar5: 9   2B Term 17 38w0d  2.67 kg (5 lb 14.2 oz) M CS-Unspec Spinal N MARTHA      Name: JASEN,MALE GUNNER      Apgar1: 9  Apgar5: 9   1 Term 13        MARTHA       Past Medical History:   Diagnosis Date     High-risk pregnancy in third trimester 2019     Viral hepatitis B chronic (H)        Current Outpatient Medications   Medication Sig Dispense Refill     ferrous sulfate (FEROSUL) 325 (65 Fe) MG tablet Take 1 tablet (325 mg) by mouth daily (with breakfast) 90 tablet 1     Prenatal Vit-Fe Fumarate-FA (PRENATAL MULTIVITAMIN W/IRON) 27-0.8 MG tablet Take 1 tablet by mouth daily 90 tablet 1       No results found for this or any previous visit (from the past 24 hour(s)).          Past Surgical HX:              Social HX:     Social History     Socioeconomic History     Marital status: Single     Spouse name: Not on file     Number of children: Not on file     Years of education: Not on file     Highest education level: Not on file   Occupational History     Not on file   Social Needs     Financial resource strain: Not on file     Food insecurity     Worry: Not on file     Inability: Not on file     Transportation needs     Medical: Not on file     Non-medical: Not on file   Tobacco Use     Smoking status: Passive Smoke Exposure - Never Smoker     Smokeless tobacco: Never Used     Tobacco comment: vapor exposure    Substance and Sexual Activity     Alcohol use: Not Currently     Frequency: Never     Drug use: Never     Sexual activity: Not on file   Lifestyle     Physical activity     Days per week: Not on file     Minutes per session: Not on file     Stress: Not on file   Relationships      "Social connections     Talks on phone: Not on file     Gets together: Not on file     Attends Orthodox service: Not on file     Active member of club or organization: Not on file     Attends meetings of clubs or organizations: Not on file     Relationship status: Not on file     Intimate partner violence     Fear of current or ex partner: Not on file     Emotionally abused: Not on file     Physically abused: Not on file     Forced sexual activity: Not on file   Other Topics Concern     Not on file   Social History Narrative     Not on file     Have you used any tobacco products, alcohol or any other drugs during this pregnancy before or after your knew you were pregnant? no         Physical Exam:     Vitals:    10/28/20 1622 10/28/20 1626   BP: (!) 82/53 (!) 79/46   BP Location: Right arm Right arm   Patient Position: Sitting Sitting   Cuff Size: Adult Regular Adult Regular   Pulse: 75    Resp: 18    Temp: 97.8  F (36.6  C)    TempSrc: Oral    SpO2: 100%    Weight: 60.5 kg (133 lb 6.4 oz)    Height: 1.51 m (4' 11.45\")      Body mass index is 26.54 kg/m .    BP (!) 79/46 (BP Location: Right arm, Patient Position: Sitting, Cuff Size: Adult Regular)   Pulse 75   Temp 97.8  F (36.6  C) (Oral)   Resp 18   Ht 1.51 m (4' 11.45\")   Wt 60.5 kg (133 lb 6.4 oz)   LMP 2020   SpO2 100%   BMI 26.54 kg/m     GENERAL:  Pleasant pregnant female, alert, well groomed.  SKIN:  Warm and dry, without lesions or rashes   LUNGS:  Clear to auscultation.   HEART:  RRR without murmur.   ABDOMEN: Soft without masses , tenderness or organomegaly.  No CVA tenderness. Well healed scar from  section. Fundus palpable above symphysis pubis. FHT 140s  MUSCULOSKELETAL:  Full range of motion.   EXTREMITIES:  No edema. No significant varicosities.       Prenatal labs:   -   Hemoglobin   Date Value Ref Range Status   10/15/2020 9.4 (L) 11.7 - 15.7 g/dL Final         Assessment and Plan     (O09.92) High-risk pregnancy in second " trimester  (primary encounter diagnosis)  (B18.1) Viral hepatitis B chronic (H)  (Z98.891) History of   (O09.899) Short interval between pregnancies affecting pregnancy, antepartum   (O99.012) Anemia during pregnancy in second trimester    Yo Aragon is a 30 year old  female who presents to clinic for second OB visit.  Her last menstrual period was 2020 (unsure). Estimated Date of Delivery: 2021 based on US performed at 17w6d. Currently 22w5d based on second trimester. Close interval pregnancy. Previous  2017 with twin gestation. History of successful  3/2020 No acute concerns today.      Plan:  -Patient is appropriate for repeat   -Our clinic will follow-up with GI again to ensure they call the patient schedule a follow-up (referrals ordered on 10/15/2020)  -OB-Gyn for  counseling (referrals ordered on 10/15/2020)  -1-hr GTT at the next visit  -Provided counseling regarding  labor symptoms, depression and social support systems, breast feeding, contraception options after delivery. The patient plans to deliver at [facility] with myself and OB partner TBD.   -Patient will continue taking prenatal vitamins and avoiding cigarettes & alcohol.   -Return to clinic in 2 weeks    Options for treatment and follow-up care were reviewed with the patient and/or guardian. Pt and/or guardian engaged in the decision making process and verbalized understanding of the options discussed and agreed with the final plan.    Precepted today with: MD Mich Day MD, MPH (PGY 3)  [facility] Family Medicine Resident  Pager: [phone]

## 2020-10-29 NOTE — PROGRESS NOTES
I have reviewed the history and physical examination. I was present for key portions of the visit and agree with the assessment and plan as documented above by Dr. Aguilar for 30 yr old  @ 17 6d here for prenatal care. FHT/ FH appropriate.  Current issues include 1) chronic Hep B - needs to see GI. 2) desires .  /term labor precautions given.  Continue routine prenatal care.     Juan C Licea MD  2020  12:39 PM

## 2020-11-03 ENCOUNTER — HOSPITAL ENCOUNTER (OUTPATIENT)
Dept: ULTRASOUND IMAGING | Facility: HOSPITAL | Age: 30
Discharge: HOME | End: 2020-11-03

## 2020-11-03 DIAGNOSIS — Z36.89 ENCOUNTER FOR FETAL ANATOMIC SURVEY: ICD-10-CM

## 2020-11-13 ENCOUNTER — OFFICE VISIT (OUTPATIENT)
Dept: FAMILY MEDICINE | Facility: CLINIC | Age: 30
End: 2020-11-13
Payer: COMMERCIAL

## 2020-11-13 DIAGNOSIS — O09.899 SHORT INTERVAL BETWEEN PREGNANCIES AFFECTING PREGNANCY, ANTEPARTUM: ICD-10-CM

## 2020-11-13 DIAGNOSIS — B18.1 VIRAL HEPATITIS B CHRONIC (H): ICD-10-CM

## 2020-11-13 DIAGNOSIS — O99.012 ANEMIA DURING PREGNANCY IN SECOND TRIMESTER: ICD-10-CM

## 2020-11-13 DIAGNOSIS — Z98.891 HISTORY OF VBAC: ICD-10-CM

## 2020-11-13 DIAGNOSIS — O09.92 HIGH-RISK PREGNANCY IN SECOND TRIMESTER: Primary | ICD-10-CM

## 2020-11-13 PROCEDURE — 99207 PR PRENATAL VISIT: CPT | Mod: GC | Performed by: STUDENT IN AN ORGANIZED HEALTH CARE EDUCATION/TRAINING PROGRAM

## 2020-11-13 NOTE — PROGRESS NOTES
HPI:           Yo Aragon is a 30 year old  female who presents to clinic for OB visit.  Her last menstrual period was 2020 (unsure). Estimated Date of Delivery: 2021 based on US performed at 17w6d.    Currently 25w0d based on second trimester US. Fetal survey has been scheduled for next week.     Significant prenatal history:  -Hepatology for management of chronic hepatitis B during pregnancy: Followed by Hepatology during recent prior pregnancy, was on Viread which was stopped at the end of pregnancy. Needs to re-estalish care with Hepatology   -Pap smear up to date, next pap due 2022    Patient presents with:  Prenatal Care: 25 weeks. 1 hour glucose due  Medication Reconciliation: unable to review      She has not had bleeding since her LMP . She has not had any abdominal pain or cramping since her LMP.  She has had mild nausea with no vomiting and with normal appetite.  Weight gain has occurred.  This was an unplanned but desired pregnancy.     Flu shot given 2020    Medication Reconciliation completed             Review of Systems:     C: NEGATIVE for fatigue, unexpected change in weight  E: NEGATIVE for acute vision problems or changes  R: NEGATIVE for significant cough or shortness of breath  CV: NEGATIVE for chest pain, palpitations or new or worsening peripheral edema  P: NEGATIVE for changes in mood or affect       Allergies   Allergen Reactions     No Known Allergies             PMHX:     Patient Active Problem List   Diagnosis     Anemia of pregnancy      delivery delivered     Viral hepatitis B chronic (H)     History of      High-risk pregnancy in second trimester     Short interval between pregnancies affecting pregnancy, antepartum       OB History    Para Term  AB Living   4 3 3 0 0 4   SAB TAB Ectopic Multiple Live Births   0 0 0 1 4      # Outcome Date GA Lbr Yonas/2nd Weight Sex Delivery Anes PTL Lv   4 Current            3 Term 20  40w6d  3.771 kg (8 lb 5 oz) M  [facility]      Birth Comments: Complications: Nuchal, tight,delivered through      Complications: Nuchal cord affecting delivery      Name: Jeramy Marc      Apgar1: 8  Apgar5: 9   2A Term 17 38w0d  2.807 kg (6 lb 3 oz) M CS-LTranv Spinal N MARTHA      Name: JASEN,MALE ADALBERTO      Apgar1: 8  Apgar5: 9   2B Term 17 38w0d  2.67 kg (5 lb 14.2 oz) M CS-Unspec Spinal N MARTHA      Name: JASEN,MALE BNaderMT      Apgar1: 9  Apgar5: 9   1 Term 13        MARTHA       Past Medical History:   Diagnosis Date     High-risk pregnancy in third trimester 2019     Viral hepatitis B chronic (H)        Current Outpatient Medications   Medication Sig Dispense Refill     ferrous sulfate (FEROSUL) 325 (65 Fe) MG tablet Take 1 tablet (325 mg) by mouth daily (with breakfast) 90 tablet 1     Prenatal Vit-Fe Fumarate-FA (PRENATAL MULTIVITAMIN W/IRON) 27-0.8 MG tablet Take 1 tablet by mouth daily 90 tablet 1       No results found for this or any previous visit (from the past 24 hour(s)).          Past Surgical HX:              Social HX:     Social History     Socioeconomic History     Marital status: Single     Spouse name: Not on file     Number of children: Not on file     Years of education: Not on file     Highest education level: Not on file   Occupational History     Not on file   Social Needs     Financial resource strain: Not on file     Food insecurity     Worry: Not on file     Inability: Not on file     Transportation needs     Medical: Not on file     Non-medical: Not on file   Tobacco Use     Smoking status: Passive Smoke Exposure - Never Smoker     Smokeless tobacco: Never Used     Tobacco comment: vapor exposure    Substance and Sexual Activity     Alcohol use: Not Currently     Frequency: Never     Drug use: Never     Sexual activity: Not on file   Lifestyle     Physical activity     Days per week: Not on file     Minutes per session: Not on file     Stress: Not on file  "  Relationships     Social connections     Talks on phone: Not on file     Gets together: Not on file     Attends Nondenominational service: Not on file     Active member of club or organization: Not on file     Attends meetings of clubs or organizations: Not on file     Relationship status: Not on file     Intimate partner violence     Fear of current or ex partner: Not on file     Emotionally abused: Not on file     Physically abused: Not on file     Forced sexual activity: Not on file   Other Topics Concern     Not on file   Social History Narrative     Not on file     Have you used any tobacco products, alcohol or any other drugs during this pregnancy before or after your knew you were pregnant? non         Physical Exam:     Vitals:    20 0803   BP: 97/60   BP Location: Right arm   Patient Position: Sitting   Cuff Size: Adult Regular   Resp: 20   SpO2: 98%   Weight: 60.3 kg (133 lb)   Height: 1.52 m (4' 11.84\")     Body mass index is 26.11 kg/m .     HT: 152 cm  Wt: 133 lbs  Temp: 98  RR: 20    BP 97/60 (BP Location: Right arm, Patient Position: Sitting, Cuff Size: Adult Regular)   Resp 20   Ht 1.52 m (4' 11.84\")   Wt 60.3 kg (133 lb)   LMP 2020   SpO2 98%   BMI 26.11 kg/m     GENERAL:  Pleasant pregnant female, alert, well groomed.  SKIN:  Warm and dry, without lesions or rashes   LUNGS:  Clear to auscultation.   BREAST:  Symmetrical. No masses noted. No skin or nipple changes or axillary nodes.   HEART:  RRR without murmur.   ABDOMEN: Soft without masses , tenderness or organomegaly.  No CVA tenderness. Well healed scar from  section. Fundus palpable at symphysis pubis 25cm. FHT 140s  MUSCULOSKELETAL:  Full range of motion.   EXTREMITIES:  No edema. No significant varicosities.       Prenatal labs:   -   Hemoglobin   Date Value Ref Range Status   10/28/2020 10.0 (L) 11.7 - 15.7 g/dL Final         Assessment and Plan     (O09.92) High-risk pregnancy in second trimester  (primary encounter " diagnosis)  (B18.1) Viral hepatitis B chronic (H)  (Z98.891) History of   (O09.899) Short interval between pregnancies affecting pregnancy, antepartum   (O99.012) Anemia during pregnancy in second trimester     Yo Aragon is a 30 year old  female who presents to clinic for second OB visit.  Her last menstrual period was 2020 (unsure). Estimated Date of Delivery: 2021 based on US performed at 17w6d. Currently 22w5d based on second trimester. Close interval pregnancy. Previous  2017 with twin gestation. History of successful  3/2020 No acute concerns today.        Plan:  -1-hour GTT done today  -Patient is appropriate for repeat   -Our clinic will follow-up with GI again to ensure they call the patient schedule a follow-up (referrals ordered on 10/15/2020)  -OB-Gyn for  counseling (referrals ordered on 10/15/2020)  -1-hr GTT at the next visit  -Provided counseling regarding  labor symptoms, depression and social support systems, breast feeding, contraception options after delivery. The patient plans to deliver at [facility] with myself and OB partner TBD.   -Patient will continue taking prenatal vitamins and avoiding cigarettes & alcohol.   -Follow up in 4 weeks for routine prenatal visit.     Options for treatment and follow-up care were reviewed with the patient and/or guardian. Pt and/or guardian engaged in the decision making process and verbalized understanding of the options discussed and agreed with the final plan.    Precepted today with: Dr. Juan C Aguilar MD, MPH (PGY 3)  [facility] Family Medicine Resident  Pager: [phone]

## 2020-11-16 VITALS
DIASTOLIC BLOOD PRESSURE: 60 MMHG | SYSTOLIC BLOOD PRESSURE: 97 MMHG | RESPIRATION RATE: 20 BRPM | BODY MASS INDEX: 26.11 KG/M2 | HEIGHT: 60 IN | WEIGHT: 133 LBS | OXYGEN SATURATION: 98 %

## 2020-11-16 DIAGNOSIS — O09.92 HIGH-RISK PREGNANCY IN SECOND TRIMESTER: Primary | ICD-10-CM

## 2020-11-16 LAB — GLU GEST SCREEN 1HR 50G: 149 (ref 0–129)

## 2020-11-16 PROCEDURE — 82950 GLUCOSE TEST: CPT | Performed by: STUDENT IN AN ORGANIZED HEALTH CARE EDUCATION/TRAINING PROGRAM

## 2020-11-16 PROCEDURE — 36415 COLL VENOUS BLD VENIPUNCTURE: CPT | Performed by: STUDENT IN AN ORGANIZED HEALTH CARE EDUCATION/TRAINING PROGRAM

## 2020-11-16 ASSESSMENT — MIFFLIN-ST. JEOR: SCORE: 1247.29

## 2020-11-16 NOTE — PROGRESS NOTES
I have reviewed the history and physical examination. I was present for key portions of the visit and agree with the assessment and plan as documented above by Dr. Aguilar for 30 yr old  @ 25 wks here for prenatal care. FHT/ FH appropriate.  Current issues include ) chronic Hep B - needs to see hepatology.  /term labor precautions given.  Continue routine prenatal care.     Juan C Licea MD  November 15, 2020  6:10 PM

## 2020-11-16 NOTE — NURSING NOTE
"Chief Complaint   Patient presents with     Prenatal Care     25 weeks. 1 hour glucose due     Medication Reconciliation     unable to review       BP 97/60 (BP Location: Right arm, Patient Position: Sitting, Cuff Size: Adult Regular)   Resp 20   Ht 1.52 m (4' 11.84\")   Wt 60.3 kg (133 lb)   LMP 05/23/2020   SpO2 98%   BMI 26.11 kg/m      BP Recheck if applicable: NA    (System down)      ~ Mamadou Aragon CMA ([place])  [facility]  Phone: [phone]    "

## 2020-11-20 DIAGNOSIS — O09.92 HIGH-RISK PREGNANCY IN SECOND TRIMESTER: ICD-10-CM

## 2020-11-20 LAB
GLU, 1 HOUR, 100 G: 170 MG/DL
GLU, 2 HOUR, 100 G: 158 MG/DL
GLU, 3 HOUR, 100 G: 128 MG/DL
GLUCOSE P FAST SERPL-MCNC: 76 MG/DL

## 2020-11-20 PROCEDURE — 36415 COLL VENOUS BLD VENIPUNCTURE: CPT

## 2020-11-20 PROCEDURE — 82951 GLUCOSE TOLERANCE TEST (GTT): CPT

## 2020-11-25 ENCOUNTER — TRANSFERRED RECORDS (OUTPATIENT)
Dept: HEALTH INFORMATION MANAGEMENT | Facility: CLINIC | Age: 30
End: 2020-11-25

## 2020-12-14 ENCOUNTER — HEALTH MAINTENANCE LETTER (OUTPATIENT)
Age: 30
End: 2020-12-14

## 2021-01-04 ENCOUNTER — OFFICE VISIT (OUTPATIENT)
Dept: FAMILY MEDICINE | Facility: CLINIC | Age: 31
End: 2021-01-04
Payer: COMMERCIAL

## 2021-01-04 VITALS
RESPIRATION RATE: 22 BRPM | HEIGHT: 61 IN | BODY MASS INDEX: 26.06 KG/M2 | DIASTOLIC BLOOD PRESSURE: 58 MMHG | OXYGEN SATURATION: 100 % | SYSTOLIC BLOOD PRESSURE: 87 MMHG | TEMPERATURE: 98.6 F | WEIGHT: 138 LBS | HEART RATE: 82 BPM

## 2021-01-04 DIAGNOSIS — O09.92 HIGH-RISK PREGNANCY IN SECOND TRIMESTER: Primary | ICD-10-CM

## 2021-01-04 DIAGNOSIS — O09.899 SHORT INTERVAL BETWEEN PREGNANCIES AFFECTING PREGNANCY, ANTEPARTUM: ICD-10-CM

## 2021-01-04 DIAGNOSIS — O34.219 VBAC (VAGINAL BIRTH AFTER CESAREAN): ICD-10-CM

## 2021-01-04 PROCEDURE — 99207 PR PRENATAL VISIT: CPT | Mod: GC | Performed by: STUDENT IN AN ORGANIZED HEALTH CARE EDUCATION/TRAINING PROGRAM

## 2021-01-04 PROCEDURE — 90471 IMMUNIZATION ADMIN: CPT | Performed by: STUDENT IN AN ORGANIZED HEALTH CARE EDUCATION/TRAINING PROGRAM

## 2021-01-04 PROCEDURE — 90715 TDAP VACCINE 7 YRS/> IM: CPT | Performed by: STUDENT IN AN ORGANIZED HEALTH CARE EDUCATION/TRAINING PROGRAM

## 2021-01-04 ASSESSMENT — MIFFLIN-ST. JEOR: SCORE: 1282.46

## 2021-01-04 NOTE — PATIENT INSTRUCTIONS
[facility] Information- FAX NUMBER: [phone]  If you have any further concerns or wish to schedule another appointment, please call our office at [phone] during normal business hours (8-5, M-F).      For uncomplicated pregnancies, you will be seeing your doctor once a month until you are 28 weeks, then you will see your doctor twice a month. You will begin weekly visits at 36 weeks until you deliver.      If you have urgent medical questions that cannot wait, you may call [phone] at any time of day.      If you have a medical emergency, please call 911.     When to call or come in to the hospital    If you notice a decrease in your baby's movement.     If your begin to experience contractions that are 5 minutes or less apart and lasting for over 45 seconds, or if contractions are becoming more painful.    If you have any bleeding or leakage of fluids.     If you have a headache not resolved with tylenol, right upper abdominal pain, or sudden onset of swelling.    You know your body best. Never hesitate to call or go to the hospital if something doesn't feel right!    [facility]  1. Address: [address] [place] [address]. [place] [address]  2. Phone: [phone]     Preparing for the hospital  You re likely feeling anxious as your child s birth approaches. This is normal. To give yourself some peace of mind, pack a bag 3-4 weeks before your due date. Here is a list of things to remember:    Personal care items like toothbrush, hair brush, lip balm, lotion, shampoo, glasses, contacts    Nightgown, bathrobe, slippers    Several pairs of underwear    Comfortable clothes for you to wear home    Camera with new batteries    Cell phone and     Insurance information and any other paperwork needed for your hospital stay    Clothes for baby to wear home    An infant, rear-facing car seat for bringing home your baby (this is required by law)    In case of an EMERGENCY or you START  TO FEEL CONTRACTIONS:  Once you start to feel contractions (I.e. increased abdominal discomfort/cramping, with increase frequency) and/or notice gush of water/blood coming from your vagina, PLEASE GO TO [facility] - LABOR AND DELIVERY.    My Pager sometimes [phone] --> REQUEST THE NURSING STAFF TO CALL ME (DR. MARIELENA AGUILAR) - THEY HAVE MY CELL NUMBER IN THE UNIT.  Marielena Aguilar MD, MPH (PGY 3)  [facility] Family Medicine Resident

## 2021-01-04 NOTE — PROGRESS NOTES
Preceptor Attestation:   Patient seen, evaluated and discussed with the resident. I have verified the content of the note, which accurately reflects my assessment of the patient and the plan of care.  Supervising Physician:Ashwin Andujar MD  [facility]

## 2021-01-04 NOTE — PROGRESS NOTES
32w 3d         HPI:         Yo Aragon is a 30 year old  female with history of chronic hepatitis B during pregnancy (last pregnancy) who presents to clinic for OB visit.  Her last menstrual period was 2020 (unsure). Estimated Date of Delivery: 2021 based on US performed at 17w6d.    Currently 32w3d based on second trimester US.     Chief Complaint   Patient presents with     Prenatal Care     No concerns     Medication Reconciliation     Completed         Significant prenatal history:  -Hepatology for management of chronic hepatitis B during pregnancy: Followed by Hepatology during recent prior pregnancy, was on Viread which was stopped at the end of pregnancy. Needs to re-estalish care with Hepatology   -Pap smear up to date, next pap due 2022  -Failed 1-hour; passed 3-hour      She has not had bleeding since her LMP . She has not had any abdominal pain or cramping since her LMP.  She has had mild nausea with no vomiting and with normal appetite.  Weight gain has occurred.  This was an unplanned but desired pregnancy.     Flu shot given 2020    US OB on 2020  1.  Single living intrauterine gestation.  2.  Based on first  ultrasound, composite age of 23 weeks 4 days  with EDC 2021 .  3.  Normal interval growth.  4.  Normal fetal survey.    Medication Reconciliation completed           Review of Systems:     C: NEGATIVE for fatigue, unexpected change in weight  E: NEGATIVE for acute vision problems or changes  R: NEGATIVE for significant cough or shortness of breath  CV: NEGATIVE for chest pain, palpitations or new or worsening peripheral edema  P: NEGATIVE for changes in mood or affect       Allergies   Allergen Reactions     No Known Allergies             PMHX:     Patient Active Problem List   Diagnosis     Anemia of pregnancy      delivery delivered     Viral hepatitis B chronic (H)     History of      High-risk pregnancy in second trimester     Short interval  between pregnancies affecting pregnancy, antepartum       OB History    Para Term  AB Living   4 3 3 0 0 4   SAB TAB Ectopic Multiple Live Births   0 0 0 1 4      # Outcome Date GA Lbr Yonas/2nd Weight Sex Delivery Anes PTL Lv   4 Current            3 Term 20 40w6d  3.771 kg (8 lb 5 oz) M  [facility]      Birth Comments: Complications: Nuchal, tight,delivered through      Complications: Nuchal cord affecting delivery      Name: Jeramy Monico      Apgar1: 8  Apgar5: 9   2A Term 17 38w0d  2.807 kg (6 lb 3 oz) M CS-LTranv Spinal N MARTHA      Name: JASEN,MALE A-MT      Apgar1: 8  Apgar5: 9   2B Term 17 38w0d  2.67 kg (5 lb 14.2 oz) M CS-Unspec Spinal N MARTHA      Name: JASEN,MALE B-MT      Apgar1: 9  Apgar5: 9   1 Term 13        MARTHA       Past Medical History:   Diagnosis Date     High-risk pregnancy in third trimester 2019     Viral hepatitis B chronic (H)        Current Outpatient Medications   Medication Sig Dispense Refill     ferrous sulfate (FEROSUL) 325 (65 Fe) MG tablet Take 1 tablet (325 mg) by mouth daily (with breakfast) 90 tablet 1     Prenatal Vit-Fe Fumarate-FA (PRENATAL MULTIVITAMIN W/IRON) 27-0.8 MG tablet Take 1 tablet by mouth daily 90 tablet 1       No results found for this or any previous visit (from the past 24 hour(s)).          Past Surgical HX:              Social HX:     Social History     Socioeconomic History     Marital status: Single     Spouse name: None     Number of children: None     Years of education: None     Highest education level: None   Occupational History     None   Social Needs     Financial resource strain: None     Food insecurity     Worry: None     Inability: None     Transportation needs     Medical: None     Non-medical: None   Tobacco Use     Smoking status: Passive Smoke Exposure - Never Smoker     Smokeless tobacco: Never Used     Tobacco comment: vapor exposure    Substance and Sexual Activity     Alcohol use: Not  "Currently     Frequency: Never     Drug use: Never     Sexual activity: None   Lifestyle     Physical activity     Days per week: None     Minutes per session: None     Stress: None   Relationships     Social connections     Talks on phone: None     Gets together: None     Attends Sabianism service: None     Active member of club or organization: None     Attends meetings of clubs or organizations: None     Relationship status: None     Intimate partner violence     Fear of current or ex partner: None     Emotionally abused: None     Physically abused: None     Forced sexual activity: None   Other Topics Concern     None   Social History Narrative     None     Have you used any tobacco products, alcohol or any other drugs during this pregnancy before or after your knew you were pregnant? none         Physical Exam:     Vitals:    21 1055 21 1056   BP: (!) 87/56 (!) 87/58   BP Location: Right arm Right arm   Patient Position: Sitting Sitting   Cuff Size: Adult Regular Adult Regular   Pulse: 82    Resp: 22    Temp: 98.6  F (37  C)    TempSrc: Oral    SpO2: 100%    Weight: 62.6 kg (138 lb)    Height: 1.54 m (5' 0.63\")      Body mass index is 26.39 kg/m .    BP (!) 87/58 (BP Location: Right arm, Patient Position: Sitting, Cuff Size: Adult Regular)   Pulse 82   Temp 98.6  F (37  C) (Oral)   Resp 22   Ht 1.54 m (5' 0.63\")   Wt 62.6 kg (138 lb)   LMP 2020   SpO2 100%   BMI 26.39 kg/m     GENERAL:  Pleasant pregnant female, alert, well groomed.  LUNGS:  Clear to auscultation.   HEART:  RRR without murmur.   ABDOMEN: Soft without masses , tenderness or organomegaly.  No CVA tenderness. Well healed scar from  section. Fundus palpable above symphysis pubis ~32cm. FHT   MUSCULOSKELETAL:  Full range of motion.   EXTREMITIES:  No edema. No significant varicosities.     Prenatal labs:   -   Hemoglobin   Date Value Ref Range Status   10/28/2020 10.0 (L) 11.7 - 15.7 g/dL Final         Assessment and " Plan     (O09.92) High-risk pregnancy in second trimester  (primary encounter diagnosis)  (O09.899) Short interval between pregnancies affecting pregnancy, antepartum  (O34.219)  (vaginal birth after )  Yo Aragon is a 30 year old  female with history of chronic hepatitis B during pregnancy (last pregnancy) who presents to clinic for OB visit.  Her last menstrual period was 2020 (unsure). Estimated Date of Delivery: 2021 based on US performed at 17w6d.    Currently 32w3d based on second trimester US. No concerns today    Plan:  -TDAP VACCINE (Adacel, Boostrix) given  -Patient is appropriate for repeat   -Provided counseling regarding  labor symptoms, depression and social support systems, breast feeding, contraception options after delivery. The patient plans to deliver at [facility] with myself   -Patient will continue taking prenatal vitamins and avoiding cigarettes & alcohol.   -Follow up on 2021    Options for treatment and follow-up care were reviewed with the patient and/or guardian. Pt and/or guardian engaged in the decision making process and verbalized understanding of the options discussed and agreed with the final plan.    Precepted today with: MD Mich Mcbride MD, MPH (PGY 3)  [facility] Family Medicine Resident  Pager: [phone]

## 2021-01-18 ENCOUNTER — OFFICE VISIT (OUTPATIENT)
Dept: FAMILY MEDICINE | Facility: CLINIC | Age: 31
End: 2021-01-18
Payer: COMMERCIAL

## 2021-01-18 VITALS
WEIGHT: 139.4 LBS | OXYGEN SATURATION: 99 % | BODY MASS INDEX: 26.32 KG/M2 | HEART RATE: 82 BPM | RESPIRATION RATE: 20 BRPM | HEIGHT: 61 IN | DIASTOLIC BLOOD PRESSURE: 56 MMHG | SYSTOLIC BLOOD PRESSURE: 84 MMHG | TEMPERATURE: 98.6 F

## 2021-01-18 DIAGNOSIS — O34.219 VBAC (VAGINAL BIRTH AFTER CESAREAN): ICD-10-CM

## 2021-01-18 DIAGNOSIS — O99.013 ANEMIA DURING PREGNANCY IN THIRD TRIMESTER: ICD-10-CM

## 2021-01-18 DIAGNOSIS — O09.899 SHORT INTERVAL BETWEEN PREGNANCIES AFFECTING PREGNANCY, ANTEPARTUM: Primary | ICD-10-CM

## 2021-01-18 DIAGNOSIS — O09.93 HIGH-RISK PREGNANCY IN THIRD TRIMESTER: ICD-10-CM

## 2021-01-18 PROCEDURE — 99207 PR PRENATAL VISIT: CPT | Mod: GC | Performed by: STUDENT IN AN ORGANIZED HEALTH CARE EDUCATION/TRAINING PROGRAM

## 2021-01-18 RX ORDER — FERROUS SULFATE 325(65) MG
325 TABLET ORAL
Qty: 90 TABLET | Refills: 1 | Status: SHIPPED | OUTPATIENT
Start: 2021-01-18 | End: 2021-03-15

## 2021-01-18 RX ORDER — PRENATAL VIT/IRON FUM/FOLIC AC 27MG-0.8MG
1 TABLET ORAL DAILY
Qty: 90 TABLET | Refills: 1 | Status: SHIPPED | OUTPATIENT
Start: 2021-01-18 | End: 2021-03-15

## 2021-01-18 ASSESSMENT — MIFFLIN-ST. JEOR: SCORE: 1288.81

## 2021-01-18 NOTE — PATIENT INSTRUCTIONS
[facility] Information- FAX NUMBER: [phone]  If you have any further concerns or wish to schedule another appointment, please call our office at [phone] during normal business hours (8-5, M-F).      For uncomplicated pregnancies, you will be seeing your doctor once a month until you are 28 weeks, then you will see your doctor twice a month. You will begin weekly visits at 36 weeks until you deliver.      If you have urgent medical questions that cannot wait, you may call [phone] at any time of day.      If you have a medical emergency, please call 911.     When to call or come in to the hospital    If you notice a decrease in your baby's movement.     If your begin to experience contractions that are 5 minutes or less apart and lasting for over 45 seconds, or if contractions are becoming more painful.    If you have any bleeding or leakage of fluids.     If you have a headache not resolved with tylenol, right upper abdominal pain, or sudden onset of swelling.    You know your body best. Never hesitate to call or go to the hospital if something doesn't feel right!    [facility]  1. Address: [address] [place] [address]. [place] [address]  2. Phone: [phone]     Preparing for the hospital  You re likely feeling anxious as your child s birth approaches. This is normal. To give yourself some peace of mind, pack a bag 3-4 weeks before your due date. Here is a list of things to remember:    Personal care items like toothbrush, hair brush, lip balm, lotion, shampoo, glasses, contacts    Nightgown, bathrobe, slippers    Several pairs of underwear    Comfortable clothes for you to wear home    Camera with new batteries    Cell phone and     Insurance information and any other paperwork needed for your hospital stay    Clothes for baby to wear home    An infant, rear-facing car seat for bringing home your baby (this is required by law)    In case of an EMERGENCY or you START  TO FEEL CONTRACTIONS:  Once you start to feel contractions (I.e. increased abdominal discomfort/cramping, with increase frequency) and/or notice gush of water/blood coming from your vagina, PLEASE GO TO [facility] - LABOR AND DELIVERY.    My Pager sometimes [phone] --> REQUEST THE NURSING STAFF TO CALL ME (DR. MARIELENA AGUILAR) - THEY HAVE MY CELL NUMBER IN THE UNIT.  Marielena Aguilar MD, MPH (PGY 3)  [facility] Family Medicine Resident

## 2021-02-01 ENCOUNTER — OFFICE VISIT (OUTPATIENT)
Dept: FAMILY MEDICINE | Facility: CLINIC | Age: 31
End: 2021-02-01
Payer: COMMERCIAL

## 2021-02-01 VITALS
RESPIRATION RATE: 20 BRPM | BODY MASS INDEX: 27.84 KG/M2 | HEIGHT: 60 IN | TEMPERATURE: 98.1 F | SYSTOLIC BLOOD PRESSURE: 88 MMHG | HEART RATE: 82 BPM | WEIGHT: 141.8 LBS | DIASTOLIC BLOOD PRESSURE: 56 MMHG | OXYGEN SATURATION: 100 %

## 2021-02-01 DIAGNOSIS — O09.93 HIGH-RISK PREGNANCY IN THIRD TRIMESTER: ICD-10-CM

## 2021-02-01 DIAGNOSIS — O34.219 VBAC (VAGINAL BIRTH AFTER CESAREAN): ICD-10-CM

## 2021-02-01 DIAGNOSIS — O09.899 SHORT INTERVAL BETWEEN PREGNANCIES AFFECTING PREGNANCY, ANTEPARTUM: Primary | ICD-10-CM

## 2021-02-01 DIAGNOSIS — B18.1 VIRAL HEPATITIS B CHRONIC (H): ICD-10-CM

## 2021-02-01 LAB
% GRANULOCYTES: 64.2 %G (ref 40–75)
GRANULOCYTES #: 4.1 K/UL (ref 1.6–8.3)
HCT VFR BLD AUTO: 31.3 % (ref 35–47)
HEMOGLOBIN: 9.6 G/DL (ref 11.7–15.7)
LYMPHOCYTES # BLD AUTO: 1.9 K/UL (ref 0.8–5.3)
LYMPHOCYTES NFR BLD AUTO: 29.4 %L (ref 20–48)
MCH RBC QN AUTO: 23.9 PG (ref 26.5–35)
MCHC RBC AUTO-ENTMCNC: 30.7 G/DL (ref 32–36)
MCV RBC AUTO: 78.1 FL (ref 78–100)
MID #: 0.4 K/UL (ref 0–2.2)
MID %: 6.4 %M (ref 0–20)
PLATELET # BLD AUTO: 286 K/UL (ref 150–450)
RBC # BLD AUTO: 4.01 M/UL (ref 3.8–5.2)
WBC # BLD AUTO: 6.4 K/UL (ref 4–11)

## 2021-02-01 PROCEDURE — 85025 COMPLETE CBC W/AUTO DIFF WBC: CPT | Performed by: STUDENT IN AN ORGANIZED HEALTH CARE EDUCATION/TRAINING PROGRAM

## 2021-02-01 PROCEDURE — 36415 COLL VENOUS BLD VENIPUNCTURE: CPT | Performed by: STUDENT IN AN ORGANIZED HEALTH CARE EDUCATION/TRAINING PROGRAM

## 2021-02-01 PROCEDURE — 99207 PR PRENATAL VISIT: CPT | Mod: GC | Performed by: STUDENT IN AN ORGANIZED HEALTH CARE EDUCATION/TRAINING PROGRAM

## 2021-02-01 ASSESSMENT — MIFFLIN-ST. JEOR: SCORE: 1293.45

## 2021-02-01 NOTE — PROGRESS NOTES
Preceptor Attestation:  Patient seen, examined, and discussed with the resident..  I agree with written assessment and plan of care.  Supervising Physician:  Margi Blackman MD  [facility]

## 2021-02-01 NOTE — PROGRESS NOTES
HPI:      Yo Aragon is a 30 year old  female with history of chronic hepatitis B during pregnancy (last pregnancy) and  who presents to clinic for OB visit.  Her last menstrual period was 2020 (unsure). Estimated Date of Delivery: 2021 based on US performed at 17w6d.  Currently 36w3d based on second trimester US.       Chief Complaint   Patient presents with     Prenatal Care     No concerns     Medication Reconciliation     Completed        Significant prenatal history:  -Hepatology for management of chronic hepatitis B during pregnancy: Followed by Hepatology during recent prior pregnancy, was on Viread which was stopped at the end of pregnancy. Yo has decided to re-estalish care with Hepatology after her pregnancy  -Pap smear up to date, next pap due 2022  -Failed 1-hour; passed 3-hour  - during last pregnancy --> spoke with Dr. Acuna in 2020   -Expecting baby boy    Current concerns:  - Patient reports doing well with no current concerns.   - No headaches, contractions, leakage of fluids, bleeding.  - Requests refill of MVT and iron supplement  - Flu shot given 2020  - TDap given on 2021    US OB on 2020  1.  Single living intrauterine gestation.  2.  Based on first  ultrasound, composite age of 23 weeks 4 days  with EDC 2021 .  3.  Normal interval growth.  4.  Normal fetal survey.    Prenatal labs reviewed: she is agreeable to Hep B viral titers today, repeat CBC and syphilis    Medication Reconciliation completed    No  was used for this visit.            Review of Systems:     C: NEGATIVE for fatigue, unexpected change in weight  E: NEGATIVE for acute vision problems or changes  R: NEGATIVE for significant cough or shortness of breath  CV: NEGATIVE for chest pain, palpitations or new or worsening peripheral edema  P: NEGATIVE for changes in mood or affect          OBSTETRIC HISTORY:       OB History    Para Term  AB  Living   4 3 3 0 0 4   SAB TAB Ectopic Multiple Live Births   0 0 0 1 4      # Outcome Date GA Lbr Yonas/2nd Weight Sex Delivery Anes PTL Lv   4 Current            3 Term 20 40w6d  3.771 kg (8 lb 5 oz) M  IV [facility] N MARTHA      Birth Comments: Complications: Nuchal, tight,delivered through      Complications: Nuchal cord affecting delivery      Name: Jeramy Monico      Apgar1: 8  Apgar5: 9   2A Term 17 38w0d  2.807 kg (6 lb 3 oz) M CS-LTranv Spinal N MARTHA      Name: GRACE AGGARWAL A-MT      Apgar1: 8  Apgar5: 9   2B Term 17 38w0d  2.67 kg (5 lb 14.2 oz) M CS-Unspec Spinal N MARTHA      Name: JASENMALE B-MT      Apgar1: 9  Apgar5: 9   1 Term 13        MARTHA            PMHX:     Patient Active Problem List   Diagnosis     Anemia of pregnancy      delivery delivered     Viral hepatitis B chronic (H)     History of      High-risk pregnancy in second trimester     Short interval between pregnancies affecting pregnancy, antepartum       OB History    Para Term  AB Living   4 3 3 0 0 4   SAB TAB Ectopic Multiple Live Births   0 0 0 1 4      # Outcome Date GA Lbr Yonas/2nd Weight Sex Delivery Anes PTL Lv   4 Current            3 Term 20 40w6d  3.771 kg (8 lb 5 oz) M  [facility] N MARTHA      Birth Comments: Complications: Nuchal, tight,delivered through      Complications: Nuchal cord affecting delivery      Name: Jeramy Monico      Apgar1: 8  Apgar5: 9   2A Term 17 38w0d  2.807 kg (6 lb 3 oz) M CS-LTranv Spinal N MARTHA      Name: GRACE AGGARWAL A-MT      Apgar1: 8  Apgar5: 9   2B Term 17 38w0d  2.67 kg (5 lb 14.2 oz) M CS-Unspec Spinal N MARTHA      Name: JASENMALE B-MT      Apgar1: 9  Apgar5: 9   1 Term 13        MARTHA       Past Medical History:   Diagnosis Date     High-risk pregnancy in third trimester 2019     Viral hepatitis B chronic (H)        Current Outpatient Medications   Medication Sig Dispense Refill     ferrous sulfate (FEROSUL) 325 (65 Fe) MG  tablet Take 1 tablet (325 mg) by mouth daily (with breakfast) 90 tablet 1     Prenatal Vit-Fe Fumarate-FA (PRENATAL MULTIVITAMIN W/IRON) 27-0.8 MG tablet Take 1 tablet by mouth daily 90 tablet 1       No results found for this or any previous visit (from the past 24 hour(s)).          Social HX:     Social History     Socioeconomic History     Marital status: Single     Spouse name: None     Number of children: None     Years of education: None     Highest education level: None   Occupational History     None   Social Needs     Financial resource strain: None     Food insecurity     Worry: None     Inability: None     Transportation needs     Medical: None     Non-medical: None   Tobacco Use     Smoking status: Passive Smoke Exposure - Never Smoker     Smokeless tobacco: Never Used     Tobacco comment: vapor exposure    Substance and Sexual Activity     Alcohol use: Not Currently     Frequency: Never     Drug use: Never     Sexual activity: None   Lifestyle     Physical activity     Days per week: None     Minutes per session: None     Stress: None   Relationships     Social connections     Talks on phone: None     Gets together: None     Attends [employer] service: None     Active member of club or organization: None     Attends meetings of clubs or organizations: None     Relationship status: None     Intimate partner violence     Fear of current or ex partner: None     Emotionally abused: None     Physically abused: None     Forced sexual activity: None   Other Topics Concern     None   Social History Narrative     None     Have you used any tobacco products, alcohol or any other drugs during this pregnancy before or after your knew you were pregnant? none         Physical Exam:            Physical Exam:     Vitals:    02/01/21 0830   BP: (!) 88/56   BP Location: Right arm   Patient Position: Sitting   Cuff Size: Adult Regular   Pulse: 82   Resp: 20   Temp: 98.1  F (36.7  C)   TempSrc: Oral   SpO2: 100%   Weight:  "64.3 kg (141 lb 12.8 oz)   Height: 1.53 m (5' 0.24\")     Body mass index is 27.48 kg/m .    BP (!) 88/56 (BP Location: Right arm, Patient Position: Sitting, Cuff Size: Adult Regular)   Pulse 82   Temp 98.1  F (36.7  C) (Oral)   Resp 20   Ht 1.53 m (5' 0.24\")   Wt 64.3 kg (141 lb 12.8 oz)   LMP 2020   SpO2 100%   BMI 27.48 kg/m     GENERAL:  Pleasant pregnant female, alert, well groomed.  SKIN:  Warm and dry, without lesions or rashes   LUNGS:  Clear to auscultation.   HEART:  RRR without murmur.   ABDOMEN: Soft without masses , tenderness or organomegaly.  No CVA tenderness. Well healed scar from  section. Fundus palpable above symphysis pubis ~36cm. FHT 140s  EXTREMITIES:  No edema. No significant varicosities.   GENITALIA:  Normal appearing anatomy, no lesions noted.  VAGINA:  Pink, normal rugae and discharge normal and physiologic, collected GBS sample      Assessment and Plan     (O09.899) Short interval between pregnancies affecting pregnancy, antepartum  (primary encounter diagnosis)  (O09.93) High-risk pregnancy in third trimester  (O34.219)  (vaginal birth after )  (O99.013) Anemia during pregnancy in third trimester    Plan:  -GBS pending.  -Expecting baby boy  -Spoke with Dr. Acuna about  2020   -Provided counseling regarding  labor symptoms, depression and social support systems, breast feeding, contraception options after delivery.   -Discussed preferences during labor.   -Reviewed potential interventions during labor including amniotomy, operative vaginal delivery and operative  delivery.   -The patient plans to deliver at [facility] with myself and OB on standby   -Patient will continue taking prenatal vitamins and avoiding cigarettes & alcohol.   -Follow up on 2021     Options for treatment and follow-up care were reviewed with the patient and/or guardian. Pt and/or guardian engaged in the decision making process and verbalized " understanding of the options discussed and agreed with the final plan.    Precepted today with: MD Mich Mcfarlane MD, MPH (PGY 3)  [facility] Family Medicine Resident  Pager: [phone]

## 2021-02-01 NOTE — PATIENT INSTRUCTIONS
[facility] Information- FAX NUMBER: [phone]  If you have any further concerns or wish to schedule another appointment, please call our office at [phone] during normal business hours (8-5, M-F).      For uncomplicated pregnancies, you will be seeing your doctor once a month until you are 28 weeks, then you will see your doctor twice a month. You will begin weekly visits at 36 weeks until you deliver.      If you have urgent medical questions that cannot wait, you may call [phone] at any time of day.      If you have a medical emergency, please call 911.     When to call or come in to the hospital    If you notice a decrease in your baby's movement.     If your begin to experience contractions that are 5 minutes or less apart and lasting for over 45 seconds, or if contractions are becoming more painful.    If you have any bleeding or leakage of fluids.     If you have a headache not resolved with tylenol, right upper abdominal pain, or sudden onset of swelling.    You know your body best. Never hesitate to call or go to the hospital if something doesn't feel right!    [facility]  1. Address: [address] [place] [address]. [place] [address]  2. Phone: [phone]     Preparing for the hospital  You re likely feeling anxious as your child s birth approaches. This is normal. To give yourself some peace of mind, pack a bag 3-4 weeks before your due date. Here is a list of things to remember:    Personal care items like toothbrush, hair brush, lip balm, lotion, shampoo, glasses, contacts    Nightgown, bathrobe, slippers    Several pairs of underwear    Comfortable clothes for you to wear home    Camera with new batteries    Cell phone and     Insurance information and any other paperwork needed for your hospital stay    Clothes for baby to wear home    An infant, rear-facing car seat for bringing home your baby (this is required by law)    In case of an EMERGENCY or you START  TO FEEL CONTRACTIONS:  Once you start to feel contractions (I.e. increased abdominal discomfort/cramping, with increase frequency) and/or notice gush of water/blood coming from your vagina, PLEASE GO TO [facility] - LABOR AND DELIVERY.    My Pager sometimes [phone] --> REQUEST THE NURSING STAFF TO CALL ME (DR. MARIELENA AGUILAR) - THEY HAVE MY CELL NUMBER IN THE UNIT.  Marielena Aguilar MD, MPH (PGY 3)  [facility] Family Medicine Resident

## 2021-02-02 LAB
ALLERGIC TO PENICILLIN: NORMAL
GP B STREP AG SPEC QL LA: NEGATIVE
TREPONEMA ANTIBODY (SYPHILIS): NEGATIVE

## 2021-02-04 LAB
HBV DNA SERPL NAA+PROBE-ACNC: ABNORMAL [IU]/ML
HBV DNA SERPL NAA+PROBE-LOG IU: >8.2 {LOG_IU}/ML

## 2021-02-15 ENCOUNTER — OFFICE VISIT (OUTPATIENT)
Dept: FAMILY MEDICINE | Facility: CLINIC | Age: 31
End: 2021-02-15
Payer: COMMERCIAL

## 2021-02-15 VITALS
BODY MASS INDEX: 27.88 KG/M2 | DIASTOLIC BLOOD PRESSURE: 54 MMHG | HEART RATE: 79 BPM | OXYGEN SATURATION: 100 % | WEIGHT: 142 LBS | RESPIRATION RATE: 20 BRPM | HEIGHT: 60 IN | SYSTOLIC BLOOD PRESSURE: 90 MMHG | TEMPERATURE: 97.7 F

## 2021-02-15 DIAGNOSIS — B18.1 VIRAL HEPATITIS B CHRONIC (H): ICD-10-CM

## 2021-02-15 DIAGNOSIS — O34.219 VBAC (VAGINAL BIRTH AFTER CESAREAN): ICD-10-CM

## 2021-02-15 DIAGNOSIS — O99.013 ANEMIA DURING PREGNANCY IN THIRD TRIMESTER: ICD-10-CM

## 2021-02-15 DIAGNOSIS — O09.93 HIGH-RISK PREGNANCY IN THIRD TRIMESTER: ICD-10-CM

## 2021-02-15 DIAGNOSIS — O09.899 SHORT INTERVAL BETWEEN PREGNANCIES AFFECTING PREGNANCY, ANTEPARTUM: Primary | ICD-10-CM

## 2021-02-15 PROCEDURE — 99207 PR PRENATAL VISIT: CPT | Mod: GC | Performed by: STUDENT IN AN ORGANIZED HEALTH CARE EDUCATION/TRAINING PROGRAM

## 2021-02-15 ASSESSMENT — MIFFLIN-ST. JEOR: SCORE: 1290.61

## 2021-02-15 NOTE — PROGRESS NOTES
HPI:       Yo Aragon is a 30 year old  female with history of chronic hepatitis B during pregnancy (last pregnancy) and  who presents to clinic for OB visit.  Her last menstrual period was 2020 (unsure). Estimated Date of Delivery: 2021 based on US performed at 17w6d.      Currently 38w3d based on second trimester US.     Chief Complaint   Patient presents with     Prenatal Care     39wks ob care, no concern     Medication Reconciliation     complete     Significant prenatal history:  -Hepatology for management of chronic hepatitis B during pregnancy: Followed by Hepatology during recent prior pregnancy, was on Viread which was stopped at the end of pregnancy. Yo has decided to re-estalish care with Hepatology after her pregnancy  -Pap smear up to date, next pap due 2022  -Failed 1-hour; passed 3-hour  - during last pregnancy --> spoke with Dr. Acuna in 2020   -Expecting baby boy    Current concerns:  - Patient reports doing well with no current concerns.   - No headaches, contractions, leakage of fluids, bleeding.  - Requests refill of MVT and iron supplement  - Flu shot given 2020  - TDap given on 2021  -GBS negative (2021)    US OB on 2020  1.  Single living intrauterine gestation.  2.  Based on first  ultrasound, composite age of 23 weeks 4 days  with EDC 2021 .  3.  Normal interval growth.  4.  Normal fetal survey.     Prenatal labs reviewed: she is agreeable to Hep B viral titers today, repeat CBC and syphilis    Medication Reconciliation completed               Review of Systems:     C: NEGATIVE for fatigue, unexpected change in weight  E: NEGATIVE for acute vision problems or changes  R: NEGATIVE for significant cough or shortness of breath  CV: NEGATIVE for chest pain, palpitations or new or worsening peripheral edema  P: NEGATIVE for changes in mood or affect     Allergies   Allergen Reactions     No Known Allergies             PMHX:      Patient Active Problem List   Diagnosis     Anemia of pregnancy      delivery delivered     Viral hepatitis B chronic (H)     History of      High-risk pregnancy in second trimester     Short interval between pregnancies affecting pregnancy, antepartum       OB History    Para Term  AB Living   4 3 3 0 0 4   SAB TAB Ectopic Multiple Live Births   0 0 0 1 4      # Outcome Date GA Lbr Yonas/2nd Weight Sex Delivery Anes PTL Lv   4 Current            3 Term 20 40w6d  3.771 kg (8 lb 5 oz) M  [facility] N MARTHA      Birth Comments: Complications: Nuchal, tight,delivered through      Complications: Nuchal cord affecting delivery      Name: Jeramy Marc      Apgar1: 8  Apgar5: 9   2A Term 17 38w0d  2.807 kg (6 lb 3 oz) M CS-LTranv Spinal N MARTHA      Name: JASENMALE A-MT      Apgar1: 8  Apgar5: 9   2B Term 17 38w0d  2.67 kg (5 lb 14.2 oz) M CS-Unspec Spinal N MARTHA      Name: JASEN,MALE B-MT      Apgar1: 9  Apgar5: 9   1 Term 13        MARTHA       Past Medical History:   Diagnosis Date     High-risk pregnancy in third trimester 2019     Viral hepatitis B chronic (H)        Current Outpatient Medications   Medication Sig Dispense Refill     ferrous sulfate (FEROSUL) 325 (65 Fe) MG tablet Take 1 tablet (325 mg) by mouth daily (with breakfast) 90 tablet 1     Prenatal Vit-Fe Fumarate-FA (PRENATAL MULTIVITAMIN W/IRON) 27-0.8 MG tablet Take 1 tablet by mouth daily 90 tablet 1       No results found for this or any previous visit (from the past 24 hour(s)).          Past Surgical HX:            Social HX:     Social History     Socioeconomic History     Marital status: Single     Spouse name: None     Number of children: None     Years of education: None     Highest education level: None   Occupational History     None   Social Needs     Financial resource strain: None     Food insecurity     Worry: None     Inability: None     Transportation needs     Medical: None      Non-medical: None   Tobacco Use     Smoking status: Passive Smoke Exposure - Never Smoker     Smokeless tobacco: Never Used     Tobacco comment: vapor exposure    Substance and Sexual Activity     Alcohol use: Not Currently     Frequency: Never     Drug use: Never     Sexual activity: None   Lifestyle     Physical activity     Days per week: None     Minutes per session: None     Stress: None   Relationships     Social connections     Talks on phone: None     Gets together: None     Attends [employer] service: None     Active member of club or organization: None     Attends meetings of clubs or organizations: None     Relationship status: None     Intimate partner violence     Fear of current or ex partner: None     Emotionally abused: None     Physically abused: None     Forced sexual activity: None   Other Topics Concern     None   Social History Narrative     None     Have you used any tobacco products, alcohol or any other drugs during this pregnancy before or after your knew you were pregnant? none         Physical Exam:     Vitals:    02/15/21 0824   BP: 90/54   BP Location: Right arm   Pulse: 79   Resp: 20   Temp: 97.7  F (36.5  C)   TempSrc: Oral   SpO2: 100%   Weight: 64.4 kg (142 lb)   Height: 1.524 m (5')     Body mass index is 27.73 kg/m .    BP 90/54 (BP Location: Right arm)   Pulse 79   Temp 97.7  F (36.5  C) (Oral)   Resp 20   Ht 1.524 m (5')   Wt 64.4 kg (142 lb)   LMP 2020   SpO2 100%   BMI 27.73 kg/m     GENERAL:  Pleasant pregnant female, alert, well groomed.  SKIN:  Warm and dry, without lesions or rashes   LUNGS:  Clear to auscultation.   HEART:  RRR without murmur.   ABDOMEN: Soft without masses , tenderness or organomegaly.  No CVA tenderness. Well healed scar from  section. Fundus palpable above symphysis pubis ~39cm.   MUSCULOSKELETAL:  Full range of motion.   EXTREMITIES:  No edema. No significant varicosities.   GENITALIA:  Normal appearing anatomy, no lesions  noted.  CERVIX:  1-2 cm/ 0%/-3      Prenatal labs:   -   Hemoglobin   Date Value Ref Range Status   2021 9.6 (L) 11.7 - 15.7 g/dL Final         Assessment and Plan     (O09.899) Short interval between pregnancies affecting pregnancy, antepartum  (primary encounter diagnosis)  (O09.93) High-risk pregnancy in third trimester  (O34.219)  (vaginal birth after )  (B18.1) Viral hepatitis B chronic (H)  (O99.013) Anemia during pregnancy in third trimester    Yo Aragon is a 30 year old  female with history of chronic hepatitis B during pregnancy (last pregnancy) and  who presents to clinic for OB visit.  Her last menstrual period was 2020 (unsure). Estimated Date of Delivery: 2021 based on US performed at 17w6d.      Currently 38w3d based on second trimester US. GBS negative    Plan:    -Expecting baby boy - declines circumcision  -Spoke with Dr. Acuna about  2020   -Provided counseling regarding  labor symptoms, depression and social support systems, breast feeding, contraception options after delivery.   -Discussed preferences during labor.   -Reviewed potential interventions during labor including amniotomy, operative vaginal delivery and operative  delivery.   -The patient plans to deliver at [facility] with myself and OB on standby   -Patient will continue taking prenatal vitamins and avoiding cigarettes & alcohol.   -Follow up on 2021       Options for treatment and follow-up care were reviewed with the patient and/or guardian. Pt and/or guardian engaged in the decision making process and verbalized understanding of the options discussed and agreed with the final plan.    Precepted today with: MD Mich Coleman MD, MPH (PGY 3)  [facility] Family Medicine Resident  Pager: [phone]

## 2021-02-15 NOTE — PATIENT INSTRUCTIONS
[facility] Information- FAX NUMBER: [phone]  If you have any further concerns or wish to schedule another appointment, please call our office at [phone] during normal business hours (8-5, M-F).      For uncomplicated pregnancies, you will be seeing your doctor once a month until you are 28 weeks, then you will see your doctor twice a month. You will begin weekly visits at 36 weeks until you deliver.      If you have urgent medical questions that cannot wait, you may call [phone] at any time of day.      If you have a medical emergency, please call 911.     When to call or come in to the hospital    If you notice a decrease in your baby's movement.     If your begin to experience contractions that are 5 minutes or less apart and lasting for over 45 seconds, or if contractions are becoming more painful.    If you have any bleeding or leakage of fluids.     If you have a headache not resolved with tylenol, right upper abdominal pain, or sudden onset of swelling.    You know your body best. Never hesitate to call or go to the hospital if something doesn't feel right!    [facility]  1. Address: [address] [place] [address]. [place] [address]  2. Phone: [phone]     Preparing for the hospital  You re likely feeling anxious as your child s birth approaches. This is normal. To give yourself some peace of mind, pack a bag 3-4 weeks before your due date. Here is a list of things to remember:    Personal care items like toothbrush, hair brush, lip balm, lotion, shampoo, glasses, contacts    Nightgown, bathrobe, slippers    Several pairs of underwear    Comfortable clothes for you to wear home    Camera with new batteries    Cell phone and     Insurance information and any other paperwork needed for your hospital stay    Clothes for baby to wear home    An infant, rear-facing car seat for bringing home your baby (this is required by law)    In case of an EMERGENCY or you START  TO FEEL CONTRACTIONS:  Once you start to feel contractions (I.e. increased abdominal discomfort/cramping, with increase frequency) and/or notice gush of water/blood coming from your vagina, PLEASE GO TO [facility] - LABOR AND DELIVERY.    My Pager sometimes [phone] --> REQUEST THE NURSING STAFF TO CALL ME (DR. MARIELENA AGUILAR) - THEY HAVE MY CELL NUMBER IN THE UNIT.  Marielena Aguilar MD, MPH (PGY 3)  [facility] Family Medicine Resident

## 2021-02-22 ENCOUNTER — OFFICE VISIT (OUTPATIENT)
Dept: FAMILY MEDICINE | Facility: CLINIC | Age: 31
End: 2021-02-22
Payer: COMMERCIAL

## 2021-02-22 VITALS
OXYGEN SATURATION: 100 % | BODY MASS INDEX: 28.58 KG/M2 | DIASTOLIC BLOOD PRESSURE: 69 MMHG | TEMPERATURE: 98.1 F | RESPIRATION RATE: 20 BRPM | HEART RATE: 105 BPM | HEIGHT: 60 IN | WEIGHT: 145.6 LBS | SYSTOLIC BLOOD PRESSURE: 103 MMHG

## 2021-02-22 DIAGNOSIS — O09.899 SHORT INTERVAL BETWEEN PREGNANCIES AFFECTING PREGNANCY, ANTEPARTUM: Primary | ICD-10-CM

## 2021-02-22 DIAGNOSIS — O09.93 HIGH-RISK PREGNANCY IN THIRD TRIMESTER: ICD-10-CM

## 2021-02-22 DIAGNOSIS — O99.013 ANEMIA DURING PREGNANCY IN THIRD TRIMESTER: ICD-10-CM

## 2021-02-22 DIAGNOSIS — B18.1 VIRAL HEPATITIS B CHRONIC (H): ICD-10-CM

## 2021-02-22 DIAGNOSIS — O34.219 VBAC (VAGINAL BIRTH AFTER CESAREAN): ICD-10-CM

## 2021-02-22 PROCEDURE — 99207 PR PRENATAL VISIT: CPT | Mod: GC | Performed by: STUDENT IN AN ORGANIZED HEALTH CARE EDUCATION/TRAINING PROGRAM

## 2021-02-22 ASSESSMENT — MIFFLIN-ST. JEOR: SCORE: 1299

## 2021-02-22 NOTE — PROGRESS NOTES
"       HPI:      Yo Aragon is a 30 year old  female with history of chronic hepatitis B during pregnancy (last pregnancy) and  who presents to clinic for OB visit.  Her last menstrual period was 2020 (unsure). Estimated Date of Delivery: 2021 based on US performed at 17w6d.  Currently 39w3d based on second trimester US.     Patient presents with:  Prenatal Care: ISMAEL No other concerns  Medication Reconciliation: Completed    Significant prenatal history:  -Hepatology for management of chronic hepatitis B during pregnancy: Followed by Hepatology during recent prior pregnancy, was on Viread which was stopped at the end of pregnancy. Yo has decided to re-estalish care with Hepatology after her pregnancy  -Pap smear up to date, next pap due 2022  -Failed 1-hour; passed 3-hour  - during last pregnancy --> spoke with Dr. Acuna in 2020   -Expecting baby boy   -Last cervix exam 21:  1-2 cm/ 0%/-3     Current concerns:  - Patient reports doing well.   - Patient asks, \"what will happen if I goes past my due date?\" She asks about membrane stripping as this was done last pregnancy and she went into labor that same day.   - No headaches, abdominal pain,contractions, leakage of fluids, bleeding.  - Taking prenatal vitamin.   - Flu shot given 2020  -TDap given on 2021  -GBS negative (2021)     US OB on 2020  1.  Single living intrauterine gestation.  2.  Based on first  ultrasound, composite age of 23 weeks 4 days  with EDC 2021 .  3.  Normal interval growth.  4.  Normal fetal survey.     Prenatal labs reviewed from 21:  HBV DNA Quant >170,000,000  Syphilis negative  GBS negative  CBC with hgb of 9.6; otherwise WNL.      Medication Reconciliation completed          Review of Systems:     C: NEGATIVE for fatigue, unexpected change in weight  H: NEGATIVE for headache  E: NEGATIVE for acute vision problems or changes  R: NEGATIVE for significant cough or shortness " of breath  CV: NEGATIVE for chest pain, palpitations or new or worsening peripheral edema  P: NEGATIVE for changes in mood or affect          OBSTETRIC HISTORY:     OB History    Para Term  AB Living   4 3 3 0 0 4   SAB TAB Ectopic Multiple Live Births   0 0 0 1 4      # Outcome Date GA Lbr Yonas/2nd Weight Sex Delivery Anes PTL Lv   4 Current            3 Term 20 40w6d  3.771 kg (8 lb 5 oz) M  [facility]A      Birth Comments: Complications: Nuchal, tight,delivered through      Complications: Nuchal cord affecting delivery      Name: Jeramy Monico      Apgar1: 8  Apgar5: 9   2A Term 17 38w0d  2.807 kg (6 lb 3 oz) M CS-LTranv Spinal N MARTHA      Name: GRACE AGGARWAL A-MT      Apgar1: 8  Apgar5: 9   2B Term 17 38w0d  2.67 kg (5 lb 14.2 oz) M CS-Unspec Spinal N MARTHA      Name: GRACE AGGARWAL B-MT      Apgar1: 9  Apgar5: 9   1 Term 13        MARTHA          PMHX:     Patient Active Problem List   Diagnosis     Anemia of pregnancy      delivery delivered     Viral hepatitis B chronic (H)     History of      High-risk pregnancy in second trimester     Short interval between pregnancies affecting pregnancy, antepartum     OB History    Para Term  AB Living   4 3 3 0 0 4   SAB TAB Ectopic Multiple Live Births   0 0 0 1 4      # Outcome Date GA Lbr Yonas/2nd Weight Sex Delivery Anes PTL Lv   4 Current            3 Term 20 40w6d  3.771 kg (8 lb 5 oz) M  [facility]      Birth Comments: Complications: Nuchal, tight,delivered through      Complications: Nuchal cord affecting delivery      Name: Jeramy Monico      Apgar1: 8  Apgar5: 9   2A Term 17 38w0d  2.807 kg (6 lb 3 oz) M CS-LTranv Spinal N MARTHA      Name: GRACE AGGARWAL A-MT      Apgar1: 8  Apgar5: 9   2B Term 17 38w0d  2.67 kg (5 lb 14.2 oz) M CS-Unspec Spinal N MARTHA      Name: GRACE AGGARWAL B-MT      Apgar1: 9  Apgar5: 9   1 Term 13        MARTHA     Past Medical History:   Diagnosis Date      High-risk pregnancy in third trimester 12/30/2019     Viral hepatitis B chronic (H)      Current Outpatient Medications   Medication Sig Dispense Refill     ferrous sulfate (FEROSUL) 325 (65 Fe) MG tablet Take 1 tablet (325 mg) by mouth daily (with breakfast) 90 tablet 1     Prenatal Vit-Fe Fumarate-FA (PRENATAL MULTIVITAMIN W/IRON) 27-0.8 MG tablet Take 1 tablet by mouth daily 90 tablet 1     No results found for this or any previous visit (from the past 24 hour(s)).          Social HX:     Social History     Socioeconomic History     Marital status: Single     Spouse name: Not on file     Number of children: Not on file     Years of education: Not on file     Highest education level: Not on file   Occupational History     Not on file   Social Needs     Financial resource strain: Not on file     Food insecurity     Worry: Not on file     Inability: Not on file     Transportation needs     Medical: Not on file     Non-medical: Not on file   Tobacco Use     Smoking status: Passive Smoke Exposure - Never Smoker     Smokeless tobacco: Never Used     Tobacco comment: vapor exposure    Substance and Sexual Activity     Alcohol use: Not Currently     Frequency: Never     Drug use: Never     Sexual activity: Not on file   Lifestyle     Physical activity     Days per week: Not on file     Minutes per session: Not on file     Stress: Not on file   Relationships     Social connections     Talks on phone: Not on file     Gets together: Not on file     Attends Worship service: Not on file     Active member of club or organization: Not on file     Attends meetings of clubs or organizations: Not on file     Relationship status: Not on file     Intimate partner violence     Fear of current or ex partner: Not on file     Emotionally abused: Not on file     Physically abused: Not on file     Forced sexual activity: Not on file   Other Topics Concern     Not on file   Social History Narrative     Not on file     Have you used any  "tobacco products, alcohol or any other drugs during this pregnancy before or after your knew you were pregnant? No         Physical Exam:     Vitals:    21 1309   BP: 103/69   Pulse: 105   Resp: 20   Temp: 98.1  F (36.7  C)   TempSrc: Oral   SpO2: 100%   Weight: 66 kg (145 lb 9.6 oz)   Height: 1.511 m (4' 11.5\")     Body mass index is 28.92 kg/m .    /69   Pulse 105   Temp 98.1  F (36.7  C) (Oral)   Resp 20   Ht 1.511 m (4' 11.5\")   Wt 66 kg (145 lb 9.6 oz)   LMP 2020   SpO2 100%   BMI 28.92 kg/m     GENERAL:  Pleasant pregnant female, alert, well groomed.  SKIN:  Warm and dry, without lesions or rashes   LUNGS:  Clear to auscultation.   HEART:  RRR without murmur.  ABDOMEN: Soft without tenderness or organomegaly. Fundus palpable above symphysis pubis ~ 41cm. FHT 150s  MUSCULOSKELETAL:  Full range of motion.   EXTREMITIES:  No edema. No significant varicosities.  GENITALIA:  Normal appearing anatomy, no lesions noted.  CERVIX:  smooth, without discharge and parous os; 1-2 cm/0%/-3. Stripped membranes today.     Prenatal labs: none since last visit.    Assessment and Plan     (O09.899) Short interval between pregnancies affecting pregnancy, antepartum  (primary encounter diagnosis)  (O09.93) High-risk pregnancy in third trimester  (O34.219)  (vaginal birth after )  (B18.1) Viral hepatitis B chronic (H)  (O99.013) Anemia during pregnancy in third trimester    Yo Aragon is a 30 year old  female with history of chronic hepatitis B during pregnancy (last pregnancy) and  who presents to clinic for OB visit.  Her last menstrual period was 2020 (unsure). Estimated Date of Delivery: 2021 based on US performed at 17w6d. Currently 39w3d based on second trimester US. GBS negative. No acute concerns.    Plan:  Stripped membranes today. Discussed membrane stripping does not have significant evidence of quickening delivery, but patient wanted to try.   Expecting baby boy " - declines circumcision  -Spoke with Dr. Acuna about  2020. Will contact Dr. Acuna again to discuss post-dates plan of delivery.   -Provided counseling regarding  labor symptoms, depression and social support systems, breast feeding, contraception options after delivery.   -Discussed preferences during labor: TOLAC, uncertain about epidural at this time.   -Reviewed potential interventions during labor including amniotomy, operative vaginal delivery and operative  delivery.   -The patient plans to deliver at [facility] with myself and OB on standby   -Patient will continue taking prenatal vitamins and avoiding cigarettes & alcohol  -Follow up 21 for routine prenatal visit, sooner if labor symptoms.     Options for treatment and follow-up care were reviewed with the patient and/or guardian. Pt and/or guardian engaged in the decision making process and verbalized understanding of the options discussed and agreed with the final plan.    Precepted today with: MD Sherry Jovel, MS4    I was present with the medical student who participated in the service and in the documentation of the note. I have verified the history and personally performed the physical exam and medical decision making, and have verified the content of the note, which accurately reflects my assessment of the patient and the plan of care    Mich Aguilar MD, MPH (PGY 3)  [facility] Resident  Pager: [phone]

## 2021-02-22 NOTE — PATIENT INSTRUCTIONS
[facility] Information- FAX NUMBER: [phone]  If you have any further concerns or wish to schedule another appointment, please call our office at [phone] during normal business hours (8-5, M-F).      For uncomplicated pregnancies, you will be seeing your doctor once a month until you are 28 weeks, then you will see your doctor twice a month. You will begin weekly visits at 36 weeks until you deliver.      If you have urgent medical questions that cannot wait, you may call [phone] at any time of day.      If you have a medical emergency, please call 911.     When to call or come in to the hospital    If you notice a decrease in your baby's movement.     If your begin to experience contractions that are 5 minutes or less apart and lasting for over 45 seconds, or if contractions are becoming more painful.    If you have any bleeding or leakage of fluids.     If you have a headache not resolved with tylenol, right upper abdominal pain, or sudden onset of swelling.    You know your body best. Never hesitate to call or go to the hospital if something doesn't feel right!    [facility]  1. Address: [address] [place] [address]. [place] [address]  2. Phone: [phone]     Preparing for the hospital  You re likely feeling anxious as your child s birth approaches. This is normal. To give yourself some peace of mind, pack a bag 3-4 weeks before your due date. Here is a list of things to remember:    Personal care items like toothbrush, hair brush, lip balm, lotion, shampoo, glasses, contacts    Nightgown, bathrobe, slippers    Several pairs of underwear    Comfortable clothes for you to wear home    Camera with new batteries    Cell phone and     Insurance information and any other paperwork needed for your hospital stay    Clothes for baby to wear home    An infant, rear-facing car seat for bringing home your baby (this is required by law)    In case of an EMERGENCY or you START  TO FEEL CONTRACTIONS:  Once you start to feel contractions (I.e. increased abdominal discomfort/cramping, with increase frequency) and/or notice gush of water/blood coming from your vagina, PLEASE GO TO [facility] - LABOR AND DELIVERY.    My Pager sometimes [phone] --> REQUEST THE NURSING STAFF TO CALL ME (DR. MARIELENA AGUILAR) - THEY HAVE MY CELL NUMBER IN THE UNIT.  Marielena Aguilar MD, MPH (PGY 3)  [facility] Family Medicine Resident

## 2021-02-23 ENCOUNTER — AMBULATORY - HEALTHEAST (OUTPATIENT)
Dept: FAMILY MEDICINE | Facility: CLINIC | Age: 31
End: 2021-02-23

## 2021-02-23 NOTE — PROGRESS NOTES
Preceptor Attestation:  Patient's case reviewed and discussed with the resident, MARIELENA DOZIER MD, and I personally evaluated the patient. I agree with written assessment and plan of care.    Supervising Physician:  Neha Huang MD   [facility]

## 2021-02-25 NOTE — RESULT ENCOUNTER NOTE
Noted the viral load of >170,000,000 HBV NA international unit(s)/ml    As documented in my previous notes, patient was followed by Hepatology during recent prior pregnancy, was on Viread which was stopped at the end of pregnancy. Yo has decided to re-estalish care with Hepatology after her current pregnancy.    Plan:  -Post-partum establishment with Hepatology    Mich Aguilar MD, MPH (PGY 3)  [facility] Resident  Pager: [phone]

## 2021-02-26 ENCOUNTER — OFFICE VISIT (OUTPATIENT)
Dept: FAMILY MEDICINE | Facility: CLINIC | Age: 31
End: 2021-02-26
Payer: COMMERCIAL

## 2021-02-26 VITALS
DIASTOLIC BLOOD PRESSURE: 63 MMHG | HEIGHT: 60 IN | RESPIRATION RATE: 22 BRPM | WEIGHT: 143.2 LBS | SYSTOLIC BLOOD PRESSURE: 95 MMHG | OXYGEN SATURATION: 99 % | HEART RATE: 83 BPM | BODY MASS INDEX: 28.11 KG/M2 | TEMPERATURE: 97.9 F

## 2021-02-26 DIAGNOSIS — B18.1 VIRAL HEPATITIS B CHRONIC (H): ICD-10-CM

## 2021-02-26 DIAGNOSIS — O99.013 ANEMIA DURING PREGNANCY IN THIRD TRIMESTER: ICD-10-CM

## 2021-02-26 DIAGNOSIS — O34.219 VBAC (VAGINAL BIRTH AFTER CESAREAN): ICD-10-CM

## 2021-02-26 DIAGNOSIS — O09.899 SHORT INTERVAL BETWEEN PREGNANCIES AFFECTING PREGNANCY, ANTEPARTUM: Primary | ICD-10-CM

## 2021-02-26 DIAGNOSIS — O09.93 HIGH-RISK PREGNANCY IN THIRD TRIMESTER: ICD-10-CM

## 2021-02-26 PROCEDURE — 99207 PR PRENATAL VISIT: CPT | Mod: GC | Performed by: STUDENT IN AN ORGANIZED HEALTH CARE EDUCATION/TRAINING PROGRAM

## 2021-02-26 ASSESSMENT — MIFFLIN-ST. JEOR: SCORE: 1293.56

## 2021-02-26 NOTE — PROGRESS NOTES
I have reviewed the history and physical examination. I was present for key portions of the visit and agree with the assessment and plan as documented above by Dr. Aguilar for 30 yr old  @ 40 wks here for prenatal care. FHT/ FH appropriate.  Current issues include None.  /term labor precautions given.  Continue routine prenatal care.     Juan C Licea MD  2021  2:06 PM

## 2021-02-26 NOTE — PATIENT INSTRUCTIONS
[facility] Information- FAX NUMBER: [phone]  If you have any further concerns or wish to schedule another appointment, please call our office at [phone] during normal business hours (8-5, M-F).      For uncomplicated pregnancies, you will be seeing your doctor once a month until you are 28 weeks, then you will see your doctor twice a month. You will begin weekly visits at 36 weeks until you deliver.      If you have urgent medical questions that cannot wait, you may call [phone] at any time of day.      If you have a medical emergency, please call 911.     When to call or come in to the hospital    If you notice a decrease in your baby's movement.     If your begin to experience contractions that are 5 minutes or less apart and lasting for over 45 seconds, or if contractions are becoming more painful.    If you have any bleeding or leakage of fluids.     If you have a headache not resolved with tylenol, right upper abdominal pain, or sudden onset of swelling.    You know your body best. Never hesitate to call or go to the hospital if something doesn't feel right!    [facility]  1. Address: [address] [place] [address]. [place] [address]  2. Phone: [phone]     Preparing for the hospital  You re likely feeling anxious as your child s birth approaches. This is normal. To give yourself some peace of mind, pack a bag 3-4 weeks before your due date. Here is a list of things to remember:    Personal care items like toothbrush, hair brush, lip balm, lotion, shampoo, glasses, contacts    Nightgown, bathrobe, slippers    Several pairs of underwear    Comfortable clothes for you to wear home    Camera with new batteries    Cell phone and     Insurance information and any other paperwork needed for your hospital stay    Clothes for baby to wear home    An infant, rear-facing car seat for bringing home your baby (this is required by law)    In case of an EMERGENCY or you START  TO FEEL CONTRACTIONS:  Once you start to feel contractions (I.e. increased abdominal discomfort/cramping, with increase frequency) and/or notice gush of water/blood coming from your vagina, PLEASE GO TO [facility] - LABOR AND DELIVERY.    My Pager sometimes [phone] --> REQUEST THE NURSING STAFF TO CALL ME (DR. MARIELENA AGUILAR) - THEY HAVE MY CELL NUMBER IN THE UNIT.  Marielena Aguilar MD, MPH (PGY 3)  [facility] Family Medicine Resident

## 2021-02-26 NOTE — PROGRESS NOTES
HPI:      Yo Aragon is a 30 year old  (with previous twins in 2017 s/p C-sec) female with history of chronic hepatitis B during pregnancy (last pregnancy) and  in 2020 (at 40w6d) who presents to clinic for OB visit.  Her last menstrual period was 2020 (unsure). Estimated Date of Delivery: 2021 based on US performed at 17w6d.  Currently 40w0d based on second trimester US.     Chief Complaint   Patient presents with     Prenatal Care     Due today!  No concerns      Medication Reconciliation     Completed        Significant prenatal history:  -Hepatology for management of chronic hepatitis B during pregnancy: Followed by Hepatology during recent prior pregnancy, was on Viread which was stopped at the end of pregnancy. oY has decided to re-estalish care with Hepatology after her pregnancy  -Pap smear up to date, next pap due 2022  -Failed 1-hour; passed 3-hour  - during last pregnancy --> spoke with Dr. Acuna in 2020   -Expecting baby boy: declines circumcision   -Last cervix exam 21: 3-4cm/40% effacement/station -1 or 0/ mid position/consistency - moderately firm. Brown score of 6    Current concerns:  - Patient has no concerns. She feel baby move. Contractions are mild, not increasing in severity, and not consistent.   - No headaches, abdominal pain, leakage of fluids, bleeding or peripheral edema.  - Taking prenatal vitamin.   - Flu shot given 2020  -TDap given on 2021  -GBS negative (2021)    US OB on 2020  1.  Single living intrauterine gestation.  2.  Based on first ultrasound, composite age of 23 weeks 4 days  with EDC 2021 .  3.  Normal interval growth.  4.  Normal fetal survey.    Medication Reconciliation completed    Prenatal labs reviewed from 21:  HBV DNA Quant >170,000,000  Syphilis negative  GBS negative  CBC with hgb of 9.6; otherwise WNL.          Review of Systems:     C: NEGATIVE for fatigue, unexpected change in  weight  E: NEGATIVE for acute vision problems or changes  R: NEGATIVE for significant cough or shortness of breath  CV: NEGATIVE for chest pain, palpitations or new or worsening peripheral edema  P: NEGATIVE for changes in mood or affect          OBSTETRIC HISTORY:       OB History    Para Term  AB Living   4 3 3 0 0 4   SAB TAB Ectopic Multiple Live Births   0 0 0 1 4      # Outcome Date GA Lbr Yonas/2nd Weight Sex Delivery Anes PTL Lv   4 Current            3 Term 20 40w6d  3.771 kg (8 lb 5 oz) M  [facility]      Birth Comments: Complications: Nuchal, tight,delivered through      Complications: Nuchal cord affecting delivery      Name: Jeramy Marc      Apgar1: 8  Apgar5: 9   2A Term 17 38w0d  2.807 kg (6 lb 3 oz) M CS-LTranv Spinal N MARTHA      Name: GRACE AGGARWAL A-MT      Apgar1: 8  Apgar5: 9   2B Term 17 38w0d  2.67 kg (5 lb 14.2 oz) M CS-Unspec Spinal N MARTHA      Name: GRACE AGGARWAL B-MT      Apgar1: 9  Apgar5: 9   1 Term 13        MARTHA            PMHX:     Patient Active Problem List   Diagnosis     Anemia of pregnancy      delivery delivered     Viral hepatitis B chronic (H)     History of      High-risk pregnancy in second trimester     Short interval between pregnancies affecting pregnancy, antepartum       OB History    Para Term  AB Living   4 3 3 0 0 4   SAB TAB Ectopic Multiple Live Births   0 0 0 1 4      # Outcome Date GA Lbr Yonas/2nd Weight Sex Delivery Anes PTL Lv   4 Current            3 Term 20 40w6d  3.771 kg (8 lb 5 oz) M  [facility]      Birth Comments: Complications: Nuchal, tight,delivered through      Complications: Nuchal cord affecting delivery      Name: Jeramy Marc      Apgar1: 8  Apgar5: 9   2A Term 17 38w0d  2.807 kg (6 lb 3 oz) M CS-LTranv Spinal N MARTHA      Name: GRACE AGGARWAL A-MT      Apgar1: 8  Apgar5: 9   2B Term 17 38w0d  2.67 kg (5 lb 14.2 oz) M CS-Unspec Spinal N MARTHA      Name: JASEN,MALE  GUNNER      Apgar1: 9  Apgar5: 9   1 Term 09/07/13        MARTHA       Past Medical History:   Diagnosis Date     High-risk pregnancy in third trimester 12/30/2019     Viral hepatitis B chronic (H)        Current Outpatient Medications   Medication Sig Dispense Refill     ferrous sulfate (FEROSUL) 325 (65 Fe) MG tablet Take 1 tablet (325 mg) by mouth daily (with breakfast) 90 tablet 1     Prenatal Vit-Fe Fumarate-FA (PRENATAL MULTIVITAMIN W/IRON) 27-0.8 MG tablet Take 1 tablet by mouth daily 90 tablet 1       No results found for this or any previous visit (from the past 24 hour(s)).          Social HX:     Social History     Socioeconomic History     Marital status: Single     Spouse name: None     Number of children: None     Years of education: None     Highest education level: None   Occupational History     None   Social Needs     Financial resource strain: None     Food insecurity     Worry: None     Inability: None     Transportation needs     Medical: None     Non-medical: None   Tobacco Use     Smoking status: Passive Smoke Exposure - Never Smoker     Smokeless tobacco: Never Used     Tobacco comment: vapor exposure    Substance and Sexual Activity     Alcohol use: Not Currently     Frequency: Never     Drug use: Never     Sexual activity: None   Lifestyle     Physical activity     Days per week: None     Minutes per session: None     Stress: None   Relationships     Social connections     Talks on phone: None     Gets together: None     Attends Religion service: None     Active member of club or organization: None     Attends meetings of clubs or organizations: None     Relationship status: None     Intimate partner violence     Fear of current or ex partner: None     Emotionally abused: None     Physically abused: None     Forced sexual activity: None   Other Topics Concern     None   Social History Narrative     None     Have you used any tobacco products, alcohol or any other drugs during this pregnancy  "before or after your knew you were pregnant? No         Physical Exam:     Vitals:    21 1319   BP: 95/63   BP Location: Right arm   Patient Position: Sitting   Cuff Size: Adult Regular   Pulse: 83   Resp: 22   Temp: 97.9  F (36.6  C)   TempSrc: Oral   SpO2: 99%   Weight: 65 kg (143 lb 3.2 oz)   Height: 1.52 m (4' 11.84\")     Body mass index is 28.11 kg/m .    BP 95/63 (BP Location: Right arm, Patient Position: Sitting, Cuff Size: Adult Regular)   Pulse 83   Temp 97.9  F (36.6  C) (Oral)   Resp 22   Ht 1.52 m (4' 11.84\")   Wt 65 kg (143 lb 3.2 oz)   LMP 2020   SpO2 99%   BMI 28.11 kg/m     GENERAL:  Pleasant pregnant female, alert, well groomed.  SKIN:  Warm and dry, without lesions or rashes   EYES:  Pupils equal and round, EOM intact   LUNGS:  Clear to auscultation.   HEART:  RRR without murmur.   ABDOMEN: Soft without masses , tenderness or organomegaly.  No CVA tenderness. Well healed scar from  section. Fundus palpable above symphysis pubis ~41cm. FHT present  MUSCULOSKELETAL:  Full range of motion.   EXTREMITIES:  No edema. No significant varicosities.  GENITALIA:  Normal appearing anatomy, no lesions noted.  VAGINA:  Pink, normal rugae and discharge   CERVIX: 3-4cm/40% effacement/station -1 or 0/ mid position/consistency - moderately firm. Brown score of 6    Prenatal labs:   Hemoglobin   Date Value Ref Range Status   2021 9.6 (L) 11.7 - 15.7 g/dL Final     Assessment and Plan     (O09.899) Short interval between pregnancies affecting pregnancy, antepartum  (primary encounter diagnosis)  (O09.93) High-risk pregnancy in third trimester  (O34.219)  (vaginal birth after )  (B18.1) Viral hepatitis B chronic (H)  (O99.013) Anemia during pregnancy in third trimester     Yo Aragon is a 30 year old  female with history of chronic hepatitis B during pregnancy (last pregnancy) and  who presents to clinic for OB visit.  Her last menstrual period was 2020 " (unsure). Estimated Date of Delivery: 2021 based on US performed at 17w6d. Currently 40w0d based on second trimester US. GBS negative. No acute concerns.     Plan:  - Given cervix, expect labor will start this weekend. Advised her to come in as soon as her contractions increase in intensity, and or consistently <10 min apart since her last labor only lasted 1 hour.   -Spoke with Dr. Acuna about  2020. Reached out again to discuss post-dates plan of delivery, awaiting response. However, patient's cervix is progressing and will likely deliver without requiring induction of labor.   - Expecting baby boy - declines circumcision  -Provided counseling regarding labor symptoms, depression and social support systems, breast feeding, contraception options after delivery.   -Discussed preferences during labor: TOLAC, uncertain about epidural at this time.   -Reviewed potential interventions during labor including amniotomy, operative vaginal delivery and operative  delivery.   -The patient plans to deliver at [facility] with myself and OB on standby   -Patient will continue taking prenatal vitamins and avoiding cigarettes & alcohol  -Follow up 3/2/21 for routine prenatal visit, sooner if labor symptoms.   -Follow up 6 weeks post-partum.      Options for treatment and follow-up care were reviewed with the patient and/or guardian. Pt and/or guardian engaged in the decision making process and verbalized understanding of the options discussed and agreed with the final plan.     Precepted today with: MD Sherry Day, MS4     I was present with the medical student who participated in the service and in the documentation of the note. I have verified the history and personally performed the physical exam and medical decision making, and have verified the content of the note, which accurately reflects my assessment of the patient and the plan of care     Mich Aguilar MD, MPH (PGY  3)  [facility] Family Medicine Resident  Pager: [phone]

## 2021-02-27 ENCOUNTER — COMMUNICATION - HEALTHEAST (OUTPATIENT)
Dept: SCHEDULING | Facility: CLINIC | Age: 31
End: 2021-02-27

## 2021-03-02 ENCOUNTER — VIRTUAL VISIT (OUTPATIENT)
Dept: FAMILY MEDICINE | Facility: CLINIC | Age: 31
End: 2021-03-02
Payer: COMMERCIAL

## 2021-03-02 VITALS — WEIGHT: 135 LBS | HEIGHT: 59 IN | BODY MASS INDEX: 27.21 KG/M2

## 2021-03-02 PROCEDURE — 99213 OFFICE O/P EST LOW 20 MIN: CPT | Mod: 95 | Performed by: STUDENT IN AN ORGANIZED HEALTH CARE EDUCATION/TRAINING PROGRAM

## 2021-03-02 ASSESSMENT — MIFFLIN-ST. JEOR: SCORE: 1242.99

## 2021-03-02 NOTE — PROGRESS NOTES
Preceptor Attestation:   I was present during the phone visit and heard both sides of conversation and discussed with the resident. I have verified the content of the note, which accurately reflects my assessment of the patient and the plan of care.  Supervising Physician:Suzie Quiñonez MD  [facility]

## 2021-03-02 NOTE — PROGRESS NOTES
"Family Medicine Telephone Visit Note      Chief Complaint   Patient presents with     Vaginal Problem     Pt stated that she has been having clot discharge x3 Size of quarter to golf ball with cramping. Child birth 21     Medication Reconciliation     Completed          HPI   Patients name: Yo  Appointment start time:  11:54 AM    1. Blood clots: She is 3 days postpartum from a  . QBL was 500 ml. No stitches. She has been having cramping pain since delivery. She has had a faint trickle of vaginal bleeding however last night she passed a golf-ball sized clot. She then passed two more similarly sized clots this morning. She felt lightheaded and dizzy and cold when she passed those clots. She felt like she was going to pass out about 1 hour ago but then she took a nap and it improved. She is feeling very low energy and all she wants to do is sleep. She is also having back pain.     Current Outpatient Medications   Medication Sig Dispense Refill     ferrous sulfate (FEROSUL) 325 (65 Fe) MG tablet Take 1 tablet (325 mg) by mouth daily (with breakfast) 90 tablet 1     Prenatal Vit-Fe Fumarate-FA (PRENATAL MULTIVITAMIN W/IRON) 27-0.8 MG tablet Take 1 tablet by mouth daily 90 tablet 1     Allergies   Allergen Reactions     No Known Allergies           Review of Systems:     ROS otherwise negative unless stated above.         Physical Exam:     Ht 1.499 m (4' 11\")   Wt 61.2 kg (135 lb)   LMP 2020   BMI 27.27 kg/m    Estimated body mass index is 27.27 kg/m  as calculated from the following:    Height as of this encounter: 1.499 m (4' 11\").    Weight as of this encounter: 61.2 kg (135 lb).    Exam:  Constitutional: alert and no distress  Psychiatric: mentation appears normal and affect normal/bright        Assessment and Plan     1. Abnormal uterine bleeding, postpartum  She is symptomatic from this bleeding/clot passage. Recommended that if her symptoms continue or if she passes another clot that she " seek immediate medical evaluation at the hospital. She expressed understanding of these recommendations.    Appointment end time: 12:01 PM  This is a telephone visit that took 7 minutes.      Clinician location:  [facility]     Noelle Dalton MD  I precepted today with Dr. Quiñonez.

## 2021-03-03 ENCOUNTER — DOCUMENTATION ONLY (OUTPATIENT)
Dept: FAMILY MEDICINE | Facility: CLINIC | Age: 31
End: 2021-03-03

## 2021-03-03 NOTE — PROGRESS NOTES
Full term, , spontaneous delivery without complications, labor duration of 4 hours 54 mins. Tenmile male (Janice Marc), 8 lbs 3.9 oz, length- 21.5 inches, head- 34.3 cm. Pregnancy episode resolved. Mick RN

## 2021-03-15 ENCOUNTER — OFFICE VISIT (OUTPATIENT)
Dept: FAMILY MEDICINE | Facility: CLINIC | Age: 31
End: 2021-03-15
Payer: COMMERCIAL

## 2021-03-15 VITALS
BODY MASS INDEX: 23.15 KG/M2 | DIASTOLIC BLOOD PRESSURE: 64 MMHG | WEIGHT: 122.6 LBS | SYSTOLIC BLOOD PRESSURE: 98 MMHG | HEIGHT: 61 IN | RESPIRATION RATE: 18 BRPM | HEART RATE: 75 BPM | TEMPERATURE: 97.9 F | OXYGEN SATURATION: 100 %

## 2021-03-15 DIAGNOSIS — O09.93 HIGH-RISK PREGNANCY IN THIRD TRIMESTER: ICD-10-CM

## 2021-03-15 DIAGNOSIS — O99.013 ANEMIA DURING PREGNANCY IN THIRD TRIMESTER: ICD-10-CM

## 2021-03-15 PROCEDURE — 99207 PR PRENATAL VISIT: CPT | Mod: GC | Performed by: STUDENT IN AN ORGANIZED HEALTH CARE EDUCATION/TRAINING PROGRAM

## 2021-03-15 RX ORDER — FERROUS SULFATE 325(65) MG
325 TABLET ORAL
Qty: 90 TABLET | Refills: 1 | Status: SHIPPED | OUTPATIENT
Start: 2021-03-15

## 2021-03-15 RX ORDER — PRENATAL VIT/IRON FUM/FOLIC AC 27MG-0.8MG
1 TABLET ORAL DAILY
Qty: 90 TABLET | Refills: 1 | Status: SHIPPED | OUTPATIENT
Start: 2021-03-15

## 2021-03-15 ASSESSMENT — MIFFLIN-ST. JEOR: SCORE: 1210.55

## 2021-03-15 NOTE — PROGRESS NOTES
"    Assessment & Plan     Routine postpartum follow-up  Normal postpartum exam after .   -Pap smear: last one 2019 (next one 2022)  -Contraception: will discuss at the 6-week post-partum visit (2021)  - ferrous sulfate (FEROSUL) 325 (65 Fe) MG tablet  Dispense: 90 tablet; Refill: 1  - Prenatal Vit-Fe Fumarate-FA (PRENATAL MULTIVITAMIN W/IRON) 27-0.8 MG tablet  Dispense: 90 tablet; Refill: 1    Review of external notes as documented elsewhere in note      Return in about 4 weeks (around 2021).      Options for treatment and follow-up care were reviewed with the patient and/or guardian. Pt and/or guardian engaged in the decision making process and verbalized understanding of the options discussed and agreed with the final plan.    Precepted today with: MD Mich Jovel MD, MPH (PGY 3)  [facility] Family Medicine Resident  Pager: [phone] [facility]    Chet Ram is a 30 year old who presents for the following health issues     HPI     Yo is now  with history of chronic hepatitis B during pregnancy (last pregnancy), s/p low transverse c/s for twin gestation with transverse/transverse malpresentation (performed by Dr. DIDI Acuna in 2017) and had a  (delivery 3/17/2020) presented to labor with contractions. Had another successful  at 40w1d on 2021 at 0154 hrs, viable male. Currently doing well postpartum with no acute concerns      Admission Date: 2021  Delivery Date: 2021 1:54 AM   Gestational Age at Delivery: 40w1d  Discharge Date: 2021      Review of Systems   Constitutional, HEENT, cardiovascular, pulmonary, gi and gu systems are negative, except as otherwise noted.      Objective    BP 98/64   Pulse 75   Temp 97.9  F (36.6  C) (Oral)   Resp 18   Ht 1.537 m (5' 0.5\")   Wt 55.6 kg (122 lb 9.6 oz)   SpO2 100%   BMI 23.55 kg/m    Body mass index is 23.55 " kg/m .  Physical Exam   GENERAL: healthy, alert and no distress  RESP: lungs clear to auscultation - no rales, rhonchi or wheezes  CV: regular rate and rhythm, normal S1 S2, no S3 or S4, no murmur, click or rub, no peripheral edema and peripheral pulses strong  ABDOMEN: soft, nontender, no hepatosplenomegaly, no masses and bowel sounds normal  MS: no gross musculoskeletal defects noted, no edema  PSYCH: mentation appears normal, affect normal/bright      ----- Service Performed and Documented by Resident or Fellow ------

## 2021-04-17 ENCOUNTER — HEALTH MAINTENANCE LETTER (OUTPATIENT)
Age: 31
End: 2021-04-17

## 2021-05-31 VITALS — BODY MASS INDEX: 27.48 KG/M2 | WEIGHT: 140 LBS | HEIGHT: 60 IN

## 2021-06-02 VITALS — BODY MASS INDEX: 22.41 KG/M2 | WEIGHT: 114.75 LBS

## 2021-06-04 VITALS — BODY MASS INDEX: 26.61 KG/M2 | WEIGHT: 132 LBS | HEIGHT: 59 IN

## 2021-06-04 VITALS — BODY MASS INDEX: 25.08 KG/M2 | HEIGHT: 59 IN | WEIGHT: 124.4 LBS

## 2021-06-05 NOTE — PROGRESS NOTES
Faculty Supervision of Residents    I have examined this patient on 1/16/2020 and the medical care has been evaluated and discussed with the resident.  The documentation has been reviewed.  I agree with the medical care provided and confirm the findings.     Intermittent contractions all afternoon.  RUQ pain-intermittent.  Denies uterine tenderness or pain.  Bleeding earliet this am, but nothing since.    Abd: soft, nt, nd  Uterus: nontender    Abruption still in differential, but bleeding sounds like it could be hemorrhoidal.  Check an ultrasound and cont to monitor.  No need for steroids just yet unless cervical change or continued pain.  Jaison Irene

## 2021-06-05 NOTE — PROGRESS NOTES
Progress Note    Subjective  No return of vaginal bleeding. Feeling better after eating dinner. Did feel a couple contractions since returning from US which were mild.     Objective    Vital signs in last 24 hours   Temp:  [98.4  F (36.9  C)] 98.4  F (36.9  C)  Heart Rate:  [100] 100  Resp:  [16] 16  BP: (100)/(56) 100/56       Weight: 132 lb (59.9 kg)    Intake/Output last 3 shift   No intake/output data recorded.      Intake/Output this shift  No intake/output data recorded.    Physical Exam  GYN: per nursing exam, 1/10/-2    Pertinent Labs and Pertinent Radiology   Recent Results (from the past 24 hour(s))   Collect and send wet prep vaginal specimen as indicated by prenatal history and/or patient complaints   Result Value Ref Range    Yeast Result No yeast seen No yeast seen    Trichomonas No Trichomonas seen No Trichomonas seen    Clue Cells, Wet Prep Clue cells seen (!) No Clue cells seen   Urinalysis-UC if Indicated   Result Value Ref Range    Color, UA Yellow Colorless, Yellow, Straw, Light Yellow    Clarity, UA Clear Clear    Glucose, UA Negative Negative    Bilirubin, UA Negative Negative    Ketones, UA Negative Negative, 60 mg/dL    Specific Gravity, UA 1.005 1.001 - 1.030    Blood, UA Trace (!) Negative    pH, UA 7.0 4.5 - 8.0    Protein, UA Negative Negative mg/dL    Urobilinogen, UA <2.0 E.U./dL <2.0 E.U./dL, 2.0 E.U./dL    Nitrite, UA Negative Negative    Leukocytes, UA Negative Negative    Bacteria, UA Few (!) None Seen hpf    RBC, UA 0-2 None Seen, 0-2 hpf    WBC, UA 0-5 None Seen, 0-5 hpf    Squam Epithel, UA 25-50 (!) None Seen, 0-5 lpf   Rupture of Membrane Test or Screen   Result Value Ref Range    Rupture Fetal Membrane Negative Negative     EXAM: US OB LIMITED  LOCATION: [facility]  DATE/TIME: 1/16/2020 6:41 PM     INDICATION: Pain, bleeding  COMPARISON: Obstetric ultrasound 12/28/2019. EDC by first ultrasound is 03/07/2020  TECHNIQUE: Transabdominal     FINDINGS:  FETAL POSITION:  Cephalic  PLACENTA LOCATION: Anterior. No placenta previa  AMNIOTIC FLUID: Normal  FETAL HEART RATE: 139 bpm     Normal fetal movement was observed. Four-chamber heart, stomach, bladder, and kidneys were visualized     Cervical length: 3.4 cm  The ovaries were not visualized.     IMPRESSION:   1.  Single intrauterine gestation.   2.  Cervical length of 3.4 cm. No sonographic explanation for bleeding identified.    Assessment/Plan  29 year old  at 32w1d who presented with one episode of suspected vaginal bleeding which has since resolved. Unclear if bleeding actually vaginal or related to hemorrhoids given history of visible blood after straining to have a BM, and only minor pink-tinged fluid on cervical exam without blood in vault. No abdominal pain or evidence of placental pathology (including previa, decreased cervical length or abruption) on US. Intermittent contractions, which have spaced to every 5 min with no cervical change on exam and category 1 tracing. Uterine irritability possible in the setting of dehydration and BV so will have patient increase hydration and treat with oral flagyl. Strict return precautions discussed including recurrent bleeding, abdominal pain, or decreased fetal movement. She is scheduled for follow up in clinic tomorrow afternoon.     Gabrielle Ojeda DO  [facility] Family Medicine Resident  Pager# [phone]    Precepted patient with Dr. Heather Ramos.    This chart is completed utilizing dictation software; typos and/or incorrect word substitutions may unintentionally occur.

## 2021-06-05 NOTE — PROGRESS NOTES
Pt presents ambulatory to [facility] states that she wet to the backroom. Pt states she went to the bathroom , it was dry when I wipe, but when I stood up there was blood to my knees. Pt states blood was bright red. Pt states she has none since.  Pt states that she feel contractions about q 10 minutes which she rate as 1 on pain scale. Pt  States be lifting twins, but nothing unusual.  Pt states she hasn't had any difficultly with void. Pt does state that she is somewhat constipated and was straining.  Pt states she has not had intercourse in the last 24-48 hours. Pt placed on monitor. Pt states she has been drinking one cup of water and one cup of milk every day. Pt states she doesn't think her water broke. Pt reports positive fetal movement.   Dr Rusty villanueva returned page updated on pt, plan is to have resident to see pt.

## 2021-06-05 NOTE — PROGRESS NOTES
Dr Ojeda called and reviewed lab results. Prescription sent to patients pharmacy. Discharge orders obtained. Reviewed AVS with pt and answered all questions. Pt is to follow up in clinic tomorrow at her previously scheduled appointment. Pt discharged to home.

## 2021-06-05 NOTE — PROGRESS NOTES
OBSTETRICS TRIAGE ASSESSMENT NOTE  Yo Aragon is a 29 y.o.  at 32w1d gestation based on LMP and dating ultrasound with history of chronic hep B on tenofovir and iron deficiency anemia in pregnancy on iron supplements who has presented to maternity care for further evaluation of one episode of bleeding. No bleeding, leakage of fluids, contractions. Baby is moving normally.     CC: Bleeding    HPI: Patient states around 1040 this morning she was sitting on the toilet having a bowel movement.  She states that she was straining a little bit and has been constipated, currently not on any medications for constipations.  She states that she wiped and the tissue was dry.  She then stood up and felt fluid and looked down and there was one streak of bright red blood to her knees and 3 to 4 drops of blood on the floor.  She states that this time she felt mild pain in her vagina and patient believes this bleeding was from her vagina and not her rectum or urethra. Patient denies any blood streaked stools recently.  This is the first time this is happened and only occurred once.  She denies any recurrent bleeding since this morning. She denies any trauma and has only lifted her children without any other strenuous lifting. Patient denies any loss of fluid.  She states that she was initially having contractions when she presented to [facility] every 10 minutes but they have since resolved and is not having any contractions currently.  She reports good fetal movement.  She denies any history of hemorrhoids.  She last had intercourse on 12/10/2020 and her boyfriend has been in [place] for the past month.  She does endorse some greenish discharge that has been previously discussed at clinic and was told it was normal.  Patient states that she was told at her last ultrasound that she had a lot of amniotic fluid. Patient encouraged to drink fluids while here with resolution of ctx. Patient states she drinks 1 glass of milk and 1 glass  "of water a day.     PRENATAL CARE  Seen by Dr. Ojeda at [facility].    OB History        3    Para   2    Term   2            AB        Living   3       SAB        TAB        Ectopic        Multiple   1    Live Births   3                 PAST MEDICAL HISTORY  Chronic HBV    PAST SURGICAL HISTORY   Past Surgical History:   Procedure Laterality Date      SECTION N/A 2017    Procedure:  SECTION;  Surgeon: Debbie Sloan DO;  [facility];  Service:       SECTION, LOW TRANSVERSE         MEDICATIONS  Tenofovir 300 mg daily  Ferrous sulfate 325 mg daily  Prenatal viatmin    ALLERGIES:  NKDA    SOCIAL HISTORY:   EtOH: None  Tobacco: None  IV/RecDU: None  Lives with boyfriend, in a monogamous relationship.    FAMILY HISTORY:  No updates    PHYSICAL EXAMINATION   /56 (Patient Position: Semi-guerrero, Cuff Size: Adult Regular)   Pulse 100   Temp 98.4  F (36.9  C) (Oral)   Resp 16   Ht 4' 11\" (1.499 m)   Wt 132 lb (59.9 kg)   LMP 2019   BMI 26.66 kg/m    Gen: appears comfortable in no acute distress, lying in bed  HEENT: Atraumatic, normocephalic, conjunctiva non-injected, sclera anicteric, moist mucosal membranes  CV: regular rate and rhythm without murmur, gallops, or rubs.  Lungs: clear to ausculation, good air movement throughout, no wheezes, rales, or rhonchi  Abdomen: Gravid, non-tender, fundus at ~34. Bowel sounds present.  : No overt blood seen in vagina or at cervix, white copious discharge present with white hazy/clear fluid.   Rectum: External hemorrhoids seen at 6 o' clock position of rectum.  Cervix: 1 cm  Extremities: no lower extremity edema    FETAL HEART MONITORING   Category 1 strip    CONTRACTIONS  Monitor showing q 2-3 min, low amplitude, patient denying any currently.    LAB RESULTS  Personally reviewed.  Recent Results (from the past 24 hour(s))   Collect and send wet prep vaginal specimen as indicated by " prenatal history and/or patient complaints    Collection Time: 20  1:55 PM   Result Value Ref Range    Yeast Result No yeast seen No yeast seen    Trichomonas No Trichomonas seen No Trichomonas seen    Clue Cells, Wet Prep Clue cells seen (!) No Clue cells seen   Urinalysis-UC if Indicated    Collection Time: 20  1:55 PM   Result Value Ref Range    Color, UA Yellow Colorless, Yellow, Straw, Light Yellow    Clarity, UA Clear Clear    Glucose, UA Negative Negative    Bilirubin, UA Negative Negative    Ketones, UA Negative Negative, 60 mg/dL    Specific Gravity, UA 1.005 1.001 - 1.030    Blood, UA Trace (!) Negative    pH, UA 7.0 4.5 - 8.0    Protein, UA Negative Negative mg/dL    Urobilinogen, UA <2.0 E.U./dL <2.0 E.U./dL, 2.0 E.U./dL    Nitrite, UA Negative Negative    Leukocytes, UA Negative Negative    Bacteria, UA Few (!) None Seen hpf    RBC, UA 0-2 None Seen, 0-2 hpf    WBC, UA 0-5 None Seen, 0-5 hpf    Squam Epithel, UA 25-50 (!) None Seen, 0-5 lpf   Rupture of Membrane Test or Screen    Collection Time: 20  2:33 PM   Result Value Ref Range    Rupture Fetal Membrane Negative Negative       ASSESSMENT/PLAN:   29 y.o.  at 32w1d gestation presenting to labor & delivery for evaluation of bleeding likely due to hemorrhoids/BV. Unlikely to be abruption given no pain, single episode of bleeding, no trauma, and no overt bleeding in vaginal vault. Will defer US at this time. Unlikely to be previa as placenta is anterior on previous prenatal US.    1.Bacterial vaginosis  No overt blood seen on speculum exam. She also was found to have BV on wet prep today which can cause discharge, bleeding, and friable tissue. Patient was having ctx every 10 min [place] [facility] [place] has since resolved, but with vaginal discharge seen, sent a ROM plus which was negative.   - Flagyl gel intravaginally daily for 5 days  - Will monitor and recheck for any cervical changes, patient currently at 1 cm but is multip  and ctx have resolved with fluids.  - ROM plus: negative    2. Constipation in setting of external hemorrhoids   Most likely the cause of her single episode of bright red bleeding. 1 episode of overt bleeding this morning without recurrence and no overt blood seen on speculum exam, likely bleeding from hemorrhoids or BV. Patient has been constipated recently and was mildly straining on defecation with bleeding shortly afterwards.  - Counseled patient on adequate hydration  - May begin miralax prn    Follow up with PCP in clinic     Brittnee Steel MD  [facility], PGY-1  Pager # [phone]    Precepted patient with  Dr. Luca Irene.

## 2021-06-07 NOTE — ANESTHESIA PREPROCEDURE EVALUATION
Anesthesia Evaluation      Patient summary reviewed   No history of anesthetic complications     Airway   Mallampati: I  Neck ROM: full   Pulmonary - negative ROS and normal exam                          Cardiovascular - negative ROS and normal exam  Exercise tolerance: > or = 4 METS   Neuro/Psych - negative ROS     Endo/Other - negative ROS      GI/Hepatic/Renal - negative ROS      Other findings: Gave birth about 3 weeks ago, has mild Fe deficiency anemia. Chroni hepatits B. K 2.9 on admisiion, now 3.3.  GFR>60, Ca++ has dropped to 7.7., INR 1.24, Hg 10.3.      Dental - normal exam                        Anesthesia Plan  Planned anesthetic: general endotracheal    ASA 2   Induction: intravenous   Anesthetic plan and risks discussed with: patient  Anesthesia plan special considerations: rapid sequence induction, antiemetics,   Post-op plan: routine recovery

## 2021-06-07 NOTE — ANESTHESIA POSTPROCEDURE EVALUATION
Patient: Yo Aragon  Procedure(s):  APPENDECTOMY, LAPAROSCOPIC  Anesthesia type: general    Patient location: PACU  Last vitals:   Vitals Value Taken Time   /67 4/13/2020 12:30 PM   Temp 36.7  C (98.1  F) 4/13/2020 11:38 AM   Pulse 73 4/13/2020 12:40 PM   Resp 19 4/13/2020 12:40 PM   SpO2 100 % 4/13/2020 12:40 PM   Vitals shown include unvalidated device data.  Post vital signs: stable  Level of consciousness: somewhat drowsy, answers questions  Post-anesthesia pain: pain controlled  Post-anesthesia nausea and vomiting: no  Pulmonary: supplemental oxygen  Cardiovascular: stable and blood pressure at baseline  Hydration: adequate  Anesthetic events: no    QCDR Measures:  ASA# 11 - Ivy-op Cardiac Arrest: ASA11B - Patient did NOT experience unanticipated cardiac arrest  ASA# 12 - Ivy-op Mortality Rate: ASA12B - Patient did NOT die  ASA# 13 - PACU Re-Intubation Rate: ASA13B - Patient did NOT require a new airway mgmt  ASA# 10 - Composite Anes Safety: ASA10A - No serious adverse event    Additional Notes:

## 2021-06-07 NOTE — ANESTHESIA CARE TRANSFER NOTE
Last vitals:   Vitals:    04/13/20 1212   BP:    Pulse: 60   Resp: 15   Temp:    SpO2: 100%     Patient's level of consciousness is drowsy  Spontaneous respirations: yes  Maintains airway independently: yes  Dentition unchanged: yes  Oropharynx: oropharynx clear of all foreign objects    QCDR Measures:  ASA# 20 - Surgical Safety Checklist: WHO surgical safety checklist completed prior to induction    PQRS# 430 - Adult PONV Prevention: 4558F - Pt received => 2 anti-emetic agents (different classes) preop & intraop  ASA# 8 - Peds PONV Prevention: NA - Not pediatric patient, not GA or 2 or more risk factors NOT present  PQRS# 424 - Ivy-op Temp Management: 4559F - At least one body temp DOCUMENTED => 35.5C or 95.9F within required timeframe  PQRS# 426 - PACU Transfer Protocol: - Transfer of care checklist used  ASA# 14 - Acute Post-op Pain: ASA14B - Patient did NOT experience pain >= 7 out of 10

## 2021-06-13 NOTE — ANESTHESIA CARE TRANSFER NOTE
Last vitals:   Vitals:    11/07/17 0424   BP: 98/53   Pulse: 84   Resp: 12   Temp: 36.5  C (97.7  F)   SpO2: 98%     Patient's level of consciousness is drowsy  Spontaneous respirations: yes  Maintains airway independently: yes  Dentition unchanged: yes  Oropharynx: oropharynx clear of all foreign objects    QCDR Measures:  ASA# 20 - Surgical Safety Checklist: WHO surgical safety checklist completed prior to induction  PQRS# 430 - Adult PONV Prevention: 4558F - Pt received => 2 anti-emetic agents (different classes) preop & intraop  ASA# 8 - Peds PONV Prevention: NA - Not pediatric patient, not GA or 2 or more risk factors NOT present  PQRS# 424 - Ivy-op Temp Management: 4559F - At least one body temp DOCUMENTED => 35.5C or 95.9F within required timeframe  PQRS# 426 - PACU Transfer Protocol: - Transfer of care checklist used  ASA# 14 - Acute Post-op Pain: ASA14B - Patient did NOT experience pain >= 7 out of 10

## 2021-06-13 NOTE — ANESTHESIA PROCEDURE NOTES
Spinal Block    Patient location during procedure: OB  Start time: 11/7/2017 3:32 AM  End time: 11/7/2017 3:38 AM  Reason for block: at surgeon's request and primary anesthetic    Staffing:  Performing  Anesthesiologist: KANDI ELIZABETH    Preanesthetic Checklist  Completed: patient identified, risks, benefits, and alternatives discussed, timeout performed, consent obtained, airway assessed, oxygen available, suction available, emergency drugs available and hand hygiene performed  Spinal Block  Patient position: sitting  Prep: ChloraPrep and site prepped and draped  Patient monitoring: heart rate, cardiac monitor, continuous pulse ox and blood pressure  Approach: midline  Location: L3-4  Needle type: pencil-tip   Needle gauge: 25 G

## 2021-06-13 NOTE — ANESTHESIA PREPROCEDURE EVALUATION
Anesthesia Evaluation      Patient summary reviewed   No history of anesthetic complications     Airway   Mallampati: II  Neck ROM: full   Pulmonary - negative ROS and normal exam                          Cardiovascular - negative ROS and normal exam   Neuro/Psych - negative ROS     Endo/Other - negative ROS      GI/Hepatic/Renal - negative ROS           Dental - normal exam     Comment: Braces                         Anesthesia Plan  Planned anesthetic: spinal    ASA 2 - emergent     Anesthetic plan and risks discussed with: patient    Post-op plan: routine recovery

## 2021-06-13 NOTE — PROGRESS NOTES
Patient here for second dose of betamethasone.  Given, denies contractions.  No monitoring per drs order.

## 2021-06-14 NOTE — ANESTHESIA POSTPROCEDURE EVALUATION
Patient: Yo Aragon   SECTION  Anesthesia type: spinal    Patient location: Labor and Delivery  Last vitals:   Vitals:    17 0759   BP: 104/51   Pulse: 75   Resp: 18   Temp: 36.9  C (98.4  F)   SpO2: 98%     Post vital signs: stable  Level of consciousness: awake and responds to simple questions  Post-anesthesia pain: pain controlled  Post-anesthesia nausea and vomiting: no  Pulmonary: unassisted, return to baseline  Cardiovascular: stable and blood pressure at baseline  Hydration: adequate  Anesthetic events: no    QCDR Measures:  ASA# 11 - Ivy-op Cardiac Arrest: ASA11B - Patient did NOT experience unanticipated cardiac arrest  ASA# 12 - Ivy-op Mortality Rate: ASA12B - Patient did NOT die  ASA# 13 - PACU Re-Intubation Rate: NA - No ETT / LMA used for case  ASA# 10 - Composite Anes Safety: ASA10A - No serious adverse event    Additional Notes:

## 2021-06-15 NOTE — CONFIDENTIAL NOTE
This is for documentation purposes    Yo has been followed by [facility]: she is  a 30 year old  female with history of chronic hepatitis B during pregnancy (last pregnancy), s/p low transverse c/s for twin gestation with transverse/transverse malpresentation (performed by Dr. DIDI Acuna in 2017) and had a  (delivery 3/17/2020). She started her prenatal care in 2020 and per chart review (and according to the patient) she spoke to Dr. DIDI Acuna in 2020 regarding TOLAC for the current pregnancy.    I saw the patient yesterday (2021) and she was 39w3d with JUDY 2021 based on US performed at 17w6d. GBS negative. Attempted membrane stripping and cervix was 1-2cm/0% effacement/-3 station    Routed message to Dr. DIDI Acuna regarding recommendations for delivery plan:  -Patient would like an induction. Though, I'm concerned about an induction in view of prior history of C/sec.    Plan  -Will await OB/Gyn recommendations  -Patient is aware of the above  -Anticipate  with close monitoring    Mich Aguilar MD, MPH (PGY 3)  [facility] Resident  Pager: [phone]  2021  7:26 PM

## 2021-06-21 NOTE — PROGRESS NOTES
Assessment/ Plan  1. Bleeding in early pregnancy  Cervix, at least internally,closed  Too early for fetal heart tone  No significant pain  No sign of sepsis/systemic illness  Blood type Rh+    Beta hCG pending  Ultrasound tonight    Follow-up with results by phone in the morning    Fairly extensive time spent with family discussing possibilities.    Addendum   Patient's ultrasound showed intrauterine pregnancy 5 weeks 3 days, discordant for dates based on LMP  Beta-hCG 8493 fits with 6-7-week pregnancy.  She continues to bleed lightly    Discussed that this may fit with pregnancy loss/threatened miscarriage  Recommend follow-up beta hCG on Monday as next step.  I will plan on contacting patient on Tuesday.  Patient did complain of some lightheadedness today.    Mildly low blood pressure.    Encouraged fluid intake.  Doubt underlying catastrophic illness, no sign of massive hemorrhage, infection, DIC/etc.   HM2(CBC w/o Differential)  - INR  - Beta-hCG, Quantitative    e    Body mass index is 22.41 kg/(m^2).    Subjective  CC:  Chief Complaint   Patient presents with     Vag Bleeding During Pregnancy     pt states she is 7 wks pregnant. 2 days ago had brown color spotting, this morning she had heavy pink/red blood, now it has stopped      HPI:  Problem: vaginal bleeding in 1st trimester  Narrative- vaginal bleeding in early pregnancy  2 days ago had brown spotting  Earlier today had  Oklee discharge  No pain  Twins     Mild cramping  2 previous-   Pt with o + blood      Patient Active Problem List   Diagnosis     Twin pregnancy      delivery delivered     Current medications reviewed as follows:  Current Outpatient Prescriptions on File Prior to Visit   Medication Sig     [DISCONTINUED] ferrous sulfate 325 (65 FE) MG tablet Take 1 tablet (325 mg total) by mouth daily with breakfast.     [DISCONTINUED] ibuprofen (ADVIL,MOTRIN) 600 MG tablet Take 1 tablet (600 mg total) by mouth every 6 (six) hours.      [DISCONTINUED] oxyCODONE-acetaminophen (PERCOCET) 5-325 mg per tablet Take 1-2 tablets by mouth every 4 (four) hours as needed.     [DISCONTINUED] prenatal vitamin iron-folic acid 27mg-0.8mg (PRENATAL S) 27 mg iron- 800 mcg Tab tablet Take 1 tablet by mouth daily.     No current facility-administered medications on file prior to visit.      History   Smoking Status     Never Smoker   Smokeless Tobacco     Never Used     Social History     Social History Narrative     Patient Care Team:  Cherelle Acuna MD as PCP - General (Obstetrics and Gynecology)  ROS  Full 10 system review including constitutional, respiratory, cardiac, gi, urinary, rheumatologic, neurologic, reproductive, dermatologic psychiatric is  performed  and is otherwise negative         Objective  Physical Exam  Vitals:    11/08/18 1513   BP: (!) 88/48   Patient Site: Left Arm   Patient Position: Sitting   Cuff Size: Adult Regular   Pulse: 72   Resp: 16   Weight: 114 lb 12 oz (52.1 kg)   Abdomen soft with no significant tenderness  External genitalia appear normal.  Normal labia minora and majora.  No lesion.  Speculum used to examine vaginal vault which has a normal appearance with physiologic discharge.  Cervix well visualized, mild cervical bleeding, attempted to pass ring forceps and it will enter the external loss but not internal  Patient is pleasant, no acute distress, good color  Diagnostics  Results for orders placed or performed in visit on 11/08/18   Pregnancy (Beta-hCG, Qual), Urine   Result Value Ref Range    Pregnancy Test, Urine Positive (!) Negative   HM2(CBC w/o Differential)   Result Value Ref Range    WBC 4.6 4.0 - 11.0 thou/uL    RBC 5.13 3.80 - 5.40 mill/uL    Hemoglobin 13.3 12.0 - 16.0 g/dL    Hematocrit 41.6 35.0 - 47.0 %    MCV 81 80 - 100 fL    MCH 26.0 (L) 27.0 - 34.0 pg    MCHC 32.0 32.0 - 36.0 g/dL    RDW 13.0 11.0 - 14.5 %    Platelets 227 140 - 440 thou/uL    MPV 9.2 7.0 - 10.0 fL         Please note: Voice recognition  software was used in this dictation.  It may therefore contain typographical errors.

## 2021-07-03 NOTE — ADDENDUM NOTE
Addendum Note by Jaylan Salinas MD at 11/9/2018  8:47 AM     Author: Jaylan Salinas MD Service: -- Author Type: Physician    Filed: 11/9/2018  8:47 AM Encounter Date: 11/8/2018 Status: Signed    : Jaylan Salinas MD (Physician)    Addended by: JAYLAN SALINAS on: 11/9/2018 08:47 AM        Modules accepted: Orders

## 2021-10-02 ENCOUNTER — HEALTH MAINTENANCE LETTER (OUTPATIENT)
Age: 31
End: 2021-10-02

## 2022-05-14 ENCOUNTER — HEALTH MAINTENANCE LETTER (OUTPATIENT)
Age: 32
End: 2022-05-14

## 2022-05-31 DIAGNOSIS — Z11.59 ENCOUNTER FOR SCREENING FOR OTHER VIRAL DISEASES: Primary | ICD-10-CM

## 2022-06-01 ENCOUNTER — NURSE TRIAGE (OUTPATIENT)
Dept: NURSING | Facility: CLINIC | Age: 32
End: 2022-06-01
Payer: COMMERCIAL

## 2022-06-01 NOTE — CONFIDENTIAL NOTE
Patient is calling and says a message was left for her to call the triage line  No message left in Epic other than an order placed for patient to take a Covid-19 PCR test  Caller verbalized and understands directives  COVID 19 Nurse Triage Plan/Patient Instructions    Please be aware that novel coronavirus (COVID-19) may be circulating in the community. If you develop symptoms such as fever, cough, or SOB or if you have concerns about the presence of another infection including coronavirus (COVID-19), please contact your health care provider or visit [email or web address].     Disposition/Instructions    In-Person Visit with provider recommended. Reference Visit Selection Guide.    Thank you for taking steps to prevent the spread of this virus.  o Limit your contact with others.  o Wear a simple mask to cover your cough.  o Wash your hands well and often.    Resources    [facility]: About COVID-19: [email or web address]    CDC: What to Do If You're Sick: www.cdc.gov/coronavirus/2019-ncov/about/steps-when-sick.html    CDC: Ending Home Isolation: www.cdc.gov/coronavirus/2019-ncov/hcp/disposition-in-home-patients.html     CDC: Caring for Someone: www.cdc.gov/coronavirus/2019-ncov/if-you-are-sick/care-for-someone.html     [facility]: Interim Guidance for Hospital Discharge to Home: www.health.[email or web address]/diseases/coronavirus/hcp/hospdischarge.pdf    [facility] clinical trials (COVID-19 research studies): [email or web address].[email or web address]/umn-clinical-trials     Below are the COVID-19 hotlines at the [place] Department of Health ([facility]). Interpreters are available.   o For health questions: Call 622-232-2761 or 1-309.581.2483 (7 a.m. to 7 p.m.)  o For questions about schools and childcare: Call 601-658-4859 or 1-112.463.4645 (7 a.m. to 7 p.m.)

## 2022-06-05 ENCOUNTER — LAB (OUTPATIENT)
Dept: LAB | Facility: CLINIC | Age: 32
End: 2022-06-05
Attending: OBSTETRICS & GYNECOLOGY
Payer: COMMERCIAL

## 2022-06-05 DIAGNOSIS — Z11.59 ENCOUNTER FOR SCREENING FOR OTHER VIRAL DISEASES: ICD-10-CM

## 2022-06-05 PROCEDURE — U0003 INFECTIOUS AGENT DETECTION BY NUCLEIC ACID (DNA OR RNA); SEVERE ACUTE RESPIRATORY SYNDROME CORONAVIRUS 2 (SARS-COV-2) (CORONAVIRUS DISEASE [COVID-19]), AMPLIFIED PROBE TECHNIQUE, MAKING USE OF HIGH THROUGHPUT TECHNOLOGIES AS DESCRIBED BY CMS-2020-01-R: HCPCS

## 2022-06-05 PROCEDURE — U0005 INFEC AGEN DETEC AMPLI PROBE: HCPCS

## 2022-06-07 LAB — SARS-COV-2 RNA RESP QL NAA+PROBE: NEGATIVE

## 2022-06-08 ENCOUNTER — ANESTHESIA EVENT (OUTPATIENT)
Dept: SURGERY | Facility: AMBULATORY SURGERY CENTER | Age: 32
End: 2022-06-08
Payer: COMMERCIAL

## 2022-06-09 ENCOUNTER — ANESTHESIA (OUTPATIENT)
Dept: SURGERY | Facility: AMBULATORY SURGERY CENTER | Age: 32
End: 2022-06-09
Payer: COMMERCIAL

## 2022-06-09 ENCOUNTER — HOSPITAL ENCOUNTER (OUTPATIENT)
Facility: AMBULATORY SURGERY CENTER | Age: 32
Discharge: HOME | End: 2022-06-09
Attending: OBSTETRICS & GYNECOLOGY
Payer: COMMERCIAL

## 2022-06-09 VITALS
DIASTOLIC BLOOD PRESSURE: 57 MMHG | HEART RATE: 85 BPM | RESPIRATION RATE: 16 BRPM | TEMPERATURE: 97 F | SYSTOLIC BLOOD PRESSURE: 106 MMHG | OXYGEN SATURATION: 100 %

## 2022-06-09 DIAGNOSIS — N92.6 IRREGULAR PERIODS: ICD-10-CM

## 2022-06-09 LAB
HCG UR QL: NEGATIVE
INTERNAL QC OK POCT: NORMAL
POCT KIT EXPIRATION DATE: NORMAL
POCT KIT LOT NUMBER: NORMAL

## 2022-06-09 RX ORDER — SODIUM CHLORIDE, SODIUM LACTATE, POTASSIUM CHLORIDE, CALCIUM CHLORIDE 600; 310; 30; 20 MG/100ML; MG/100ML; MG/100ML; MG/100ML
INJECTION, SOLUTION INTRAVENOUS CONTINUOUS
Status: DISCONTINUED | OUTPATIENT
Start: 2022-06-09 | End: 2022-06-10 | Stop reason: HOSPADM

## 2022-06-09 RX ORDER — ACETAMINOPHEN 325 MG/1
975 TABLET ORAL ONCE
Status: DISCONTINUED | OUTPATIENT
Start: 2022-06-09 | End: 2022-06-10 | Stop reason: HOSPADM

## 2022-06-09 RX ORDER — ONDANSETRON 2 MG/ML
INJECTION INTRAMUSCULAR; INTRAVENOUS PRN
Status: DISCONTINUED | OUTPATIENT
Start: 2022-06-09 | End: 2022-06-09

## 2022-06-09 RX ORDER — MEPERIDINE HYDROCHLORIDE 25 MG/ML
12.5 INJECTION INTRAMUSCULAR; INTRAVENOUS; SUBCUTANEOUS
Status: DISCONTINUED | OUTPATIENT
Start: 2022-06-09 | End: 2022-06-10 | Stop reason: HOSPADM

## 2022-06-09 RX ORDER — IBUPROFEN 800 MG/1
800 TABLET, FILM COATED ORAL EVERY 6 HOURS PRN
Qty: 30 TABLET | Refills: 0 | Status: ON HOLD | OUTPATIENT
Start: 2022-06-09 | End: 2023-10-27

## 2022-06-09 RX ORDER — KETOROLAC TROMETHAMINE 30 MG/ML
INJECTION, SOLUTION INTRAMUSCULAR; INTRAVENOUS PRN
Status: DISCONTINUED | OUTPATIENT
Start: 2022-06-09 | End: 2022-06-09

## 2022-06-09 RX ORDER — LIDOCAINE HYDROCHLORIDE 10 MG/ML
INJECTION, SOLUTION EPIDURAL; INFILTRATION; INTRACAUDAL; PERINEURAL PRN
Status: DISCONTINUED | OUTPATIENT
Start: 2022-06-09 | End: 2022-06-09 | Stop reason: HOSPADM

## 2022-06-09 RX ORDER — OXYCODONE HYDROCHLORIDE 5 MG/1
5 TABLET ORAL EVERY 4 HOURS PRN
Status: DISCONTINUED | OUTPATIENT
Start: 2022-06-09 | End: 2022-06-10 | Stop reason: HOSPADM

## 2022-06-09 RX ORDER — PROPOFOL 10 MG/ML
INJECTION, EMULSION INTRAVENOUS CONTINUOUS PRN
Status: DISCONTINUED | OUTPATIENT
Start: 2022-06-09 | End: 2022-06-09

## 2022-06-09 RX ORDER — IBUPROFEN 800 MG/1
800 TABLET, FILM COATED ORAL ONCE
Status: DISCONTINUED | OUTPATIENT
Start: 2022-06-09 | End: 2022-06-10 | Stop reason: HOSPADM

## 2022-06-09 RX ORDER — FENTANYL CITRATE 0.05 MG/ML
25 INJECTION, SOLUTION INTRAMUSCULAR; INTRAVENOUS EVERY 5 MIN PRN
Status: DISCONTINUED | OUTPATIENT
Start: 2022-06-09 | End: 2022-06-10 | Stop reason: HOSPADM

## 2022-06-09 RX ORDER — ONDANSETRON 2 MG/ML
4 INJECTION INTRAMUSCULAR; INTRAVENOUS EVERY 30 MIN PRN
Status: DISCONTINUED | OUTPATIENT
Start: 2022-06-09 | End: 2022-06-10 | Stop reason: HOSPADM

## 2022-06-09 RX ORDER — ONDANSETRON 4 MG/1
4 TABLET, ORALLY DISINTEGRATING ORAL EVERY 30 MIN PRN
Status: DISCONTINUED | OUTPATIENT
Start: 2022-06-09 | End: 2022-06-10 | Stop reason: HOSPADM

## 2022-06-09 RX ORDER — LIDOCAINE 40 MG/G
CREAM TOPICAL
Status: DISCONTINUED | OUTPATIENT
Start: 2022-06-09 | End: 2022-06-10 | Stop reason: HOSPADM

## 2022-06-09 RX ORDER — FENTANYL CITRATE 50 UG/ML
INJECTION, SOLUTION INTRAMUSCULAR; INTRAVENOUS PRN
Status: DISCONTINUED | OUTPATIENT
Start: 2022-06-09 | End: 2022-06-09

## 2022-06-09 RX ORDER — DEXAMETHASONE SODIUM PHOSPHATE 4 MG/ML
INJECTION, SOLUTION INTRA-ARTICULAR; INTRALESIONAL; INTRAMUSCULAR; INTRAVENOUS; SOFT TISSUE PRN
Status: DISCONTINUED | OUTPATIENT
Start: 2022-06-09 | End: 2022-06-09

## 2022-06-09 RX ORDER — LIDOCAINE HYDROCHLORIDE 20 MG/ML
INJECTION, SOLUTION INFILTRATION; PERINEURAL PRN
Status: DISCONTINUED | OUTPATIENT
Start: 2022-06-09 | End: 2022-06-09

## 2022-06-09 RX ORDER — HYDROMORPHONE HCL IN WATER/PF 6 MG/30 ML
0.2 PATIENT CONTROLLED ANALGESIA SYRINGE INTRAVENOUS EVERY 5 MIN PRN
Status: DISCONTINUED | OUTPATIENT
Start: 2022-06-09 | End: 2022-06-10 | Stop reason: HOSPADM

## 2022-06-09 RX ORDER — FENTANYL CITRATE 0.05 MG/ML
25 INJECTION, SOLUTION INTRAMUSCULAR; INTRAVENOUS
Status: DISCONTINUED | OUTPATIENT
Start: 2022-06-09 | End: 2022-06-10 | Stop reason: HOSPADM

## 2022-06-09 RX ADMIN — DEXAMETHASONE SODIUM PHOSPHATE 4 MG: 4 INJECTION, SOLUTION INTRA-ARTICULAR; INTRALESIONAL; INTRAMUSCULAR; INTRAVENOUS; SOFT TISSUE at 10:46

## 2022-06-09 RX ADMIN — ONDANSETRON 4 MG: 2 INJECTION INTRAMUSCULAR; INTRAVENOUS at 10:46

## 2022-06-09 RX ADMIN — KETOROLAC TROMETHAMINE 15 MG: 30 INJECTION, SOLUTION INTRAMUSCULAR; INTRAVENOUS at 11:04

## 2022-06-09 RX ADMIN — LIDOCAINE HYDROCHLORIDE 40 MG: 20 INJECTION, SOLUTION INFILTRATION; PERINEURAL at 10:41

## 2022-06-09 RX ADMIN — SODIUM CHLORIDE, SODIUM LACTATE, POTASSIUM CHLORIDE, CALCIUM CHLORIDE: 600; 310; 30; 20 INJECTION, SOLUTION INTRAVENOUS at 10:12

## 2022-06-09 RX ADMIN — PROPOFOL 200 MCG/KG/MIN: 10 INJECTION, EMULSION INTRAVENOUS at 10:41

## 2022-06-09 RX ADMIN — FENTANYL CITRATE 50 MCG: 50 INJECTION, SOLUTION INTRAMUSCULAR; INTRAVENOUS at 10:41

## 2022-06-09 NOTE — ANESTHESIA POSTPROCEDURE EVALUATION
Patient: Yo Aragon    Procedure: Procedure(s):  HYSTEROSCOPY, WITH DILATION AND CURETTAGE OF UTERUS       Anesthesia Type:  MAC    Note:  Disposition: Outpatient   Postop Pain Control: Uneventful            Sign Out: Well controlled pain   PONV: No   Neuro/Psych: Uneventful            Sign Out: Acceptable/Baseline neuro status   Airway/Respiratory: Uneventful            Sign Out: Acceptable/Baseline resp. status   CV/Hemodynamics: Uneventful            Sign Out: Acceptable CV status; No obvious hypovolemia; No obvious fluid overload   Other NRE: NONE   DID A NON-ROUTINE EVENT OCCUR? No           Last vitals:  Vitals Value Taken Time   /57 06/09/22 1145   Temp 97  F (36.1  C) 06/09/22 1111   Pulse 87 06/09/22 1148   Resp 16 06/09/22 1111   SpO2 100 % 06/09/22 1148   Vitals shown include unvalidated device data.    Electronically Signed By: Teodoro Grove MD  June 9, 2022  12:10 PM

## 2022-06-09 NOTE — H&P
[facility]    History and Physical       Date of Admission:  2022    History of Present Illness   Yo Aggarwal is a 32 year old female  with thickened endometrium and concern for endometrial polyp here for surgical mangement.     Past Medical History    Past Medical History:   Diagnosis Date     High-risk pregnancy in third trimester 2019     Viral hepatitis B chronic (H)        Past Surgical History   Past Surgical History:   Procedure Laterality Date     APPENDECTOMY  2020     C/SECTION, LOW TRANSVERSE        SECTION N/A 2017    Procedure:  SECTION;  Surgeon: Debbie Sloan DO;  Location: [facility] OR;  Service:      [place] LAP,APPENDECTOMY N/A 2020    Procedure: APPENDECTOMY, LAPAROSCOPIC;  Surgeon: Jeramy Gonzales MD;  Location: [facility] OR;  Service: General       OB History    Para Term  AB Living   4 4 4 0 0 5   SAB IAB Ectopic Multiple Live Births   0 0 0 1 5      # Outcome Date GA Lbr Yonas/2nd Weight Sex Delivery Anes PTL Lv   4 Term 21 40w1d  3.739 kg (8 lb 3.9 oz) M  None N MARTHA      Name: Janice Marc      Apgar1: 9  Apgar5: 9   3 Term 20 40w6d  3.771 kg (8 lb 5 oz) M  IV N MARTHA      Birth Comments: Complications: Nuchal, tight,delivered through      Complications: Nuchal cord affecting delivery      Name: Jeramy Monico      Apgar1: 8  Apgar5: 9   2A Term 17 38w0d  2.807 kg (6 lb 3 oz) M CS-LTranv Spinal N MARTHA      Name: GRACE AGGARWAL      Apgar1: 8  Apgar5: 9   2B Term 17 38w0d  2.67 kg (5 lb 14.2 oz) M CS-Unspec Spinal N MARTHA      Name: JASENMALE GUNNER      Apgar1: 9  Apgar5: 9   1 Term 13        MARTHA      Social History   Social History     Tobacco Use     Smoking status: Passive Smoke Exposure - Never Smoker     Smokeless tobacco: Never Used     Tobacco comment: vapor exposure    Substance Use Topics     Alcohol use: Not Currently     Drug use: Never      Family History   Family History    Problem Relation Age of Onset     Family History Negative No family hx of      Cancer No family hx of      Heart Disease No family hx of      Coronary Artery Disease No family hx of      Diabetes No family hx of      Hypertension No family hx of         Prior to Admission Medications   Cannot display prior to admission medications because the patient has not been admitted in this contact.     Allergies   Allergies   Allergen Reactions     No Known Allergies        Physical Exam   Vital Signs with Ranges  Temp:  [97.9  F (36.6  C)] 97.9  F (36.6  C)  Pulse:  [68] 68  Resp:  [16] 16  BP: (101)/(68) 101/68    Gen: no acute distress, resting comfortably   CV: acyanotic   Heart: regular rate and rhythm   Pulm: unlabored respirations, clear to ausculation bilaterally    Abd: gravid, soft, nontender   Extremities: soft, nontender     Assessment & Plan   Yo Aragon is a 32 year old female with thickened endometrium and concern for an endometrial polyp  -- To OR for hysteroscopy, D+C and polypectomy.   -- Risks and benefits discussed including bleeding, infection, uterine perforation and need for further surgery. Questions answered. Consent signed.    Margi Feng MD

## 2022-06-09 NOTE — DISCHARGE INSTRUCTIONS
If you have any questions or concerns regarding your procedure, please contact ROHIT Barr, her office number is [phone].    You received a medication called Toradol (a stronger IV ibuprofen) at 11 AM. Do NOT take any Ibuprofen / Advil / Motrin / Aleve / Naproxen or products containing Ibuprofen until 5 PM or later.      Discharge Instructions: After Your Surgery  You ve just had surgery. During surgery, you were given medicine called anesthesia to keep you relaxed and free of pain. After surgery, you may have some pain or nausea. This is common. Here are some tips for feeling better and getting well after surgery.     Stay on schedule with your medicine.     Going home  Your healthcare provider will show you how to take care of yourself when you go home. He or she will also answer your questions. Have an adult family member or friend drive you home. For the first 24 hours after your surgery:  Don't drive or use heavy equipment.  Don't make important decisions or sign legal papers.  Don't drink alcohol.  Have someone stay with you, if needed. He or she can watch for problems and help keep you safe.  Be sure to go to all follow-up visits with your healthcare provider. And rest after your surgery for as long as your healthcare provider tells you to.    Coping with pain  If you have pain after surgery, pain medicine will help you feel better. Take it as told, before pain becomes severe. Also, ask your healthcare provider or pharmacist about other ways to control pain. This might be with heat, ice, or relaxation. And follow any other instructions your surgeon or nurse gives you.    Tips for taking pain medicine  To get the best relief possible, remember these points:  Pain medicines can upset your stomach. Taking them with a little food may help.  Most pain relievers taken by mouth need at least 20 to 30 minutes to start to work.  Don't wait till your pain becomes severe before you take your medicine. Try to  time your medicine so that you can take it before starting an activity. This might be before you get dressed, go for a walk, or sit down for dinner.  Constipation is a common side effect of pain medicines. Call your healthcare provider before taking any medicines such as laxatives or stool softeners to help ease constipation. Also ask if you should skip any foods. Drinking lots of fluids and eating foods such as fruits and vegetables that are high in fiber can also help. Remember, don't take laxatives unless your surgeon has prescribed them.  Drinking alcohol and taking pain medicine can cause dizziness and slow your breathing. It can even be deadly. Don't drink alcohol while taking pain medicine.  Pain medicine can make you react more slowly to things. Don't drive or run machinery while taking pain medicine.  Your healthcare provider may tell you to take acetaminophen to help ease your pain. Ask him or her how much you are supposed to take each day. Acetaminophen or other pain relievers may interact with your prescription medicines or other over-the-counter (OTC) medicines. Some prescription medicines have acetaminophen and other ingredients. Using both prescription and OTC acetaminophen for pain can cause you to overdose. Read the labels on your OTC medicines with care. This will help you to clearly know the list of ingredients, how much to take, and any warnings. It may also help you not take too much acetaminophen. If you have questions or don't understand the information, ask your pharmacist or healthcare provider to explain it to you before you take the OTC medicine.    Managing nausea  Some people have an upset stomach after surgery. This is often because of anesthesia, pain, or pain medicine, or the stress of surgery. These tips will help you handle nausea and eat healthy foods as you get better. If you were on a special food plan before surgery, ask your healthcare provider if you should follow it while you  get better. These tips may help:  Don't push yourself to eat. Your body will tell you when to eat and how much.  Start off with clear liquids and soup. They are easier to digest.  Next try semi-solid foods, such as mashed potatoes, applesauce, and gelatin, as you feel ready.  Slowly move to solid foods. Don t eat fatty, rich, or spicy foods at first.  Don't force yourself to have 3 large meals a day. Instead eat smaller amounts more often.  Take pain medicines with a small amount of solid food, such as crackers or toast, to prevent nausea.    When to call your healthcare provider  Call your healthcare provider if:  You still have intolerable pain an hour after taking medicine. The medicine may not be strong enough.  You feel too sleepy, dizzy, or groggy. The medicine may be too strong.  You have side effects such as nausea or vomiting, or skin changes such as rash, itching, or hives. Your healthcare provider may suggest other medicines to control side effects.  Rash, itching, or hives may mean you have an allergic reaction. Report this right away. If you have trouble breathing or facial swelling, call 911 right away.    If you have obstructive sleep apnea  You were given anesthesia medicine during surgery to keep you comfortable and free of pain. After surgery, you may have more apnea spells because of this medicine and other medicines you were given. The spells may last longer than usual.   At home:  Keep using the continuous positive airway pressure (CPAP) device when you sleep. Unless your healthcare provider tells you not to, use it when you sleep, day or night. CPAP is a common device used to treat obstructive sleep apnea.  Talk with your provider before taking any pain medicine, muscle relaxants, or sedatives. Your provider will tell you about the possible dangers of taking these medicines.  [employer] last reviewed this educational content on 3/1/2019    7090-9835 [employer]. All rights reserved.  This information is not intended as a substitute for professional medical care. Always follow your healthcare professional's instructions.

## 2022-06-09 NOTE — ANESTHESIA CARE TRANSFER NOTE
Patient: Yo Aragon    Procedure: Procedure(s):  HYSTEROSCOPY, WITH DILATION AND CURETTAGE OF UTERUS       Diagnosis: Irregular periods [N92.6]  Diagnosis Additional Information: No value filed.    Anesthesia Type:   MAC     Note:    Oropharynx: oropharynx clear of all foreign objects and spontaneously breathing  Level of Consciousness: awake  Oxygen Supplementation: room air    Independent Airway: airway patency satisfactory and stable  Dentition: dentition unchanged  Vital Signs Stable: post-procedure vital signs reviewed and stable  Report to RN Given: handoff report given  Patient transferred to: Phase II    Handoff Report: Identifed the Patient, Identified the Reponsible Provider, Reviewed the pertinent medical history, Discussed the surgical course, Reviewed Intra-OP anesthesia mangement and issues during anesthesia, Set expectations for post-procedure period and Allowed opportunity for questions and acknowledgement of understanding      Vitals:  Vitals Value Taken Time   /53 06/09/22 1111   Temp 36.1  C (97  F) 06/09/22 1111   Pulse 96 06/09/22 1111   Resp 16 06/09/22 1111   SpO2 99 % 06/09/22 1111       Electronically Signed By: BHAVIN Saenz CRNA  June 9, 2022  11:13 AM

## 2022-06-09 NOTE — OP NOTE
PROCEDURE NOTE - HYSTEROSCOPY AND DILATION AND CURETTAGE     Name: Yo Aragon   : 1990   MRN: [id number]     DATE OF SERVICE:  2022     PREOPERATIVE DIAGNOSIS:   Abnormal uterine bleeding  Thickened endometrium      POSTOPERATIVE DIAGNOSIS:   Abnormal uterine bleeding  Thickened endometrium    PROCEDURES:   Hysteroscopy  Dilatation and curettage    FINDINGS:  Fluffy white tissue  Normal bilateral tubal ostia  Hemostasis at the end of the case    SURGEON: Margi Feng MD     ASSISTANT: OR staff    ANESTHESIA:  mac    ESTIMATED BLOOD LOSS: 10 ml    HISTORY OF PRESENT ILLNESS:  This is a 32 year old female with history of dysfunctional uterine bleeding and thickened endometrium on ultrasound. She had a transvaginal ultrasound prior to which showed and thickened lining.  She was given informed consent for the above procedure. The risks, benefits and alternatives were discussed with her including but not exclusive to uterine perforation, bleeding and infection. She expressed understanding and wished to proceed.     PROCEDURE:  The patient was brought to the operating room and after induction of MAC anesthesia was prepped and draped in the dorsal lithotomy position. A time out was called and the patient and the procedure were verified.  A bimanual exam was done, indicating retroverted uterus. No other abnormalities were noted.     A sterile speculum was then placed. The anterior lip of the cervix was grasped with a single tooth tenaculum. Uterine cavity was then sounded to 12 cm. The cervix was gently dilated using Karyna dilators and the hysteroscope was introduced without difficulty. The cavity of the uterus appeared to have fluffy white tissue. No polyps were seen. The myosure resectoscope was inserted and all 4 quadrants sampled under direct visualization. The hysteroscope and myosure were removed. At this point endometrial curettings were obtained by curettage. All quadrants were sampled, and the  tissue was submitted for pathologic exam. The hysteroscope was reinserted and the uterine cavity was visualized again.  At this point good hemostasis was noted and the hysteroscope was removed once again. Instruments were removed from vagina. No active bleeding was noted. Sponge and needle counts were correct X 2. She was taken to recovery in stable condition.    Margi Fneg MD  (P) [phone]    CC: Kaitlynn Cast Sheng Person,  Margi Feng MD

## 2022-06-09 NOTE — ANESTHESIA PREPROCEDURE EVALUATION
Anesthesia Pre-Procedure Evaluation    Patient: Yo Aragon   MRN: [id number] : 1990        Procedure : Procedure(s):  HYSTEROSCOPY, WITH DILATION AND CURETTAGE OF UTERUS          Past Medical History:   Diagnosis Date     High-risk pregnancy in third trimester 2019     Viral hepatitis B chronic (H)       Past Surgical History:   Procedure Laterality Date     APPENDECTOMY  2020     C/SECTION, LOW TRANSVERSE        SECTION N/A 2017    Procedure:  SECTION;  Surgeon: Debbie Sloan DO;  Location: [facility] OR;  Service:      [place] LAP,APPENDECTOMY N/A 2020    Procedure: APPENDECTOMY, LAPAROSCOPIC;  Surgeon: Jeramy Gonzales MD;  Location: [facility];  Service: General      Allergies   Allergen Reactions     No Known Allergies       Social History     Tobacco Use     Smoking status: Passive Smoke Exposure - Never Smoker     Smokeless tobacco: Never Used     Tobacco comment: vapor exposure    Substance Use Topics     Alcohol use: Not Currently      Wt Readings from Last 1 Encounters:   03/15/21 55.6 kg (122 lb 9.6 oz)        Anesthesia Evaluation   Pt has had prior anesthetic.         ROS/MED HX  ENT/Pulmonary:  - neg pulmonary ROS     Neurologic:  - neg neurologic ROS     Cardiovascular:  - neg cardiovascular ROS     METS/Exercise Tolerance:     Hematologic:  - neg hematologic  ROS     Musculoskeletal:  - neg musculoskeletal ROS     GI/Hepatic:     (+) hepatitis type B, liver disease,     Renal/Genitourinary:  - neg Renal ROS     Endo:  - neg endo ROS     Psychiatric/Substance Use:  - neg psychiatric ROS     Infectious Disease:  - neg infectious disease ROS     Malignancy:  - neg malignancy ROS     Other:  - neg other ROS          Physical Exam    Airway  airway exam normal      Mallampati: II       Respiratory Devices and Support         Dental  no notable dental history         Cardiovascular   cardiovascular exam normal       Rhythm and rate: regular and  normal     Pulmonary   pulmonary exam normal        breath sounds clear to auscultation           OUTSIDE LABS:  CBC:   Lab Results   Component Value Date    WBC 11.8 (H) 02/27/2021    WBC 10.4 04/14/2020    HGB 10.0 (L) 02/27/2021    HGB 10.2 (L) 02/27/2021    HCT 32.6 (L) 02/27/2021    HCT 31.3 (L) 02/01/2021     02/27/2021     02/27/2021     BMP:   Lab Results   Component Value Date     04/14/2020     04/13/2020    POTASSIUM 3.9 04/14/2020    POTASSIUM 3.3 (L) 04/13/2020    CHLORIDE 111 (H) 04/14/2020    CHLORIDE 111 (H) 04/13/2020    CO2 22 04/14/2020    CO2 20 (L) 04/13/2020    BUN 6 (L) 04/14/2020    BUN 6 (L) 04/13/2020    CR 0.60 04/14/2020    CR 0.63 04/13/2020     (H) 04/14/2020     04/13/2020     COAGS:   Lab Results   Component Value Date    INR 1.24 (H) 04/13/2020     POC:   Lab Results   Component Value Date    HCG Positive (A) 09/24/2020     HEPATIC:   Lab Results   Component Value Date    ALBUMIN 2.9 (L) 10/15/2020    PROTTOTAL 6.6 10/15/2020    ALT 33 04/12/2020    AST 24 04/12/2020    ALKPHOS 92 04/12/2020    BILITOTAL 0.3 10/15/2020    BILIDIRECT 0.1 10/15/2020     OTHER:   Lab Results   Component Value Date    RAJENDRA 8.3 (L) 04/14/2020    MAG 1.8 04/12/2020       Anesthesia Plan    ASA Status:  2      Anesthesia Type: MAC.   Induction: Propofol.   Maintenance: TIVA.        Consents    Anesthesia Plan(s) and associated risks, benefits, and realistic alternatives discussed. Questions answered and patient/representative(s) expressed understanding.    - Discussed:     - Discussed with:  Patient      - Extended Intubation/Ventilatory Support Discussed: No.      - Patient is DNR/DNI Status: No    Use of blood products discussed: No .     Postoperative Care    Pain management: IV analgesics.   PONV prophylaxis: Ondansetron (or other 5HT-3), Dexamethasone or Solumedrol     Comments:    Other Comments: The patient understands and accepts the risks of MAC anesthesia  including (but not limited to) nausea, vomiting, dizziness, and chipped teeth. I also discussed the possibility of conversion to GAETT/GALMA anesthesia which include hoarse voice, sore throat, and pinched lip or chipped teeth.  No tylenol in this patient  Versed/fent  propofol ggt  Decadron/zoan            Teodoro Grove MD

## 2022-09-03 ENCOUNTER — HEALTH MAINTENANCE LETTER (OUTPATIENT)
Age: 32
End: 2022-09-03

## 2022-11-14 NOTE — PROGRESS NOTES
Physical Therapy Daily Treatment Note      Patient: Marichuy Dinh   : 1969  Referring practitioner: Canelo Willett MD  Date of Initial Visit: Type: THERAPY  Noted: 2022  Today's Date: 2022  Patient seen for 6 sessions       Visit Diagnoses:    ICD-10-CM ICD-9-CM   1. Closed fracture of left wrist with routine healing, subsequent encounter  S62.102D V54.19   2. Closed fracture of right wrist with routine healing, subsequent encounter  S62.101D V54.19       Subjective Evaluation    History of Present Illness    Subjective comment: Pt has increased tightness in both wrists today.  Pt has 2/10 pain today.       Objective   See Exercise, Manual, and Modality Logs for complete treatment.       Assessment & Plan     Assessment    Assessment details: Tx today consisted of mh to both wrists; followed by manual stretching, functional mobility and functional stability exercises with added overhead weight reaching and ended with stm to right hand and wrist and ice post tx. Pt responded well to tx with noted cont limitations with bilateral wrist extensions. Pt reported no change in pain post tx.    Plan  Plan details: Will follow progressing wrist mobility and functional stability.            Timed:         Manual Therapy:    14     mins  03635;     Therapeutic Exercise:    26     mins  19319;     Neuromuscular Leilani:        mins  94756;    Therapeutic Activity:  14        mins  73266;     Gait Training:           mins  75361;     Ultrasound:          mins  49782;    Ionto                                   mins   57407  Self Care                            mins   44828  Canalith Repos         mins 36555      Un-Timed:  Electrical Stimulation:         mins  05653 (MC );  Dry Needling          mins self-pay  Traction          mins 81037      Timed Treatment:   54   mins   Total Treatment:     66   Mins(6 min mh and 6 min ice)    David Wood PT  [place] License: [id number]      Electronically signed by  Preceptor Attestation:   Patient seen and discussed with the resident. Assessment and plan reviewed with resident and agreed upon.   Supervising Physician:  Skip Gonzalez MD  [facility]     David Wood, PT, 11/14/22, 2:18 PM EST

## 2023-03-28 ENCOUNTER — LAB REQUISITION (OUTPATIENT)
Dept: LAB | Facility: CLINIC | Age: 33
End: 2023-03-28
Payer: COMMERCIAL

## 2023-03-28 DIAGNOSIS — Z32.01 ENCOUNTER FOR PREGNANCY TEST, RESULT POSITIVE: ICD-10-CM

## 2023-03-28 LAB
BASOPHILS # BLD AUTO: 0 10E3/UL (ref 0–0.2)
BASOPHILS NFR BLD AUTO: 1 %
EOSINOPHIL # BLD AUTO: 0 10E3/UL (ref 0–0.7)
EOSINOPHIL NFR BLD AUTO: 1 %
ERYTHROCYTE [DISTWIDTH] IN BLOOD BY AUTOMATED COUNT: 13.6 % (ref 10–15)
HCT VFR BLD AUTO: 38.5 % (ref 35–47)
HGB BLD-MCNC: 11.7 G/DL (ref 11.7–15.7)
IMM GRANULOCYTES # BLD: 0 10E3/UL
IMM GRANULOCYTES NFR BLD: 1 %
LYMPHOCYTES # BLD AUTO: 1.9 10E3/UL (ref 0.8–5.3)
LYMPHOCYTES NFR BLD AUTO: 32 %
MCH RBC QN AUTO: 26.5 PG (ref 26.5–33)
MCHC RBC AUTO-ENTMCNC: 30.4 G/DL (ref 31.5–36.5)
MCV RBC AUTO: 87 FL (ref 78–100)
MONOCYTES # BLD AUTO: 0.5 10E3/UL (ref 0–1.3)
MONOCYTES NFR BLD AUTO: 8 %
NEUTROPHILS # BLD AUTO: 3.3 10E3/UL (ref 1.6–8.3)
NEUTROPHILS NFR BLD AUTO: 57 %
NRBC # BLD AUTO: 0 10E3/UL
NRBC BLD AUTO-RTO: 0 /100
PLATELET # BLD AUTO: 238 10E3/UL (ref 150–450)
RBC # BLD AUTO: 4.42 10E6/UL (ref 3.8–5.2)
WBC # BLD AUTO: 5.8 10E3/UL (ref 4–11)

## 2023-03-28 PROCEDURE — 86762 RUBELLA ANTIBODY: CPT | Mod: ORL | Performed by: FAMILY MEDICINE

## 2023-03-28 PROCEDURE — 86780 TREPONEMA PALLIDUM: CPT | Mod: ORL | Performed by: FAMILY MEDICINE

## 2023-03-28 PROCEDURE — 87491 CHLMYD TRACH DNA AMP PROBE: CPT | Mod: ORL | Performed by: FAMILY MEDICINE

## 2023-03-28 PROCEDURE — 87086 URINE CULTURE/COLONY COUNT: CPT | Mod: ORL | Performed by: FAMILY MEDICINE

## 2023-03-28 PROCEDURE — 86900 BLOOD TYPING SEROLOGIC ABO: CPT | Mod: ORL | Performed by: FAMILY MEDICINE

## 2023-03-28 PROCEDURE — 85025 COMPLETE CBC W/AUTO DIFF WBC: CPT | Mod: ORL | Performed by: FAMILY MEDICINE

## 2023-03-28 PROCEDURE — 86803 HEPATITIS C AB TEST: CPT | Mod: ORL | Performed by: FAMILY MEDICINE

## 2023-03-28 PROCEDURE — 87340 HEPATITIS B SURFACE AG IA: CPT | Mod: ORL | Performed by: FAMILY MEDICINE

## 2023-03-28 PROCEDURE — 87389 HIV-1 AG W/HIV-1&-2 AB AG IA: CPT | Mod: ORL | Performed by: FAMILY MEDICINE

## 2023-03-29 LAB
ABO/RH(D): NORMAL
ANTIBODY SCREEN: NEGATIVE
C TRACH DNA SPEC QL PROBE+SIG AMP: NEGATIVE
HCV AB SERPL QL IA: NONREACTIVE
HIV 1+2 AB+HIV1 P24 AG SERPL QL IA: NONREACTIVE
N GONORRHOEA DNA SPEC QL NAA+PROBE: NEGATIVE
RUBV IGG SERPL QL IA: 16.1 INDEX
RUBV IGG SERPL QL IA: POSITIVE
SPECIMEN EXPIRATION DATE: NORMAL
T PALLIDUM AB SER QL: NONREACTIVE

## 2023-03-30 LAB
BACTERIA UR CULT: NO GROWTH
HBV SURFACE AG SERPL QL IA: REACTIVE

## 2023-04-06 ENCOUNTER — LAB REQUISITION (OUTPATIENT)
Dept: LAB | Facility: CLINIC | Age: 33
End: 2023-04-06
Payer: COMMERCIAL

## 2023-04-06 DIAGNOSIS — B16.9 ACUTE HEPATITIS B WITHOUT DELTA-AGENT AND WITHOUT HEPATIC COMA: ICD-10-CM

## 2023-04-06 LAB
ALBUMIN SERPL BCG-MCNC: 3.5 G/DL (ref 3.5–5.2)
ALP SERPL-CCNC: 64 U/L (ref 35–104)
ALT SERPL W P-5'-P-CCNC: 59 U/L (ref 10–35)
AST SERPL W P-5'-P-CCNC: 44 U/L (ref 10–35)
BILIRUB DIRECT SERPL-MCNC: <0.2 MG/DL (ref 0–0.3)
BILIRUB SERPL-MCNC: <0.2 MG/DL
PROT SERPL-MCNC: 6.3 G/DL (ref 6.4–8.3)

## 2023-04-06 PROCEDURE — 87350 HEPATITIS BE AG IA: CPT | Mod: ORL | Performed by: FAMILY MEDICINE

## 2023-04-06 PROCEDURE — 80076 HEPATIC FUNCTION PANEL: CPT | Mod: ORL | Performed by: FAMILY MEDICINE

## 2023-04-07 ENCOUNTER — LAB REQUISITION (OUTPATIENT)
Dept: LAB | Facility: CLINIC | Age: 33
End: 2023-04-07
Payer: COMMERCIAL

## 2023-04-07 PROCEDURE — 87517 HEPATITIS B DNA QUANT: CPT | Mod: ORL | Performed by: FAMILY MEDICINE

## 2023-04-08 LAB — HBV E AG SERPL QL IA: NEGATIVE

## 2023-04-11 LAB
HBV DNA SERPL NAA+PROBE-ACNC: 105 IU/ML
HBV DNA SERPL NAA+PROBE-LOG IU: 2 {LOG_IU}/ML

## 2023-05-26 ENCOUNTER — MEDICAL CORRESPONDENCE (OUTPATIENT)
Dept: HEALTH INFORMATION MANAGEMENT | Facility: CLINIC | Age: 33
End: 2023-05-26
Payer: COMMERCIAL

## 2023-05-26 ENCOUNTER — TRANSFERRED RECORDS (OUTPATIENT)
Dept: HEALTH INFORMATION MANAGEMENT | Facility: CLINIC | Age: 33
End: 2023-05-26
Payer: COMMERCIAL

## 2023-06-01 ENCOUNTER — TRANSCRIBE ORDERS (OUTPATIENT)
Dept: MATERNAL FETAL MEDICINE | Facility: HOSPITAL | Age: 33
End: 2023-06-01
Payer: COMMERCIAL

## 2023-06-01 ENCOUNTER — PRE VISIT (OUTPATIENT)
Dept: MATERNAL FETAL MEDICINE | Facility: CLINIC | Age: 33
End: 2023-06-01
Payer: COMMERCIAL

## 2023-06-01 ENCOUNTER — MEDICAL CORRESPONDENCE (OUTPATIENT)
Dept: HEALTH INFORMATION MANAGEMENT | Facility: CLINIC | Age: 33
End: 2023-06-01
Payer: COMMERCIAL

## 2023-06-01 DIAGNOSIS — O26.90 PREGNANCY RELATED CONDITION, ANTEPARTUM: Primary | ICD-10-CM

## 2023-06-02 ENCOUNTER — HEALTH MAINTENANCE LETTER (OUTPATIENT)
Age: 33
End: 2023-06-02

## 2023-06-05 ENCOUNTER — TRANSFERRED RECORDS (OUTPATIENT)
Dept: HEALTH INFORMATION MANAGEMENT | Facility: CLINIC | Age: 33
End: 2023-06-05

## 2023-06-05 ENCOUNTER — OFFICE VISIT (OUTPATIENT)
Dept: MATERNAL FETAL MEDICINE | Facility: CLINIC | Age: 33
End: 2023-06-05
Attending: FAMILY MEDICINE
Payer: COMMERCIAL

## 2023-06-05 ENCOUNTER — HOSPITAL ENCOUNTER (OUTPATIENT)
Dept: ULTRASOUND IMAGING | Facility: CLINIC | Age: 33
Discharge: HOME | End: 2023-06-05
Attending: FAMILY MEDICINE
Payer: COMMERCIAL

## 2023-06-05 DIAGNOSIS — O09.299 PREVIOUS PREGNANCY WITH CONGENITAL HEART DEFECT, CURRENTLY PREGNANT: Primary | ICD-10-CM

## 2023-06-05 DIAGNOSIS — O26.90 PREGNANCY RELATED CONDITION, ANTEPARTUM: ICD-10-CM

## 2023-06-05 DIAGNOSIS — Z36.3 ENCOUNTER FOR ROUTINE SCREENING FOR FETAL MALFORMATION USING ULTRASOUND: ICD-10-CM

## 2023-06-05 PROCEDURE — 76811 OB US DETAILED SNGL FETUS: CPT | Mod: 26 | Performed by: OBSTETRICS & GYNECOLOGY

## 2023-06-05 PROCEDURE — 76811 OB US DETAILED SNGL FETUS: CPT

## 2023-06-05 NOTE — PROGRESS NOTES
Please refer to ultrasound report under 'Imaging' Studies of 'Chart Review' tabs.    Sai Batres M.D.

## 2023-06-05 NOTE — NURSING NOTE
Patient here for L2 ultrasound today. States she has no questions or concerns. SBAR given to FER NICOLE, see their note in Epic.

## 2023-06-23 ENCOUNTER — LAB REQUISITION (OUTPATIENT)
Dept: LAB | Facility: CLINIC | Age: 33
End: 2023-06-23
Payer: COMMERCIAL

## 2023-06-23 DIAGNOSIS — R79.89 OTHER SPECIFIED ABNORMAL FINDINGS OF BLOOD CHEMISTRY: ICD-10-CM

## 2023-06-23 PROCEDURE — 80076 HEPATIC FUNCTION PANEL: CPT | Mod: ORL | Performed by: FAMILY MEDICINE

## 2023-06-24 LAB
ALBUMIN SERPL BCG-MCNC: 3.5 G/DL (ref 3.5–5.2)
ALP SERPL-CCNC: 60 U/L (ref 35–104)
ALT SERPL W P-5'-P-CCNC: 18 U/L (ref 0–50)
AST SERPL W P-5'-P-CCNC: 24 U/L (ref 0–45)
BILIRUB DIRECT SERPL-MCNC: <0.2 MG/DL (ref 0–0.3)
BILIRUB SERPL-MCNC: 0.3 MG/DL
PROT SERPL-MCNC: 6.6 G/DL (ref 6.4–8.3)

## 2023-07-07 ENCOUNTER — OFFICE VISIT (OUTPATIENT)
Dept: MATERNAL FETAL MEDICINE | Facility: HOSPITAL | Age: 33
End: 2023-07-07
Attending: OBSTETRICS & GYNECOLOGY
Payer: COMMERCIAL

## 2023-07-07 ENCOUNTER — ANCILLARY PROCEDURE (OUTPATIENT)
Dept: ULTRASOUND IMAGING | Facility: HOSPITAL | Age: 33
End: 2023-07-07
Attending: OBSTETRICS & GYNECOLOGY
Payer: COMMERCIAL

## 2023-07-07 DIAGNOSIS — O09.299 PREVIOUS PREGNANCY WITH CONGENITAL HEART DEFECT, CURRENTLY PREGNANT: ICD-10-CM

## 2023-07-07 DIAGNOSIS — O09.299 PREVIOUS PREGNANCY WITH CONGENITAL HEART DEFECT, CURRENTLY PREGNANT: Primary | ICD-10-CM

## 2023-07-07 PROCEDURE — 93325 DOPPLER ECHO COLOR FLOW MAPG: CPT

## 2023-07-07 PROCEDURE — 93325 DOPPLER ECHO COLOR FLOW MAPG: CPT | Mod: 26 | Performed by: OBSTETRICS & GYNECOLOGY

## 2023-07-07 PROCEDURE — 76827 ECHO EXAM OF FETAL HEART: CPT | Mod: 26 | Performed by: OBSTETRICS & GYNECOLOGY

## 2023-07-07 PROCEDURE — 99207 PR NO CHARGE LOS: CPT | Performed by: OBSTETRICS & GYNECOLOGY

## 2023-07-07 PROCEDURE — 76825 ECHO EXAM OF FETAL HEART: CPT | Mod: 26 | Performed by: OBSTETRICS & GYNECOLOGY

## 2023-07-07 NOTE — NURSING NOTE
Yo Aragon is a  at 23w5d who presents to [facility] for fetal echo. Pt reports positive fetal movement.   SBAR given to Dr. Anderson, see note in Epic.     Heather Cervantes RN

## 2023-07-07 NOTE — PROGRESS NOTES
Please see full imaging report from [employer] program under imaging tab.    Von Anderson MD  Maternal Fetal Medicine

## 2023-07-21 ENCOUNTER — LAB REQUISITION (OUTPATIENT)
Dept: LAB | Facility: CLINIC | Age: 33
End: 2023-07-21
Payer: COMMERCIAL

## 2023-07-21 DIAGNOSIS — B16.9 ACUTE HEPATITIS B WITHOUT DELTA-AGENT AND WITHOUT HEPATIC COMA: ICD-10-CM

## 2023-07-21 DIAGNOSIS — Z34.81 ENCOUNTER FOR SUPERVISION OF OTHER NORMAL PREGNANCY, FIRST TRIMESTER: ICD-10-CM

## 2023-07-21 PROCEDURE — 86780 TREPONEMA PALLIDUM: CPT | Mod: ORL | Performed by: FAMILY MEDICINE

## 2023-07-21 PROCEDURE — 80076 HEPATIC FUNCTION PANEL: CPT | Mod: ORL | Performed by: FAMILY MEDICINE

## 2023-07-22 LAB
ALBUMIN SERPL BCG-MCNC: 3.5 G/DL (ref 3.5–5.2)
ALP SERPL-CCNC: 66 U/L (ref 35–104)
ALT SERPL W P-5'-P-CCNC: 23 U/L (ref 0–50)
AST SERPL W P-5'-P-CCNC: 40 U/L (ref 0–45)
BILIRUB DIRECT SERPL-MCNC: <0.2 MG/DL (ref 0–0.3)
BILIRUB SERPL-MCNC: 0.2 MG/DL
PROT SERPL-MCNC: 6.4 G/DL (ref 6.4–8.3)
T PALLIDUM AB SER QL: NONREACTIVE

## 2023-08-25 ENCOUNTER — LAB REQUISITION (OUTPATIENT)
Dept: LAB | Facility: CLINIC | Age: 33
End: 2023-08-25
Payer: COMMERCIAL

## 2023-08-25 DIAGNOSIS — B16.9 ACUTE HEPATITIS B WITHOUT DELTA-AGENT AND WITHOUT HEPATIC COMA: ICD-10-CM

## 2023-08-25 PROCEDURE — 87517 HEPATITIS B DNA QUANT: CPT | Mod: ORL | Performed by: FAMILY MEDICINE

## 2023-08-26 LAB
HBV DNA SERPL NAA+PROBE-ACNC: 6050 IU/ML
HBV DNA SERPL NAA+PROBE-LOG IU: 3.8 {LOG_IU}/ML

## 2023-10-05 ENCOUNTER — LAB REQUISITION (OUTPATIENT)
Dept: LAB | Facility: CLINIC | Age: 33
End: 2023-10-05
Payer: COMMERCIAL

## 2023-10-05 PROCEDURE — 87653 STREP B DNA AMP PROBE: CPT | Mod: ORL | Performed by: FAMILY MEDICINE

## 2023-10-06 LAB — GP B STREP DNA SPEC QL NAA+PROBE: POSITIVE

## 2023-10-19 ENCOUNTER — TRANSFERRED RECORDS (OUTPATIENT)
Dept: HEALTH INFORMATION MANAGEMENT | Facility: CLINIC | Age: 33
End: 2023-10-19
Payer: COMMERCIAL

## 2023-10-27 ENCOUNTER — HOSPITAL ENCOUNTER (INPATIENT)
Facility: HOSPITAL | Age: 33
LOS: 2 days | Discharge: HOME | End: 2023-10-29
Attending: FAMILY MEDICINE | Admitting: FAMILY MEDICINE
Payer: COMMERCIAL

## 2023-10-27 DIAGNOSIS — O99.013 ANEMIA DURING PREGNANCY IN THIRD TRIMESTER: ICD-10-CM

## 2023-10-27 DIAGNOSIS — O34.219 VBAC, DELIVERED, CURRENT HOSPITALIZATION: Primary | ICD-10-CM

## 2023-10-27 DIAGNOSIS — D62 ANEMIA DUE TO BLOOD LOSS, ACUTE: ICD-10-CM

## 2023-10-27 PROBLEM — O24.410 DIET CONTROLLED GESTATIONAL DIABETES MELLITUS (GDM), ANTEPARTUM: Status: ACTIVE | Noted: 2023-10-27

## 2023-10-27 PROBLEM — Z37.9 NORMAL LABOR: Status: ACTIVE | Noted: 2023-10-27

## 2023-10-27 PROBLEM — B18.1 VIRAL HEPATITIS B CHRONIC (H): Status: ACTIVE | Noted: 2019-12-30

## 2023-10-27 LAB
ABO/RH(D): NORMAL
ANTIBODY SCREEN: NEGATIVE
GLUCOSE BLDC GLUCOMTR-MCNC: 105 MG/DL (ref 70–99)
GLUCOSE BLDC GLUCOMTR-MCNC: 79 MG/DL (ref 70–99)
GLUCOSE BLDC GLUCOMTR-MCNC: 80 MG/DL (ref 70–99)
HBA1C MFR BLD: 5.8 %
HGB BLD-MCNC: 10.3 G/DL (ref 11.7–15.7)
SPECIMEN EXPIRATION DATE: NORMAL
T PALLIDUM AB SER QL: NONREACTIVE

## 2023-10-27 PROCEDURE — 722N000001 HC LABOR CARE VAGINAL DELIVERY SINGLE

## 2023-10-27 PROCEDURE — 85018 HEMOGLOBIN: CPT | Performed by: FAMILY MEDICINE

## 2023-10-27 PROCEDURE — 83036 HEMOGLOBIN GLYCOSYLATED A1C: CPT | Performed by: FAMILY MEDICINE

## 2023-10-27 PROCEDURE — 86923 COMPATIBILITY TEST ELECTRIC: CPT | Performed by: FAMILY MEDICINE

## 2023-10-27 PROCEDURE — 120N000001 HC R&B MED SURG/OB

## 2023-10-27 PROCEDURE — 86901 BLOOD TYPING SEROLOGIC RH(D): CPT | Performed by: FAMILY MEDICINE

## 2023-10-27 PROCEDURE — 86780 TREPONEMA PALLIDUM: CPT | Performed by: FAMILY MEDICINE

## 2023-10-27 PROCEDURE — 258N000003 HC RX IP 258 OP 636: Performed by: FAMILY MEDICINE

## 2023-10-27 PROCEDURE — 250N000011 HC RX IP 250 OP 636: Mod: JZ | Performed by: FAMILY MEDICINE

## 2023-10-27 PROCEDURE — 250N000009 HC RX 250: Performed by: FAMILY MEDICINE

## 2023-10-27 PROCEDURE — 36415 COLL VENOUS BLD VENIPUNCTURE: CPT | Performed by: FAMILY MEDICINE

## 2023-10-27 RX ORDER — CARBOPROST TROMETHAMINE 250 UG/ML
250 INJECTION, SOLUTION INTRAMUSCULAR
Status: DISCONTINUED | OUTPATIENT
Start: 2023-10-27 | End: 2023-10-29 | Stop reason: HOSPADM

## 2023-10-27 RX ORDER — IBUPROFEN 800 MG/1
800 TABLET, FILM COATED ORAL EVERY 6 HOURS PRN
Status: DISCONTINUED | OUTPATIENT
Start: 2023-10-27 | End: 2023-10-29 | Stop reason: HOSPADM

## 2023-10-27 RX ORDER — OXYTOCIN 10 [USP'U]/ML
10 INJECTION, SOLUTION INTRAMUSCULAR; INTRAVENOUS
Status: DISCONTINUED | OUTPATIENT
Start: 2023-10-27 | End: 2023-10-29 | Stop reason: HOSPADM

## 2023-10-27 RX ORDER — ONDANSETRON 2 MG/ML
4 INJECTION INTRAMUSCULAR; INTRAVENOUS EVERY 6 HOURS PRN
Status: DISCONTINUED | OUTPATIENT
Start: 2023-10-27 | End: 2023-10-27 | Stop reason: HOSPADM

## 2023-10-27 RX ORDER — ONDANSETRON 4 MG/1
4 TABLET, ORALLY DISINTEGRATING ORAL EVERY 6 HOURS PRN
Status: DISCONTINUED | OUTPATIENT
Start: 2023-10-27 | End: 2023-10-27 | Stop reason: HOSPADM

## 2023-10-27 RX ORDER — MISOPROSTOL 200 UG/1
800 TABLET ORAL
Status: DISCONTINUED | OUTPATIENT
Start: 2023-10-27 | End: 2023-10-27 | Stop reason: HOSPADM

## 2023-10-27 RX ORDER — DEXTROSE MONOHYDRATE 25 G/50ML
25-50 INJECTION, SOLUTION INTRAVENOUS
Status: DISCONTINUED | OUTPATIENT
Start: 2023-10-27 | End: 2023-10-27 | Stop reason: HOSPADM

## 2023-10-27 RX ORDER — IBUPROFEN 800 MG/1
800 TABLET, FILM COATED ORAL
Status: DISCONTINUED | OUTPATIENT
Start: 2023-10-27 | End: 2023-10-29 | Stop reason: HOSPADM

## 2023-10-27 RX ORDER — OXYTOCIN/0.9 % SODIUM CHLORIDE 30/500 ML
100-340 PLASTIC BAG, INJECTION (ML) INTRAVENOUS CONTINUOUS PRN
Status: DISCONTINUED | OUTPATIENT
Start: 2023-10-27 | End: 2023-10-29 | Stop reason: HOSPADM

## 2023-10-27 RX ORDER — OXYTOCIN/0.9 % SODIUM CHLORIDE 30/500 ML
340 PLASTIC BAG, INJECTION (ML) INTRAVENOUS CONTINUOUS PRN
Status: DISCONTINUED | OUTPATIENT
Start: 2023-10-27 | End: 2023-10-29 | Stop reason: HOSPADM

## 2023-10-27 RX ORDER — PENICILLIN G 3000000 [IU]/50ML
3 INJECTION, SOLUTION INTRAVENOUS EVERY 4 HOURS
Status: DISCONTINUED | OUTPATIENT
Start: 2023-10-27 | End: 2023-10-27 | Stop reason: HOSPADM

## 2023-10-27 RX ORDER — ACETAMINOPHEN 325 MG/1
650 TABLET ORAL EVERY 4 HOURS PRN
Status: DISCONTINUED | OUTPATIENT
Start: 2023-10-27 | End: 2023-10-29 | Stop reason: HOSPADM

## 2023-10-27 RX ORDER — HYDROCORTISONE 25 MG/G
CREAM TOPICAL 3 TIMES DAILY PRN
Status: DISCONTINUED | OUTPATIENT
Start: 2023-10-27 | End: 2023-10-29 | Stop reason: HOSPADM

## 2023-10-27 RX ORDER — PROCHLORPERAZINE MALEATE 10 MG
10 TABLET ORAL EVERY 6 HOURS PRN
Status: DISCONTINUED | OUTPATIENT
Start: 2023-10-27 | End: 2023-10-27 | Stop reason: HOSPADM

## 2023-10-27 RX ORDER — NALOXONE HYDROCHLORIDE 0.4 MG/ML
0.4 INJECTION, SOLUTION INTRAMUSCULAR; INTRAVENOUS; SUBCUTANEOUS
Status: DISCONTINUED | OUTPATIENT
Start: 2023-10-27 | End: 2023-10-27 | Stop reason: HOSPADM

## 2023-10-27 RX ORDER — DOCUSATE SODIUM 100 MG/1
100 CAPSULE, LIQUID FILLED ORAL DAILY
Status: DISCONTINUED | OUTPATIENT
Start: 2023-10-27 | End: 2023-10-29 | Stop reason: HOSPADM

## 2023-10-27 RX ORDER — OXYTOCIN 10 [USP'U]/ML
10 INJECTION, SOLUTION INTRAMUSCULAR; INTRAVENOUS
Status: DISCONTINUED | OUTPATIENT
Start: 2023-10-27 | End: 2023-10-27 | Stop reason: HOSPADM

## 2023-10-27 RX ORDER — MISOPROSTOL 200 UG/1
400 TABLET ORAL
Status: DISCONTINUED | OUTPATIENT
Start: 2023-10-27 | End: 2023-10-27 | Stop reason: HOSPADM

## 2023-10-27 RX ORDER — SODIUM CHLORIDE, SODIUM LACTATE, POTASSIUM CHLORIDE, CALCIUM CHLORIDE 600; 310; 30; 20 MG/100ML; MG/100ML; MG/100ML; MG/100ML
INJECTION, SOLUTION INTRAVENOUS CONTINUOUS PRN
Status: DISCONTINUED | OUTPATIENT
Start: 2023-10-27 | End: 2023-10-27 | Stop reason: HOSPADM

## 2023-10-27 RX ORDER — METHYLERGONOVINE MALEATE 0.2 MG/ML
200 INJECTION INTRAVENOUS
Status: DISCONTINUED | OUTPATIENT
Start: 2023-10-27 | End: 2023-10-27 | Stop reason: HOSPADM

## 2023-10-27 RX ORDER — MISOPROSTOL 200 UG/1
800 TABLET ORAL
Status: DISCONTINUED | OUTPATIENT
Start: 2023-10-27 | End: 2023-10-29 | Stop reason: HOSPADM

## 2023-10-27 RX ORDER — LIDOCAINE 40 MG/G
CREAM TOPICAL
Status: DISCONTINUED | OUTPATIENT
Start: 2023-10-27 | End: 2023-10-27 | Stop reason: HOSPADM

## 2023-10-27 RX ORDER — BISACODYL 10 MG
10 SUPPOSITORY, RECTAL RECTAL DAILY PRN
Status: DISCONTINUED | OUTPATIENT
Start: 2023-10-27 | End: 2023-10-29 | Stop reason: HOSPADM

## 2023-10-27 RX ORDER — KETOROLAC TROMETHAMINE 30 MG/ML
30 INJECTION, SOLUTION INTRAMUSCULAR; INTRAVENOUS
Status: DISCONTINUED | OUTPATIENT
Start: 2023-10-27 | End: 2023-10-29 | Stop reason: HOSPADM

## 2023-10-27 RX ORDER — CARBOPROST TROMETHAMINE 250 UG/ML
250 INJECTION, SOLUTION INTRAMUSCULAR
Status: DISCONTINUED | OUTPATIENT
Start: 2023-10-27 | End: 2023-10-27 | Stop reason: HOSPADM

## 2023-10-27 RX ORDER — NICOTINE POLACRILEX 4 MG
15-30 LOZENGE BUCCAL
Status: DISCONTINUED | OUTPATIENT
Start: 2023-10-27 | End: 2023-10-27 | Stop reason: HOSPADM

## 2023-10-27 RX ORDER — METOCLOPRAMIDE 10 MG/1
10 TABLET ORAL EVERY 6 HOURS PRN
Status: DISCONTINUED | OUTPATIENT
Start: 2023-10-27 | End: 2023-10-27 | Stop reason: HOSPADM

## 2023-10-27 RX ORDER — CITRIC ACID/SODIUM CITRATE 334-500MG
30 SOLUTION, ORAL ORAL
Status: DISCONTINUED | OUTPATIENT
Start: 2023-10-27 | End: 2023-10-27 | Stop reason: HOSPADM

## 2023-10-27 RX ORDER — PENICILLIN G POTASSIUM 5000000 [IU]/1
5 INJECTION, POWDER, FOR SOLUTION INTRAMUSCULAR; INTRAVENOUS ONCE
Status: COMPLETED | OUTPATIENT
Start: 2023-10-27 | End: 2023-10-27

## 2023-10-27 RX ORDER — MISOPROSTOL 200 UG/1
400 TABLET ORAL
Status: DISCONTINUED | OUTPATIENT
Start: 2023-10-27 | End: 2023-10-29 | Stop reason: HOSPADM

## 2023-10-27 RX ORDER — PROCHLORPERAZINE 25 MG
25 SUPPOSITORY, RECTAL RECTAL EVERY 12 HOURS PRN
Status: DISCONTINUED | OUTPATIENT
Start: 2023-10-27 | End: 2023-10-27 | Stop reason: HOSPADM

## 2023-10-27 RX ORDER — OXYTOCIN/0.9 % SODIUM CHLORIDE 30/500 ML
340 PLASTIC BAG, INJECTION (ML) INTRAVENOUS CONTINUOUS PRN
Status: DISCONTINUED | OUTPATIENT
Start: 2023-10-27 | End: 2023-10-27 | Stop reason: HOSPADM

## 2023-10-27 RX ORDER — MODIFIED LANOLIN
OINTMENT (GRAM) TOPICAL
Status: DISCONTINUED | OUTPATIENT
Start: 2023-10-27 | End: 2023-10-29 | Stop reason: HOSPADM

## 2023-10-27 RX ORDER — NALOXONE HYDROCHLORIDE 0.4 MG/ML
0.2 INJECTION, SOLUTION INTRAMUSCULAR; INTRAVENOUS; SUBCUTANEOUS
Status: DISCONTINUED | OUTPATIENT
Start: 2023-10-27 | End: 2023-10-27 | Stop reason: HOSPADM

## 2023-10-27 RX ORDER — METHYLERGONOVINE MALEATE 0.2 MG/ML
200 INJECTION INTRAVENOUS
Status: DISCONTINUED | OUTPATIENT
Start: 2023-10-27 | End: 2023-10-29 | Stop reason: HOSPADM

## 2023-10-27 RX ORDER — METOCLOPRAMIDE HYDROCHLORIDE 5 MG/ML
10 INJECTION INTRAMUSCULAR; INTRAVENOUS EVERY 6 HOURS PRN
Status: DISCONTINUED | OUTPATIENT
Start: 2023-10-27 | End: 2023-10-27 | Stop reason: HOSPADM

## 2023-10-27 RX ORDER — OXYTOCIN/0.9 % SODIUM CHLORIDE 30/500 ML
1-24 PLASTIC BAG, INJECTION (ML) INTRAVENOUS CONTINUOUS
Status: DISCONTINUED | OUTPATIENT
Start: 2023-10-27 | End: 2023-10-27 | Stop reason: HOSPADM

## 2023-10-27 RX ADMIN — KETOROLAC TROMETHAMINE 30 MG: 30 INJECTION, SOLUTION INTRAMUSCULAR; INTRAVENOUS at 18:08

## 2023-10-27 RX ADMIN — SODIUM CHLORIDE, POTASSIUM CHLORIDE, SODIUM LACTATE AND CALCIUM CHLORIDE 500 ML: 600; 310; 30; 20 INJECTION, SOLUTION INTRAVENOUS at 10:05

## 2023-10-27 RX ADMIN — PENICILLIN G 3 MILLION UNITS: 3000000 INJECTION, SOLUTION INTRAVENOUS at 14:30

## 2023-10-27 RX ADMIN — Medication 2 MILLI-UNITS/MIN: at 14:40

## 2023-10-27 RX ADMIN — PENICILLIN G POTASSIUM 5 MILLION UNITS: 5000000 POWDER, FOR SOLUTION INTRAMUSCULAR; INTRAPLEURAL; INTRATHECAL; INTRAVENOUS at 10:06

## 2023-10-27 ASSESSMENT — ACTIVITIES OF DAILY LIVING (ADL)
ADLS_ACUITY_SCORE: 18

## 2023-10-27 NOTE — PROGRESS NOTES
Yo arrived ambulatory from home with complaints of contractions since midnight. Pt reports positive fetal movement and denies any vaginal bleeding or leaking of fluids. Abdomen soft, nontender. SVE 5/60/-2 intact. Dr Brock called and notified of pt arrival. See orders.

## 2023-10-27 NOTE — PROGRESS NOTES
Spoke to Dr. De La Rosa.  TOLAC  C/S x 1 for twins 17  Requeting op report   x 2.     Care per Dr. De La Rosa  Full note to follow

## 2023-10-27 NOTE — PROGRESS NOTES
At 1720 SROM, clear fluid. After SROM patient immediately become very uncomfortable with contractions and feeling pressure. Dr. Brock updated, on her way to the hospital.   1735: Dr. Brock at bedside.   1750: 8cm then 10cm after contraction  175: Patient began pushing  175:  of infant male, apgars 9 & 9.  1758: Placenta delivered, QBL 250mL.

## 2023-10-27 NOTE — L&D DELIVERY NOTE
Delivery Summary    Yo Aragon MRN# [id number]   Age: 33 year old YOB: 1990     ASSESSMENT & PLAN:   S/p vaginal delivery with adequate prophylaxis for GBS positive.    Admitted for early labor, was 5cm, her contractions slowly decreased, but she had already received GBS prophylaxis,  so low dose pitocin was started for augmentation.    She had srom of clear fluid on the toilet and started to feel a strong urge to push.    She was found to be 6cm.    She progressed to complete with pitocin.    She delivered over an intact perineum onto a sterile field.  Baby delivered in a URSULA position.    Delayed cord clamping of the 3 vessel  cord was completed by dad who was present.    Her blood sugars were 107 at the time of delivery.    Baby was placed skin to skin with mom.   A bhat placenta was delivered without complication, and found to be intact.    A small right periurethral tear was noted, but it had good hemostasis and good approximation, so it was not repaired.      Can discontinue glucose checks for mom.    Routine post partum cares.         Sulma AragonNaderYo [[id number]]      Labor Event Times          Active labor onset date: 10/27/23 Onset time:  5:20 PM   Dilation complete date: 10/27/23 Complete time:  5:50 PM   Start pushing date/time: 10/27/2023 1751          Labor Events     labor?: No  Labor Type: Spontaneous, Augmentation  Predominate monitoring during 1st stage: continuous electronic fetal monitoring     Antibiotics received during labor?: Yes  Reason for Antibiotics: GBS  Antibiotics received for GBS: Penicillin  Antibiotics Given (GBS): Greater than 4 hours prior to delivery     Rupture identifier: Sac 1  Rupture date/time: 10/27/23 1720   Rupture type: Spontaneous Rupture of Membranes  Fluid color: Clear  Fluid odor: Normal     Augmentation: Oxytocin  Augmentation date/time: 10/27/23 1440   Indications for augmentation: Ineffective Contraction Pattern       Delivery/Placenta Date  and Time      Delivery Date: 10/27/23 Delivery Time:  5:52 PM   Placenta Date/Time: 10/27/2023  5:57 PM  Oxytocin given at the time of delivery: after delivery of baby  Delivering clinician: Lynette Brock MD   Other personnel present at delivery:  Provider Role   Maame Middleton, Rolanda Ayoub, MARY Lares, Rachel ORTEGA RN              Vaginal Counts       Initial count performed by 2 team members:  Two Team Members   mohamud middleton         Needles Suture Needles Sponges (RETIRED) Instruments   Initial counts   5    Added to count       Relief counts       Final counts   5            Placed during labor Accounted for at the end of labor   FSE No NA   IUPC No NA   Cervidil No NA                  Final count performed by 2 team members:  Two Team Members   mohamud middleton      Final count correct?: Yes      Pre-Birth Team Brief: Complete  Post-Birth Team Debrief: Complete       Apgars    Living status: Living   1 Minute 5 Minute 10 Minute 15 Minute 20 Minute   Skin color: 1  1       Heart rate: 2  2       Reflex irritability: 2  2       Muscle tone: 2  2       Respiratory effort: 2  2       Total: 9  9       Apgars assigned by: DANIEL       Cord      Vessels: 3 Vessels    Cord Complications: None               Cord Blood Disposition: Lab    Gases Sent?: No    Delayed cord clamping?: Yes    Cord Clamping Delay (seconds): 31-60 seconds            Stem cell collection?: No           [place] Resuscitation    Methods: None              Skin to Skin and Feeding Plan      Skin to skin initiation date/time: 1/10/1841    Skin to skin with: Mother  Skin to skin end date/time:            Labor Events and Shoulder Dystocia    Fetal Tracing Prior to Delivery: Category 1  Shoulder dystocia present?: Neg                 Delivery (Maternal) (Provider to Complete) (218808)    Episiotomy: None      Perineal lacerations: None       Repaired?: No   Repair suture: None  Genital tract inspection done: Pos       Blood Loss  Mother:  Yo Aragon #[id number]     Start of Mother's Information      Delivery Blood Loss  10/27/23 1720 - 10/27/23 1824      Delivery QBL (mL) Hospital Encounter 250 mL    Total  250 mL               End of Mother's Information  Mother: Yo Aragon #[id number]                Delivery - Provider to Complete (267252)    Delivering clinician: Lynette Brock MD  Delivery Type (Choose the 1 that will go to the Birth History): Vaginal, Spontaneous                         Other personnel:  Provider Role   Maame Middleton RN Wyffels, Olivia, RN Rodriguez, Katie E, RN                     Placenta    Date/Time: 10/27/2023  5:57 PM  Removal: Spontaneous  Disposition: Hospital disposal             Anesthesia    Method: None                    Presentation and Position    Presentation: Vertex    Position: Left Occiput Anterior                     Lynette Brock MD

## 2023-10-27 NOTE — PROGRESS NOTES
Data: Patient admitted to room 17 at 1035. Patient is a . Prenatal record reviewed.   OB History    Para Term  AB Living   5 4 4 0 0 5   SAB IAB Ectopic Multiple Live Births   0 0 0 1 5      # Outcome Date GA Lbr Yonas/2nd Weight Sex Delivery Anes PTL Lv   5 Current            4 Term 21 40w1d  3.739 kg (8 lb 3.9 oz) M  None N MARTHA      Name: Janice Monico      Apgar1: 9  Apgar5: 9   3 Term 20 40w6d  3.771 kg (8 lb 5 oz) M  IV N MARTHA      Birth Comments: Complications: Nuchal, tight,delivered through      Complications: Nuchal cord affecting delivery      Name: Jeramy Monico      Apgar1: 8  Apgar5: 9   2A Term 17 38w0d  2.807 kg (6 lb 3 oz) M CS-LTranv Spinal N MARTHA      Name: JASEN,MALE A-MT      Apgar1: 8  Apgar5: 9   2B Term 17 38w0d  2.67 kg (5 lb 14.2 oz) M CS-Unspec Spinal N MARTHA      Name: JASEN,MALE B-MT      Apgar1: 9  Apgar5: 9   1 Term 13     Vag-Spont   MARTHA   .  Medical History:   Past Medical History:   Diagnosis Date    Diabetes (H)     Gestational diabetes    High-risk pregnancy in third trimester 2019    Viral hepatitis B chronic (H)    .  Gestational age 39w5d. Vital signs per doc flowsheet. Fetal movement present. Patient reports No chief complaint on file.   as reason for admission. Support persons , Bhavani, present.  Action: Report from MARY Christy obtained at 0945. Care of patient assumed at 0945. Verbal consent for EFM, external fetal monitors applied. Admission assessment completed. Patient and support persons educated on labor process. Patient instructed to report change in fetal movement, contractions, vaginal leaking of fluid or bleeding, abdominal pain, or any concerns related to the pregnancy to her nurse/physician. Patient oriented to room, call light in reach.   Response: Dr. Brock informed of patient arrival, contractions, SVE. Plan per provider is admit and start antibiotics for GBS prophylaxis. Patient verbalized understanding  of education and verbalized agreement with plan.

## 2023-10-27 NOTE — H&P
Maternal Admission H&P  [facility]  Date of Admission: 10/27/2023  Date of Service: 10/27/2023    Name      Yo Aragon         1990  MRN       [id number]  PCP        Lynette Brock [facility], [phone].    ________________________________________________________________________    Assessment and Plan:  33 year old  at 39w5d.  Baby AGA. Anticipate .  Group B strep: positive  Diet controlled gestational diabetes.    Hepatitis b, with low viral load.  Baby needs hbig and hep b  Tolac, with history of successful .    ________________________________________________________________________    Chief Complaint: contractions that started at midnight.    HPI: Yo Aragon is a 33 year old woman at 39w5d, based on an Estimated Date of Delivery: Oct 29, 2023. She presents with complaints of contractions that started at midnight.  No lof.  No bleeding  baby was moving well.    This pregnancy was complicated by hepatitis b with low viral load, gbs positive, and diet  controlled gestational diabetes.    She has a prior history of  with her twin pregnancies, has had a successful  since that time.  She declined an ob consult this time, but did sign a tolac consent.      Patient Active Problem List    Diagnosis Date Noted    Normal labor 10/27/2023     Priority: Medium    Diet controlled gestational diabetes mellitus (GDM), antepartum 10/27/2023     Priority: Medium    History of  10/15/2020     Priority: Medium    High-risk pregnancy in second trimester 10/15/2020     Priority: Medium    Short interval between pregnancies affecting pregnancy, antepartum 10/15/2020     Priority: Medium    Viral hepatitis B chronic (H) 2019     Priority: Medium     delivery delivered 2017     Priority: Medium    Anemia of pregnancy 2013     Priority: Medium      OB History    Para Term  AB Living   5 4 4 0 0 5   SAB IAB Ectopic Multiple  Live Births   0 0 0 1 5      # Outcome Date GA Lbr Yonas/2nd Weight Sex Delivery Anes PTL Lv   5 Current            4 Term 21 40w1d  3.739 kg (8 lb 3.9 oz) M  None N MARTHA      Name: Janice Marc      Apgar1: 9  Apgar5: 9   3 Term 20 40w6d  3.771 kg (8 lb 5 oz) M  IV N MARTHA      Birth Comments: Complications: Nuchal, tight,delivered through      Complications: Nuchal cord affecting delivery      Name: Jeramy Marc      Apgar1: 8  Apgar5: 9   2A Term 17 38w0d  2.807 kg (6 lb 3 oz) M CS-LTranv Spinal N MARTHA      Name: JASENMALE A-MT      Apgar1: 8  Apgar5: 9   2B Term 17 38w0d  2.67 kg (5 lb 14.2 oz) M CS-Unspec Spinal N MARTHA      Name: JASEN,MALE B-MT      Apgar1: 9  Apgar5: 9   1 Term 13     Vag-Spont   MARTHA     Review of Systems Negative except what is noted in HPI.   Past Medical History:   Diagnosis Date    Diabetes (H)     Gestational diabetes    High-risk pregnancy in third trimester 2019    Viral hepatitis B chronic (H)       Past Surgical History:   Procedure Laterality Date    APPENDECTOMY  2020    C/SECTION, LOW TRANSVERSE       SECTION N/A 2017    Procedure:  SECTION;  Surgeon: Debbie Sloan DO;  Location: [facility];  Service:     DILATION AND CURETTAGE, OPERATIVE HYSTEROSCOPY, COMBINED N/A 2022    Procedure: HYSTEROSCOPY, WITH DILATION AND CURETTAGE OF UTERUS;  Surgeon: Margi Feng MD;  Location: [facility] OR    WI LAP,APPENDECTOMY N/A 2020    Procedure: APPENDECTOMY, LAPAROSCOPIC;  Surgeon: Jeramy Gonzales MD;  Location: [facility] [place];  Service: General   No known allergies  Family History   Problem Relation Age of Onset    Family History Negative No family hx of     Cancer No family hx of     Heart Disease No family hx of     Coronary Artery Disease No family hx of     Diabetes No family hx of     Hypertension No family hx of      Social History     Socioeconomic History    Marital status:  Single     Spouse name: Not on file    Number of children: Not on file    Years of education: Not on file    Highest education level: Not on file   Occupational History    Not on file   Tobacco Use    Smoking status: Never     Passive exposure: Yes    Smokeless tobacco: Never    Tobacco comments:     vapor exposure    Substance and Sexual Activity    Alcohol use: Not Currently    Drug use: Never    Sexual activity: Not on file   Other Topics Concern    Not on file   Social History Narrative    Not on file     Social Determinants of Health     Financial Resource Strain: Not on file   Food Insecurity: Not on file   Transportation Needs: Not on file   Physical Activity: Not on file   Stress: Not on file   Social Connections: Not on file   Interpersonal Safety: Not on file   Housing Stability: Not on file     Prior to Admission Medication List  Medications Prior to Admission   Medication Sig Dispense Refill Last Dose    ferrous sulfate (FEROSUL) 325 (65 Fe) MG tablet Take 1 tablet (325 mg) by mouth daily (with breakfast) 90 tablet 1     Prenatal Vit-Fe Fumarate-FA (PRENATAL MULTIVITAMIN W/IRON) 27-0.8 MG tablet Take 1 tablet by mouth daily 90 tablet 1       Allergies  Allergies   Allergen Reactions    No Known Allergies      Immunization History   Administered Date(s) Administered    COVID-19 Monovalent 18+ (Moderna) 03/11/2021, 04/08/2021    DT (PEDS <7y) 12/28/2005    DTaP, Unspecified 09/15/2017    HepA, Unspecified 12/28/2005    HepB, Unspecified 1990    Influenza Vaccine >6 months (Alfuria,Fluzone) 11/01/2019    MMR 1990    TDAP (Adacel,Boostrix) 01/04/2021    TDAP Vaccine (Boostrix) 12/16/2019    Tdap (Adult) Unspecified Formulation 12/28/2005      Physical Exam  Patient Vitals for the past 24 hrs:   BP Temp Temp src Pulse Resp Height Weight   10/27/23 1559 94/53 -- -- -- 16 -- --   10/27/23 1437 (!) 86/52 98.1  F (36.7  C) Oral 75 16 -- --   10/27/23 1010 -- -- -- -- -- 1.524 m (5') 65.3 kg (144 lb)    10/27/23 0916 99/60 98  F (36.7  C) -- 69 18 -- --     Wt Readings from Last 1 Encounters:   10/27/23 65.3 kg (144 lb)   Prepregnancy: Pregravid weight not on file, BMI Could not be calculated. Total gain: Not found. (expected gain: Could not be calculated).    HEART: RRR, no murmur  LUNGS: CTA bilaterally  Fetal Heart Tones: Baseline 140 bpm, variability moderate (6-25 bpm), variable decelerations. Reactive.  CONTRACTIONS:  regular, every 2 minutes.  CERVIX: dilation 6 cm , 90% effaced, soft, and +1 station.  FLUID: Clear.  Fetal Presentation: vertex.  Labs    Group B Strep PCR   Date Value Ref Range Status   10/05/2023 Positive (A) Negative Final     Comment:     ALERT: Streptococcus agalactiae (Group B Streptococcus) has a high rate of resistance to clindamycin. Therefore, clindamycin is not recommended for treatment unless susceptibility testing has been performed.      Antibody Screen   Date Value Ref Range Status   10/27/2023 Negative Negative Final     Hepatitis B Surface Antigen   Date Value Ref Range Status   03/28/2023 Reactive (A) Nonreactive Final     Comment:     Confirmed Reactive     Neisseria gonorrhoeae   Date Value Ref Range Status   04/12/2020 Negative Negative Final     Hemoglobin   Date Value Ref Range Status   10/27/2023 10.3 (L) 11.7 - 15.7 g/dL Final      Admission on 10/27/2023   Component Date Value Ref Range Status    Hemoglobin 10/27/2023 10.3 (L)  11.7 - 15.7 g/dL Final    Treponema Antibody Total 10/27/2023 Nonreactive  Nonreactive Final    ABO/RH(D) 10/27/2023 O POS   Final    Antibody Screen 10/27/2023 Negative  Negative Final    SPECIMEN EXPIRATION DATE 10/27/2023 [id number]   Final    Hemoglobin A1C 10/27/2023 5.8 (H)  <5.7 % Final    Normal <5.7%   Prediabetes 5.7-6.4%    Diabetes 6.5% or higher     Note: Adopted from ADA consensus guidelines.    GLUCOSE BY METER POCT 10/27/2023 79  70 - 99 mg/dL Final    GLUCOSE BY METER POCT 10/27/2023 80  70 - 99 mg/dL Final    GLUCOSE BY  METER POCT 10/27/2023 105 (H)  70 - 99 mg/dL Final        Completed by:   Lynette Brock MD    10/27/2023 6:11 PM   used: Not needed.

## 2023-10-27 NOTE — PROGRESS NOTES
Late Entry:   At 1400 SVE done and remains the same. Dr. Brock updated via phone, discussed FHR tracing, contraction pattern, and patient's comfort. Plan to start oxytocin for labor augmentation.

## 2023-10-27 NOTE — CONSULTS
CONSULTATION - OB  (Late entry - patient seen around 1:00 pm)    NAME:Yo Aggarwal  : 1990  MRN: [id number]  GESTATIONAL AGE: 39w 5d     [facility]    ADMISSION DATE: 10/27/2023    PCP:  Florinda Aggarwal     CHIEF COMPLAINT: Labor    HPI: I have been requested by Dr. De La Rosa to evaluate Yo Aggarwal for TOLAC. Patient is a 33 year old,  female. History obtained through the patient and chart review. She presented to L+D in labor.    DIAGNOSIS COMPLICATING PREGNANCY  Prior C/S x 1 - Twins,  x 2. Op report reviewed  Hepatitis B - low viral load  GBS positive  A1 GDM    OBSTETRICAL HISTORY :     OB History    Para Term  AB Living   5 5 5 0 0 6   SAB IAB Ectopic Multiple Live Births   0 0 0 1 6      # Outcome Date GA Lbr Yonas/2nd Weight Sex Delivery Anes PTL Lv   5 Term 10/27/23 39w5d 00:30 / 00:02 3.22 kg (7 lb 1.6 oz) M Vag-Spont None N MARTHA      Name: Male-Yo Aggarwal      Apgar1: 9  Apgar5: 9   4 Term 21 40w1d  3.739 kg (8 lb 3.9 oz) M  None N MARTHA      Name: Janice Marc      Apgar1: 9  Apgar5: 9   3 Term 20 40w6d  3.771 kg (8 lb 5 oz) M  IV N MARTHA      Birth Comments: Complications: Nuchal, tight,delivered through      Complications: Nuchal cord affecting delivery      Name: Jeramy Marc      Apgar1: 8  Apgar5: 9   2A Term 17 38w0d  2.807 kg (6 lb 3 oz) M CS-LTranv Spinal N MARTHA      Name: GRACE AGGARWAL      Apgar1: 8  Apgar5: 9   2B Term 17 38w0d  2.67 kg (5 lb 14.2 oz) M CS-Unspec Spinal N MARTHA      Name: GRACE AGGARWAL      Apgar1: 9  Apgar5: 9   1 Term 13     Vag-Spont   MARTHA        PAST MEDICAL HISTORY :  Past Medical History:   Diagnosis Date    Diabetes (H)     Gestational diabetes    High-risk pregnancy in third trimester 2019    Viral hepatitis B chronic (H)        PAST SURGICAL HISTORY:  Past Surgical History:   Procedure Laterality Date    APPENDECTOMY  2020    C/SECTION, LOW TRANSVERSE       SECTION N/A 2017     Procedure:  SECTION;  Surgeon: Debbie Sloan DO;  Location: [facility] OR;  Service:     DILATION AND CURETTAGE, OPERATIVE HYSTEROSCOPY, COMBINED N/A 2022    Procedure: HYSTEROSCOPY, WITH DILATION AND CURETTAGE OF UTERUS;  Surgeon: Margi Feng MD;  Location: [facility] OR    AR LAP,APPENDECTOMY N/A 2020    Procedure: APPENDECTOMY, LAPAROSCOPIC;  Surgeon: Jeramy Gonzales MD;  [place] [facility]  Service: General     SOCIAL HISTORY :  Social History     Socioeconomic History    Marital status: Single     Spouse name: Not on file    Number of children: Not on file    Years of education: Not on file    Highest education level: Not on file   Occupational History    Not on file   Tobacco Use    Smoking status: Never     Passive exposure: Yes    Smokeless tobacco: Never    Tobacco comments:     vapor exposure    Substance and Sexual Activity    Alcohol use: Not Currently    Drug use: Never    Sexual activity: Not on file   Other Topics Concern    Not on file   Social History Narrative    Not on file     Social Determinants of Health     Financial Resource Strain: Not on file   Food Insecurity: Not on file   Transportation Needs: Not on file   Physical Activity: Not on file   Stress: Not on file   Social Connections: Not on file   Interpersonal Safety: Not on file   Housing Stability: Not on file       MEDICATIONS:  Current Facility-Administered Medications   Medication    carboprost (HEMABATE) injection 250 mcg    glucose gel 15-30 g    Or    dextrose 50 % injection 25-50 mL    Or    glucagon injection 1 mg    ketorolac (TORADOL) injection 30 mg    Or    ketorolac (TORADOL) injection 30 mg    Or    ibuprofen (ADVIL/MOTRIN) tablet 800 mg    lactated ringers BOLUS 500 mL    lactated ringers infusion    lidocaine (LMX4) cream    lidocaine 1 % 0.1-1 mL    lidocaine 1 % 0.1-20 mL    Medication Instructions - cervical ripening and induction medications    methylergonovine  (METHERGINE) injection 200 mcg    metoclopramide (REGLAN) injection 10 mg    Or    metoclopramide (REGLAN) tablet 10 mg    misoprostol (CYTOTEC) tablet 400 mcg    Or    misoprostol (CYTOTEC) tablet 800 mcg    naloxone (NARCAN) injection 0.2 mg    Or    naloxone (NARCAN) injection 0.4 mg    Or    naloxone (NARCAN) injection 0.2 mg    Or    naloxone (NARCAN) injection 0.4 mg    ondansetron (ZOFRAN ODT) ODT tab 4 mg    Or    ondansetron (ZOFRAN) injection 4 mg    oxytocin (PITOCIN) 30 units in 500 mL 0.9% NaCl infusion    oxytocin (PITOCIN) 30 units in 500 mL 0.9% NaCl infusion    oxytocin (PITOCIN) 30 units in 500 mL 0.9% NaCl infusion    oxytocin (PITOCIN) injection 10 Units    oxytocin (PITOCIN) injection 10 Units    penicillin G potassium 3 Million Units in D5W 50 mL intermittent infusion    prochlorperazine (COMPAZINE) injection 10 mg    Or    prochlorperazine (COMPAZINE) tablet 10 mg    Or    prochlorperazine (COMPAZINE) suppository 25 mg    sodium chloride (PF) 0.9% PF flush 3 mL    sodium chloride (PF) 0.9% PF flush 3 mL    sodium citrate-citric acid (BICITRA) solution 30 mL    tranexamic acid (CYKLOKAPRON) bolus 1 g vial attach to NaCl 50 or 100 mL bag ADULT       ALLERGIES:  Allergies   Allergen Reactions    No Known Allergies        REVIEW OF SYSTEMS   Negative except what is stated in the HPI    PHYSICAL EXAM:  BP 94/62   Pulse 75   Temp 97.2  F (36.2  C) (Axillary)   Resp 20   Ht 1.524 m (5')   Wt 65.3 kg (144 lb)   LMP 01/22/2023   BMI 28.12 kg/m     General Appearance: Alert, appropriate appearance for age. No acute distress,   HEENT Exam: Grossly normal.,   Chest/Respiratory: Clear to auscultation.  Cardiovascular: Regular rate and rhythm  Gastrointestinal Exam: gravid, soft, non-tender,   Cervix: 5 cm  Membranes: intact  Lymphatic: Non-palpable nodes in inguinal regions.,   Musculoskeletal: moving all four extremities    Skin: no rash or abnormalities,   Neurologic: Normal gait and speech,     Psychiatric: Alert and oriented, appropriate affect.    LABS:  Lab Results   Component Value Date    HGB 10.3 (L) 10/27/2023     IMPRESSION:  33 year old  at 39w 5d presented in labor  -- Prior C/S x 1 - C/S for twins. Op report reviewed LTCS with two layer closure. No uterine extension.  x 2. Previously signed consent with Dr. De La Rosa.   -- Risks and benefits of TOLAC reviewed including bleeding, infection, uterine rupture, need for further surgery.   -- I am available if needed.     Thank you for allowing us to participate in the care of this patient.  Please contact us with any questions/concerns.      Margi Feng MD   Office [phone]    CC: Dr. Brock

## 2023-10-28 LAB
BLD PROD TYP BPU: NORMAL
BLOOD COMPONENT TYPE: NORMAL
CODING SYSTEM: NORMAL
CROSSMATCH: NORMAL
HGB BLD-MCNC: 7.9 G/DL (ref 11.7–15.7)
HGB BLD-MCNC: 8 G/DL (ref 11.7–15.7)
HOLD SPECIMEN: NORMAL
ISSUE DATE AND TIME: NORMAL
PLATELET # BLD AUTO: 216 10E3/UL (ref 150–450)
UNIT ABO/RH: NORMAL
UNIT NUMBER: NORMAL
UNIT STATUS: NORMAL
UNIT TYPE ISBT: 5100

## 2023-10-28 PROCEDURE — P9016 RBC LEUKOCYTES REDUCED: HCPCS | Performed by: FAMILY MEDICINE

## 2023-10-28 PROCEDURE — 85018 HEMOGLOBIN: CPT | Performed by: FAMILY MEDICINE

## 2023-10-28 PROCEDURE — 36415 COLL VENOUS BLD VENIPUNCTURE: CPT | Performed by: FAMILY MEDICINE

## 2023-10-28 PROCEDURE — 120N000001 HC R&B MED SURG/OB

## 2023-10-28 PROCEDURE — 85049 AUTOMATED PLATELET COUNT: CPT | Performed by: FAMILY MEDICINE

## 2023-10-28 PROCEDURE — 250N000013 HC RX MED GY IP 250 OP 250 PS 637: Performed by: FAMILY MEDICINE

## 2023-10-28 PROCEDURE — 258N000003 HC RX IP 258 OP 636: Performed by: FAMILY MEDICINE

## 2023-10-28 RX ORDER — SODIUM CHLORIDE, SODIUM LACTATE, POTASSIUM CHLORIDE, CALCIUM CHLORIDE 600; 310; 30; 20 MG/100ML; MG/100ML; MG/100ML; MG/100ML
INJECTION, SOLUTION INTRAVENOUS CONTINUOUS
Status: DISCONTINUED | OUTPATIENT
Start: 2023-10-28 | End: 2023-10-29 | Stop reason: HOSPADM

## 2023-10-28 RX ADMIN — ACETAMINOPHEN 650 MG: 325 TABLET ORAL at 00:36

## 2023-10-28 RX ADMIN — DOCUSATE SODIUM 100 MG: 100 CAPSULE, LIQUID FILLED ORAL at 09:07

## 2023-10-28 RX ADMIN — ACETAMINOPHEN 650 MG: 325 TABLET ORAL at 19:56

## 2023-10-28 RX ADMIN — SODIUM CHLORIDE, POTASSIUM CHLORIDE, SODIUM LACTATE AND CALCIUM CHLORIDE 500 ML: 600; 310; 30; 20 INJECTION, SOLUTION INTRAVENOUS at 01:11

## 2023-10-28 RX ADMIN — ACETAMINOPHEN 650 MG: 325 TABLET ORAL at 06:09

## 2023-10-28 RX ADMIN — SODIUM CHLORIDE, POTASSIUM CHLORIDE, SODIUM LACTATE AND CALCIUM CHLORIDE: 600; 310; 30; 20 INJECTION, SOLUTION INTRAVENOUS at 13:24

## 2023-10-28 ASSESSMENT — ACTIVITIES OF DAILY LIVING (ADL)
ADLS_ACUITY_SCORE: 18

## 2023-10-28 NOTE — PROGRESS NOTES
Update, spoke with Dr Brock and then with patient's RN, Ashley HEATON officially 300 but hgb dropped from 10 to 8 and pt endorses dizziness even lying down and very hypotensive with SBP in 70s  Fundus firm and does not appear to have ongoing significant blood loss, only 1 significant clot since AM  Recommend transfusion 1 unit now. May need iron infusion but postpone this pending RBCs  Recheck hgb now, and again in AM  Continue LR fluids  Consider US to look for retained products of conception, but for right now exam and lochia reassuring    Geri Barnes MD

## 2023-10-28 NOTE — PROGRESS NOTES
Pt reports voiding and passing a clot. Clot remained in hat and was found to be silver dollar sized. Bleeding scant, fundus firm and midline. Instructed pt to call with any further clots, increase in vaginal bleeding, or cramping. Pt verbalizes understanding. Will continue to monitor.

## 2023-10-28 NOTE — PLAN OF CARE
Goal Outcome Evaluation:         Pt was symptomatic and hypotensive this afternoon. IV fluid was resumed and blood transfusion started. She denied having pain.

## 2023-10-28 NOTE — PLAN OF CARE
Problem: Adult Inpatient Plan of Care  Goal: Optimal Comfort and Wellbeing  Outcome: Progressing  Intervention: Monitor Pain and Promote Comfort  Recent Flowsheet Documentation  Taken 10/28/2023 0036 by Brenda Nguyen RN  Pain Management Interventions:   medication (see MAR)   cold applied  Intervention: Provide Person-Centered Care  Recent Flowsheet Documentation  Taken 10/28/2023 0036 by Brenda Nguyen RN  Trust Relationship/Rapport:   care explained   choices provided   empathic listening provided   questions answered   questions encouraged   thoughts/feelings acknowledged     Problem: Labor  Goal: Acceptable Pain Control  Outcome: Progressing    Goal Outcome Evaluation:  Mother having low BP. MD was notified. Per MD request, 500 mL of LR was initated and hemoglobin AM draw.  Stated to have a pain of 7/10 due to dizziness and headache, tylenol PRN given and cold cloth applied to head. Encouraged mother to drink lots of fluids.   Bonding well with baby through feedings, changed diapers, skin to skin contact, and holding.   Mother is formula feeding baby.  Educated the importance of feeding baby every 2-3 hours.   Mother and father are very attentive to infant cares and asking appropriate questions.    Brenda Nguyen RN on 10/28/2023 at 4:59 AM

## 2023-10-28 NOTE — PROGRESS NOTES
[facility]    Post-Partum Progress Note    Assessment & Plan   Assessment:  Post-partum day #1  Normal spontaneous vaginal delivery    No obvious excessive bleeding, but hypotensive to 75/42 and hgb of 8.0    Plan:  Anticipate discharge tomorrow as long as doing well  LR 1 L now  Recheck hgb this pm  Iron infusion planned today per Dr Brock      Interval History   Feels dizzy, when up and also now while lying down. Pain is adequately controlled.  No fevers.  No history of foul-smelling vaginal discharge.  Good appetite.  Denies chest pain, shortness of breath, nausea or vomiting.  Vaginal bleeding is similar to a heavy menstrual flow, reports 1 small clot this AM.  Formula feeding.     Medications    lactated ringers 250 mL/hr at 10/28/23 1324    - MEDICATION INSTRUCTIONS -      - MEDICATION INSTRUCTIONS -      oxytocin in 0.9% NaCl      oxytocin        docusate sodium  100 mg Oral Daily    iron sucrose  300 mg Intravenous Once       Physical Exam   Temp: 97.8  F (36.6  C) Temp src: Oral BP: (!) 75/42 Pulse: 76   Resp: 18 SpO2: 97 % O2 Device: None (Room air)    Vitals:    10/27/23 1010   Weight: 65.3 kg (144 lb)     Vital Signs with Ranges  Temp:  [97.2  F (36.2  C)-98.2  F (36.8  C)] 97.8  F (36.6  C)  Pulse:  [] 76  Resp:  [16-22] 18  BP: ()/(42-75) 75/42  SpO2:  [97 %-100 %] 97 %  I/O last 3 completed shifts:  In: 775 [IV Piggyback:775]  Out: 907 [Urine:600; Blood:307]    Uterine fundus is firm, non-tender and at the level of the umbilicus; feels lumpy to palpation and uterus is mobile, but is firm  Well appearing but perhaps a little weak appearing lying in bed  Affect appropriate    Data   Recent Labs   Lab Test 10/27/23  0948   AS Negative     Recent Labs   Lab Test 10/28/23  0541 10/27/23  0948   HGB 8.0* 10.3*     Recent Labs   Lab Test 03/28/23  1323   RUQIGG Positive       Geri Barnes MD   [facility]

## 2023-10-28 NOTE — PROGRESS NOTES
Pt appeared comfortable resting in bed but c/o feeling some dizziness when being up. She denied having dizziness when being up this morning. Her blood pressures were in 70's/40's. She was not tachy and her oxygen sat was normal.   Dr Brock was notified about pt's changed condition (being dizziness, hgb of 8 and hypotensive). Orders were given.

## 2023-10-29 VITALS
OXYGEN SATURATION: 97 % | TEMPERATURE: 98.1 F | WEIGHT: 144 LBS | SYSTOLIC BLOOD PRESSURE: 99 MMHG | BODY MASS INDEX: 28.27 KG/M2 | HEIGHT: 60 IN | DIASTOLIC BLOOD PRESSURE: 62 MMHG | HEART RATE: 67 BPM | RESPIRATION RATE: 16 BRPM

## 2023-10-29 PROBLEM — I95.1 ORTHOSTATIC HYPOTENSION: Status: ACTIVE | Noted: 2023-10-29

## 2023-10-29 PROBLEM — O34.219 VBAC, DELIVERED, CURRENT HOSPITALIZATION: Status: ACTIVE | Noted: 2023-10-29

## 2023-10-29 PROBLEM — D62 ANEMIA DUE TO BLOOD LOSS, ACUTE: Status: ACTIVE | Noted: 2023-10-29

## 2023-10-29 PROBLEM — I95.1 ORTHOSTATIC HYPOTENSION: Status: RESOLVED | Noted: 2023-10-29 | Resolved: 2023-10-29

## 2023-10-29 LAB — HGB BLD-MCNC: 9.3 G/DL (ref 11.7–15.7)

## 2023-10-29 PROCEDURE — 36415 COLL VENOUS BLD VENIPUNCTURE: CPT | Performed by: FAMILY MEDICINE

## 2023-10-29 PROCEDURE — 250N000013 HC RX MED GY IP 250 OP 250 PS 637: Performed by: FAMILY MEDICINE

## 2023-10-29 PROCEDURE — 85018 HEMOGLOBIN: CPT | Performed by: FAMILY MEDICINE

## 2023-10-29 RX ORDER — IBUPROFEN 800 MG/1
800 TABLET, FILM COATED ORAL EVERY 6 HOURS PRN
Start: 2023-10-29

## 2023-10-29 RX ORDER — ACETAMINOPHEN 325 MG/1
650 TABLET ORAL EVERY 4 HOURS PRN
Start: 2023-10-29

## 2023-10-29 RX ADMIN — DOCUSATE SODIUM 100 MG: 100 CAPSULE, LIQUID FILLED ORAL at 08:59

## 2023-10-29 RX ADMIN — ACETAMINOPHEN 650 MG: 325 TABLET ORAL at 06:34

## 2023-10-29 ASSESSMENT — ACTIVITIES OF DAILY LIVING (ADL)
ADLS_ACUITY_SCORE: 18

## 2023-10-29 NOTE — PLAN OF CARE
Goal Outcome Evaluation:  Patient VSS and afebrile this shift. Pain is being managed with PRN tylenol and rest. This shift, blood pressures have been within patient's baseline/normal range. Fundus firm, midline and at U. Patient denies having shortness of breath and lightheadedness. Denies preeclampsia symptoms. Patient  once and has fed baby formula. Ambulating independently and voiding without reported difficulty. Writer/RN walked in room at 0630. Patient was dozing off and had baby in bed. Nurse placed baby in bassinet and educated patient on safe sleep practices of keeping baby in bassinet on his back and without anything in the crib. Patient verbalized understanding. Significant other present and attentive at bedside. Anticipating discharge today.    AM Hgb 9.3.     Problem: Adult Inpatient Plan of Care  Goal: Plan of Care Review  Description: The Plan of Care Review/Shift note should be completed every shift.  The Outcome Evaluation is a brief statement about your assessment that the patient is improving, declining, or no change.  This information will be displayed automatically on your shift  note.  Outcome: Progressing     Problem: Postpartum (Vaginal Delivery)  Goal: Successful Parent Role Transition  Outcome: Progressing     Problem: Postpartum (Vaginal Delivery)  Goal: Hemostasis  Outcome: Progressing     Problem: Postpartum (Vaginal Delivery)  Goal: Optimal Pain Control and Function  Outcome: Progressing  Intervention: Prevent or Manage Pain  Recent Flowsheet Documentation  Taken 10/28/2023 2323 by Jacqueline Zhu, RN  Perineal Care:   perineal spray bottle/warm water use encouraged   perineal hygiene encouraged  Taken 10/28/2023 1956 by Jacqueline Zhu RN  Pain Management Interventions: medication (see MAR)

## 2023-10-29 NOTE — PROVIDER NOTIFICATION
This writer/RN spoke with Dr. Geri Barnes and verified to not give the scheduled dose of IV iron this evening. Pt received a blood transfusion this evening. Her last BP was 83/57, asymptomatic. IV fluid bolus infusing. Pt will have her Hgb checked in the morning.

## 2023-10-29 NOTE — DISCHARGE INSTRUCTIONS
Warning Signs after Having a Baby    Keep this paper on your fridge or somewhere else where you can see it.    Call your provider if you have any of these symptoms up to 12 weeks after having your baby.    Thoughts of hurting yourself or your baby  Pain in your chest or trouble breathing  Severe headache not helped by pain medicine  Eyesight concerns (blurry vision, seeing spots or flashes of light, other changes to eyesight)  Fainting, shaking or other signs of a seizure    Call 9-1-1 if you feel that it is an emergency.     The symptoms below can happen to anyone after giving birth. They can be very serious. Call your provider if you have any of these warning signs.    My provider s phone number: _______________________    Losing too much blood (hemorrhage)    Call your provider if you soak through a pad in less than an hour or pass blood clots bigger than a golf ball. These may be signs that you are bleeding too much.    Blood clots in the legs or lungs    After you give birth, your body naturally clots its blood to help prevent blood loss. Sometimes this increased clotting can happen in other areas of the body, like the legs or lungs. This can block your blood flow and be very dangerous.     Call your provider if you:  Have a red, swollen spot on the back of your leg that is warm or painful when you touch it.   Are coughing up blood.     Infection    Call your provider if you have any of these symptoms:  Fever of 100.4 F (38 C) or higher.  Pain or redness around your stitches if you had an incision.   Any yellow, white, or green fluid coming from places where you had stitches or surgery.    Mood Problems (postpartum depression)    Many people feel sad or have mood changes after having a baby. But for some people, these mood swings are worse.     Call your provider right away if you feel so anxious or nervous that you can't care for yourself or your baby.    Preeclampsia (high blood pressure)    Even if you  didn't have high blood pressure when you were pregnant, you are at risk for the high blood pressure disease called preeclampsia. This risk can last up to 12 weeks after giving birth.     Call your provider if you have:   Pain on your right side under your rib cage  Sudden swelling in the hands and face    Remember: You know your body. If something doesn't feel right, get medical help.     For informational purposes only. Not to replace the advice of your health care provider. Copyright 2020 [employer]. All rights reserved. Clinically reviewed by Candida Garnett, RNC-OB, MSN. [employer] 681924 - Rev 02/23.    Warning Signs after Having a Baby    Keep this paper on your fridge or somewhere else where you can see it.    Call your provider if you have any of these symptoms up to 12 weeks after having your baby.    Thoughts of hurting yourself or your baby  Pain in your chest or trouble breathing  Severe headache not helped by pain medicine  Eyesight concerns (blurry vision, seeing spots or flashes of light, other changes to eyesight)  Fainting, shaking or other signs of a seizure    Call 9-1-1 if you feel that it is an emergency.     The symptoms below can happen to anyone after giving birth. They can be very serious. Call your provider if you have any of these warning signs.    My provider s phone number: _______________________    Losing too much blood (hemorrhage)    Call your provider if you soak through a pad in less than an hour or pass blood clots bigger than a golf ball. These may be signs that you are bleeding too much.    Blood clots in the legs or lungs    After you give birth, your body naturally clots its blood to help prevent blood loss. Sometimes this increased clotting can happen in other areas of the body, like the legs or lungs. This can block your blood flow and be very dangerous.     Call your provider if you:  Have a red, swollen spot on the back of your leg that is warm or painful  when you touch it.   Are coughing up blood.     Infection    Call your provider if you have any of these symptoms:  Fever of 100.4 F (38 C) or higher.  Pain or redness around your stitches if you had an incision.   Any yellow, white, or green fluid coming from places where you had stitches or surgery.    Mood Problems (postpartum depression)    Many people feel sad or have mood changes after having a baby. But for some people, these mood swings are worse.     Call your provider right away if you feel so anxious or nervous that you can't care for yourself or your baby.    Preeclampsia (high blood pressure)    Even if you didn't have high blood pressure when you were pregnant, you are at risk for the high blood pressure disease called preeclampsia. This risk can last up to 12 weeks after giving birth.     Call your provider if you have:   Pain on your right side under your rib cage  Sudden swelling in the hands and face    Remember: You know your body. If something doesn't feel right, get medical help.     For informational purposes only. Not to replace the advice of your health care provider. Copyright 2020 [facility]. All rights reserved. Clinically reviewed by Candida Garnett, RNC-OB, MSN. [employer] [id number] - Rev 02/23.

## 2023-10-29 NOTE — PLAN OF CARE
Goal Outcome Evaluation:         Pt is stable status and will be discharged with her baby later.

## 2023-10-29 NOTE — DISCHARGE SUMMARY
[facility]    Discharge Summary  Obstetrics    Date of Admission:  10/27/2023  Date of Discharge:  10/29/2023  Discharging Provider: Geri Barnes MD    Discharge Diagnoses   Patient Active Problem List    Diagnosis Date Noted    Anemia due to blood loss, acute 10/29/2023     Priority: Medium    , delivered, current hospitalization 10/29/2023     Priority: Medium    Normal labor 10/27/2023     Priority: Medium    Diet controlled gestational diabetes mellitus (GDM), antepartum 10/27/2023     Priority: Medium    History of  10/15/2020     Priority: Medium    High-risk pregnancy in second trimester 10/15/2020     Priority: Medium    Short interval between pregnancies affecting pregnancy, antepartum 10/15/2020     Priority: Medium    Viral hepatitis B chronic (H) 2019     Priority: Medium     delivery delivered 2017     Priority: Medium    Anemia of pregnancy 2013     Priority: Medium        History of Present Illness   Yo Aragon is a 33 year old female who presented with uterine contractions. She had a vaginal delivery, , of a baby boy. Pregnancy complications: hepatitis B with low viral load, GBS positive with adequate prophylaxis, and diet controlled gestational diabetes mellitus.    Hospital Course   The patient's hospital course was significant for hypotension with lowest SBP 68 and lowest DBP 42, and anemia with lowest hgb 7.9. She was treated with 1 L LR and 1 unit PRBCs, with improvement in BP to SBPs of 90s-110s.  On discharge, her pain was well controlled. Vaginal bleeding is similar to peak menstrual flow.  Voiding without difficulty.  Ambulating well and tolerating a normal diet.  No fevers.  Breastfeeding a little, primarily formula feeding till milk comes in but planning to exclusively breastfeed 1 month till she goes back to work and pump thereafter, did this with her prior children.  Infant is stable.  She was discharged on post-partum  day #2.    Post-partum hemoglobin:   Hemoglobin   Date Value Ref Range Status   10/29/2023 9.3 (L) 11.7 - 15.7 g/dL Final   02/01/2021 9.6 (L) 11.7 - 15.7 g/dL Final       [place] Depression Scale  Thoughts of Harming Self:    Total Score:        Discharge Disposition   Discharged to home   Condition at discharge: Good    Primary Care Physician   Florinda Aragon    Consultations This Hospital Stay   OB GYN IP CONSULT  LACTATION IP CONSULT    Discharge Orders   No discharge procedures on file.  Discharge Medications   Current Discharge Medication List        CONTINUE these medications which have NOT CHANGED    Details   ferrous sulfate (FEROSUL) 325 (65 Fe) MG tablet Take 1 tablet (325 mg) by mouth daily (with breakfast)  Qty: 90 tablet, Refills: 1    Associated Diagnoses: Routine postpartum follow-up      Prenatal Vit-Fe Fumarate-FA (PRENATAL MULTIVITAMIN W/IRON) 27-0.8 MG tablet Take 1 tablet by mouth daily  Qty: 90 tablet, Refills: 1    Associated Diagnoses: Routine postpartum follow-up           Allergies   Allergies   Allergen Reactions    No Known Allergies        Geri Barnes MD  [facility]

## 2023-12-11 ENCOUNTER — LAB REQUISITION (OUTPATIENT)
Dept: LAB | Facility: CLINIC | Age: 33
End: 2023-12-11
Payer: COMMERCIAL

## 2023-12-11 PROCEDURE — G0145 SCR C/V CYTO,THINLAYER,RESCR: HCPCS | Mod: ORL | Performed by: FAMILY MEDICINE

## 2023-12-11 PROCEDURE — 87624 HPV HI-RISK TYP POOLED RSLT: CPT | Mod: ORL | Performed by: FAMILY MEDICINE

## 2023-12-14 LAB
BKR LAB AP GYN ADEQUACY: NORMAL
BKR LAB AP GYN INTERPRETATION: NORMAL
BKR LAB AP HPV REFLEX: NORMAL
BKR LAB AP LMP: NORMAL
BKR LAB AP PREVIOUS ABNORMAL: NORMAL
PATH REPORT.COMMENTS IMP SPEC: NORMAL
PATH REPORT.COMMENTS IMP SPEC: NORMAL
PATH REPORT.RELEVANT HX SPEC: NORMAL

## 2023-12-15 LAB
HUMAN PAPILLOMA VIRUS 16 DNA: NEGATIVE
HUMAN PAPILLOMA VIRUS 18 DNA: NEGATIVE
HUMAN PAPILLOMA VIRUS FINAL DIAGNOSIS: NORMAL
HUMAN PAPILLOMA VIRUS OTHER HR: NEGATIVE

## 2024-02-17 ENCOUNTER — HEALTH MAINTENANCE LETTER (OUTPATIENT)
Age: 34
End: 2024-02-17

## 2024-08-30 ENCOUNTER — LAB REQUISITION (OUTPATIENT)
Dept: LAB | Facility: CLINIC | Age: 34
End: 2024-08-30
Payer: COMMERCIAL

## 2024-08-30 DIAGNOSIS — N91.2 AMENORRHEA, UNSPECIFIED: ICD-10-CM

## 2024-08-30 LAB
HCG INTACT+B SERPL-ACNC: <1 MIU/ML
TSH SERPL DL<=0.005 MIU/L-ACNC: 0.66 UIU/ML (ref 0.3–4.2)

## 2024-08-30 PROCEDURE — 84702 CHORIONIC GONADOTROPIN TEST: CPT | Mod: ORL | Performed by: FAMILY MEDICINE

## 2024-08-30 PROCEDURE — 84443 ASSAY THYROID STIM HORMONE: CPT | Mod: ORL | Performed by: FAMILY MEDICINE

## 2025-02-24 ENCOUNTER — OFFICE VISIT (OUTPATIENT)
Dept: URGENT CARE | Facility: URGENT CARE | Age: 35
End: 2025-02-24
Payer: COMMERCIAL

## 2025-02-24 ENCOUNTER — HOSPITAL ENCOUNTER (OUTPATIENT)
Dept: CT IMAGING | Facility: HOSPITAL | Age: 35
Discharge: HOME | End: 2025-02-24
Payer: COMMERCIAL

## 2025-02-24 VITALS
DIASTOLIC BLOOD PRESSURE: 74 MMHG | SYSTOLIC BLOOD PRESSURE: 107 MMHG | TEMPERATURE: 98.9 F | HEART RATE: 98 BPM | OXYGEN SATURATION: 99 % | RESPIRATION RATE: 19 BRPM

## 2025-02-24 DIAGNOSIS — R10.9 FLANK PAIN: ICD-10-CM

## 2025-02-24 DIAGNOSIS — R39.9 UTI SYMPTOMS: Primary | ICD-10-CM

## 2025-02-24 LAB
ALBUMIN UR-MCNC: 30 MG/DL
APPEARANCE UR: ABNORMAL
BACTERIA #/AREA URNS HPF: ABNORMAL /HPF
BASOPHILS # BLD AUTO: 0 10E3/UL (ref 0–0.2)
BASOPHILS NFR BLD AUTO: 0 %
BILIRUB UR QL STRIP: NEGATIVE
COLOR UR AUTO: YELLOW
EOSINOPHIL # BLD AUTO: 0 10E3/UL (ref 0–0.7)
EOSINOPHIL NFR BLD AUTO: 0 %
ERYTHROCYTE [DISTWIDTH] IN BLOOD BY AUTOMATED COUNT: 13.8 % (ref 10–15)
GLUCOSE UR STRIP-MCNC: NEGATIVE MG/DL
HCT VFR BLD AUTO: 37.9 % (ref 35–47)
HGB BLD-MCNC: 12.4 G/DL (ref 11.7–15.7)
HGB UR QL STRIP: ABNORMAL
IMM GRANULOCYTES # BLD: 0 10E3/UL
IMM GRANULOCYTES NFR BLD: 0 %
KETONES UR STRIP-MCNC: NEGATIVE MG/DL
LEUKOCYTE ESTERASE UR QL STRIP: ABNORMAL
LYMPHOCYTES # BLD AUTO: 1.6 10E3/UL (ref 0.8–5.3)
LYMPHOCYTES NFR BLD AUTO: 16 %
MCH RBC QN AUTO: 27.5 PG (ref 26.5–33)
MCHC RBC AUTO-ENTMCNC: 32.7 G/DL (ref 31.5–36.5)
MCV RBC AUTO: 84 FL (ref 78–100)
MONOCYTES # BLD AUTO: 0.9 10E3/UL (ref 0–1.3)
MONOCYTES NFR BLD AUTO: 9 %
NEUTROPHILS # BLD AUTO: 7.9 10E3/UL (ref 1.6–8.3)
NEUTROPHILS NFR BLD AUTO: 75 %
NITRATE UR QL: NEGATIVE
PH UR STRIP: 6 [PH] (ref 5–8)
PLATELET # BLD AUTO: 241 10E3/UL (ref 150–450)
RBC # BLD AUTO: 4.51 10E6/UL (ref 3.8–5.2)
RBC #/AREA URNS AUTO: ABNORMAL /HPF
SP GR UR STRIP: 1.01 (ref 1–1.03)
SQUAMOUS #/AREA URNS AUTO: ABNORMAL /LPF
UROBILINOGEN UR STRIP-ACNC: 1 E.U./DL
WBC # BLD AUTO: 10.5 10E3/UL (ref 4–11)
WBC #/AREA URNS AUTO: >100 /HPF
WBC CLUMPS #/AREA URNS HPF: PRESENT /HPF

## 2025-02-24 PROCEDURE — 81001 URINALYSIS AUTO W/SCOPE: CPT

## 2025-02-24 PROCEDURE — 87086 URINE CULTURE/COLONY COUNT: CPT

## 2025-02-24 PROCEDURE — 36415 COLL VENOUS BLD VENIPUNCTURE: CPT

## 2025-02-24 PROCEDURE — 87186 SC STD MICRODIL/AGAR DIL: CPT

## 2025-02-24 PROCEDURE — 74176 CT ABD & PELVIS W/O CONTRAST: CPT

## 2025-02-24 PROCEDURE — 85025 COMPLETE CBC W/AUTO DIFF WBC: CPT

## 2025-02-24 RX ORDER — CIPROFLOXACIN 500 MG/1
500 TABLET, FILM COATED ORAL 2 TIMES DAILY
Qty: 14 TABLET | Refills: 0 | Status: SHIPPED | OUTPATIENT
Start: 2025-02-24 | End: 2025-03-03

## 2025-02-24 RX ORDER — NAPROXEN 500 MG/1
500 TABLET ORAL 2 TIMES DAILY WITH MEALS
Status: CANCELLED | OUTPATIENT
Start: 2025-02-24

## 2025-02-24 RX ORDER — NAPROXEN 500 MG/1
500 TABLET ORAL 2 TIMES DAILY WITH MEALS
Qty: 14 TABLET | Refills: 0 | Status: SHIPPED | OUTPATIENT
Start: 2025-02-24 | End: 2025-03-03

## 2025-02-24 RX ORDER — CIPROFLOXACIN 500 MG/1
500 TABLET, FILM COATED ORAL 2 TIMES DAILY
Qty: 14 TABLET | Refills: 0 | Status: CANCELLED | OUTPATIENT
Start: 2025-02-24 | End: 2025-03-03

## 2025-02-24 NOTE — PROGRESS NOTES
Assessment & Plan   Flank pain  UTI symptoms  34-year-old female otherwise healthy presenting with 1 week of UTI symptoms, right-sided flank pain, with chills concerning for UTI with pyelo +/- nephrolithiasis.  Otherwise vitally stable nontoxic-appearing but does have right flank pain on exam.  UA showing moderate leuk esterase; negative nitrates and moderate blood.  Will obtain CT abdomen.  Plan is for outpatient management with 7 days of ciprofloxacin.  Will also prescribe naproxen for pain management.     Update: CBC without leukocytosis or other abnormalities.  CT abdomen showing right-sided hydronephrosis but without obstructing stone, but possible that a recent stone might of passed with some residual edema and hydronephrosis.  Would explain the moderate amount of blood on UA.  This was communicated to the patient and given her overall stable vitals and labs, will treat with outpatient oral abx and pain management.  Anticipatory guidance was provided.  The patient's symptoms do not improve with outpatient management, she should follow-up with her PCP.  If she becomes more severely ill, recommendation would be to go to emergency department for evaluation of pyelo.   - CT Abdomen Pelvis w/o Contrast  - UA Macroscopic with reflex to Microscopic and Culture  - UA Macroscopic with reflex to Microscopic and Culture  - Urine Microscopic Exam  - Urine Culture  - CBC with platelets and differential    Marcos Nguyen MD  [facility] [place]    Chet Ram is a 34 year old female who presents to clinic today for the following health issues:  Chief Complaint   Patient presents with    UTI     Had chills earlier today; lower abdominal pain radiating to back, burning and pain when urinating.      HPI    Presenting with 1 week of UTI symptoms including dysuria, cloudy urine and flank pain.  Describes pain primarily on the right side.  Has not seen anyone for this. Was taking Hmong medication with  some relief but then developed fever and chills last night. Took ibuprofen with some relief. Has only had 1 prior Uti which resolved with oral abx.     No allergies.       Objective    /74 (BP Location: Right arm, Patient Position: Sitting, Cuff Size: Adult Regular)   Pulse 98   Temp 98.9  F (37.2  C) (Oral)   Resp 19   SpO2 99%   Breastfeeding No   Physical Exam   GENERAL: alert and no distress  RESP: lungs clear to auscultation - no rales, rhonchi or wheezes  CV: regular rate and rhythm, normal S1 S2, no S3 or S4, no murmur, click or rub, no peripheral edema  ABDOMEN: soft, nontender, no hepatosplenomegaly, no masses and bowel sounds normal  BACK: R sided CVA tenderness, no paralumbar tenderness    Results for orders placed or performed in visit on 02/24/25 (from the past 24 hours)   UA Macroscopic with reflex to Microscopic and Culture    Specimen: Urine, Clean Catch   Result Value Ref Range    Color Urine Yellow Colorless, Straw, Light Yellow, Yellow    Appearance Urine Cloudy (A) Clear    Glucose Urine Negative Negative mg/dL    Bilirubin Urine Negative Negative    Ketones Urine Negative Negative mg/dL    Specific Gravity Urine 1.015 1.005 - 1.030    Blood Urine Moderate (A) Negative    pH Urine 6.0 5.0 - 8.0    Protein Albumin Urine 30 (A) Negative mg/dL    Urobilinogen Urine 1.0 0.2, 1.0 E.U./dL    Nitrite Urine Negative Negative    Leukocyte Esterase Urine Moderate (A) Negative   Urine Microscopic Exam   Result Value Ref Range    Bacteria Urine Moderate (A) None Seen /HPF    RBC Urine 5-10 (A) 0-2 /HPF /HPF    WBC Urine >100 (A) 0-5 /HPF /HPF    Squamous Epithelials Urine Few (A) None Seen /LPF    WBC Clumps Urine Present (A) None Seen /HPF   CT Abdomen Pelvis w/o Contrast    Narrative    EXAM: CT ABDOMEN PELVIS W/O CONTRAST  LOCATION: [facility]  DATE: 2/24/2025    INDICATION: Flank pain. eval for kidney stones  COMPARISON: 4/12/2020  TECHNIQUE: CT scan of the abdomen and  pelvis was performed without IV contrast. Multiplanar reformats were obtained. Dose reduction techniques were used.  CONTRAST: None.    FINDINGS:   LOWER CHEST: Normal.    HEPATOBILIARY: Normal.    PANCREAS: Normal.    SPLEEN: Normal.    ADRENAL GLANDS: Normal.    KIDNEYS/BLADDER: There is mild right hydronephrosis. Right ureter also mildly dilated down to the level of the crossing iliac vessels though no obstructing lesion or stone identified.  Left kidney normal. Bladder negative.    BOWEL: No obstruction or inflammatory change.    LYMPH NODES: Normal.    VASCULATURE: Normal.    PELVIC ORGANS: Normal.    MUSCULOSKELETAL: Normal.      Impression    IMPRESSION:   1.  Mild right-sided hydronephrosis though no obstructing stone or lesion identified. There is possible a recent stone has passed completely with some residual edema and hydronephrosis.  2.  No current kidney stones evident.     CBC with platelets and differential    Narrative    The following orders were created for panel order CBC with platelets and differential.  Procedure                               Abnormality         Status                     ---------                               -----------         ------                     CBC with platelets and d...[[id number]]                      Final result                 Please view results for these tests on the individual orders.   CBC with platelets and differential   Result Value Ref Range    WBC Count 10.5 4.0 - 11.0 10e3/uL    RBC Count 4.51 3.80 - 5.20 10e6/uL    Hemoglobin 12.4 11.7 - 15.7 g/dL    Hematocrit 37.9 35.0 - 47.0 %    MCV 84 78 - 100 fL    MCH 27.5 26.5 - 33.0 pg    MCHC 32.7 31.5 - 36.5 g/dL    RDW 13.8 10.0 - 15.0 %    Platelet Count 241 150 - 450 10e3/uL    % Neutrophils 75 %    % Lymphocytes 16 %    % Monocytes 9 %    % Eosinophils 0 %    % Basophils 0 %    % Immature Granulocytes 0 %    Absolute Neutrophils 7.9 1.6 - 8.3 10e3/uL    Absolute Lymphocytes 1.6 0.8 - 5.3 10e3/uL     Absolute Monocytes 0.9 0.0 - 1.3 10e3/uL    Absolute Eosinophils 0.0 0.0 - 0.7 10e3/uL    Absolute Basophils 0.0 0.0 - 0.2 10e3/uL    Absolute Immature Granulocytes 0.0 <=0.4 10e3/uL     Results for orders placed or performed in visit on 02/24/25   CT Abdomen Pelvis w/o Contrast    Impression    IMPRESSION:   1.  Mild right-sided hydronephrosis though no obstructing stone or lesion identified. There is possible a recent stone has passed completely with some residual edema and hydronephrosis.  2.  No current kidney stones evident.

## 2025-02-25 LAB — BACTERIA UR CULT: ABNORMAL

## 2025-02-25 NOTE — PATIENT INSTRUCTIONS
We are treating you for a UTI. Take your medications for the next 7 days.     The CT scan showed some Mild R hydronephrosis however no renal stone. If you experience any persistent fevers, chills, nausea vomiting, I recommend you go to the ED for evaluation or schedule an appointment with your PCP. You may need a repeat CT abd or renal ultrasound.

## 2025-03-08 ENCOUNTER — HEALTH MAINTENANCE LETTER (OUTPATIENT)
Age: 35
End: 2025-03-08